# Patient Record
Sex: MALE | Race: OTHER | Employment: OTHER
[De-identification: names, ages, dates, MRNs, and addresses within clinical notes are randomized per-mention and may not be internally consistent; named-entity substitution may affect disease eponyms.]

---

## 2017-01-24 ENCOUNTER — OFFICE VISIT (OUTPATIENT)
Dept: FAMILY MEDICINE CLINIC | Facility: CLINIC | Age: 57
End: 2017-01-24

## 2017-01-24 VITALS
HEIGHT: 65 IN | DIASTOLIC BLOOD PRESSURE: 74 MMHG | BODY MASS INDEX: 23.19 KG/M2 | WEIGHT: 139.2 LBS | HEART RATE: 61 BPM | SYSTOLIC BLOOD PRESSURE: 139 MMHG | TEMPERATURE: 97.7 F

## 2017-01-24 DIAGNOSIS — L08.9 NONVENOMOUS INSECT BITE OF FACE WITH INFECTION, INITIAL ENCOUNTER: Primary | ICD-10-CM

## 2017-01-24 DIAGNOSIS — I10 ESSENTIAL HYPERTENSION: ICD-10-CM

## 2017-01-24 DIAGNOSIS — I25.10 CORONARY ARTERY DISEASE INVOLVING NATIVE CORONARY ARTERY OF NATIVE HEART, ANGINA PRESENCE UNSPECIFIED: ICD-10-CM
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
## 2017-01-24 DIAGNOSIS — S00.86XA NONVENOMOUS INSECT BITE OF FACE WITH INFECTION, INITIAL ENCOUNTER: Primary | ICD-10-CM

## 2017-01-24 DIAGNOSIS — L03.211 CELLULITIS, FACE: ICD-10-CM

## 2017-01-24 PROCEDURE — G8427 DOCREV CUR MEDS BY ELIG CLIN: HCPCS | Performed by: FAMILY MEDICINE

## 2017-01-24 PROCEDURE — 99213 OFFICE O/P EST LOW 20 MIN: CPT | Performed by: FAMILY MEDICINE

## 2017-01-24 PROCEDURE — 1036F TOBACCO NON-USER: CPT | Performed by: FAMILY MEDICINE

## 2017-01-24 PROCEDURE — G8420 CALC BMI NORM PARAMETERS: HCPCS | Performed by: FAMILY MEDICINE

## 2017-01-24 PROCEDURE — G8484 FLU IMMUNIZE NO ADMIN: HCPCS | Performed by: FAMILY MEDICINE

## 2017-01-24 PROCEDURE — G8598 ASA/ANTIPLAT THER USED: HCPCS | Performed by: FAMILY MEDICINE

## 2017-01-24 PROCEDURE — 3017F COLORECTAL CA SCREEN DOC REV: CPT | Performed by: FAMILY MEDICINE

## 2017-01-24 RX ORDER — CEPHALEXIN 500 MG/1
500 CAPSULE ORAL 3 TIMES DAILY
Qty: 30 CAPSULE | Refills: 1 | Status: SHIPPED | OUTPATIENT
Start: 2017-01-24 | End: 2017-02-03

## 2017-01-24 ASSESSMENT — PATIENT HEALTH QUESTIONNAIRE - PHQ9
SUM OF ALL RESPONSES TO PHQ QUESTIONS 1-9: 2
1. LITTLE INTEREST OR PLEASURE IN DOING THINGS: 1
2. FEELING DOWN, DEPRESSED OR HOPELESS: 1
SUM OF ALL RESPONSES TO PHQ9 QUESTIONS 1 & 2: 2

## 2017-01-24 ASSESSMENT — ENCOUNTER SYMPTOMS
SHORTNESS OF BREATH: 0
COUGH: 0
TROUBLE SWALLOWING: 0
CHEST TIGHTNESS: 0
CONSTIPATION: 0
RHINORRHEA: 0
ABDOMINAL PAIN: 0
SORE THROAT: 0
DIARRHEA: 0
BACK PAIN: 0
NAUSEA: 0

## 2017-01-30 DIAGNOSIS — F41.8 DEPRESSION WITH ANXIETY: ICD-10-CM

## 2017-01-30 DIAGNOSIS — F41.9 ANXIETY: ICD-10-CM

## 2017-01-31 RX ORDER — ALPRAZOLAM 0.25 MG/1
0.25 TABLET ORAL 2 TIMES DAILY PRN
Qty: 60 TABLET | Refills: 0 | Status: SHIPPED | OUTPATIENT
Start: 2017-01-31 | End: 2017-03-23 | Stop reason: SDUPTHER

## 2017-02-06 ENCOUNTER — TELEPHONE (OUTPATIENT)
Dept: FAMILY MEDICINE CLINIC | Facility: CLINIC | Age: 57
End: 2017-02-06

## 2017-02-09 RX ORDER — CLOPIDOGREL BISULFATE 75 MG/1
TABLET ORAL
Qty: 30 TABLET | Refills: 3 | Status: SHIPPED | OUTPATIENT
Start: 2017-02-09 | End: 2017-06-13 | Stop reason: SDUPTHER

## 2017-02-14 ENCOUNTER — TELEPHONE (OUTPATIENT)
Dept: FAMILY MEDICINE CLINIC | Facility: CLINIC | Age: 57
End: 2017-02-14

## 2017-02-23 ENCOUNTER — HOSPITAL ENCOUNTER (INPATIENT)
Age: 57
LOS: 1 days | Discharge: HOME | DRG: 313 | End: 2017-02-24
Attending: EMERGENCY MEDICINE | Admitting: INTERNAL MEDICINE
Payer: MEDICARE

## 2017-02-23 ENCOUNTER — APPOINTMENT (OUTPATIENT)
Dept: GENERAL RADIOLOGY | Age: 57
DRG: 313 | End: 2017-02-23
Payer: MEDICARE

## 2017-02-23 DIAGNOSIS — R07.9 CHEST PAIN, UNSPECIFIED TYPE: Primary | ICD-10-CM

## 2017-02-23 LAB
ABSOLUTE EOS #: 0.1 K/UL (ref 0–0.4)
ABSOLUTE LYMPH #: 5 K/UL (ref 1–4.8)
ABSOLUTE MONO #: 1 K/UL (ref 0.1–1.3)
ANION GAP SERPL CALCULATED.3IONS-SCNC: 15 MMOL/L (ref 9–17)
BASOPHILS # BLD: 0 % (ref 0–2)
BASOPHILS ABSOLUTE: 0 K/UL (ref 0–0.2)
BNP INTERPRETATION: NORMAL
BUN BLDV-MCNC: 19 MG/DL (ref 6–20)
BUN/CREAT BLD: ABNORMAL (ref 9–20)
CALCIUM SERPL-MCNC: 8.8 MG/DL (ref 8.6–10.4)
CHLORIDE BLD-SCNC: 101 MMOL/L (ref 98–107)
CO2: 24 MMOL/L (ref 20–31)
CREAT SERPL-MCNC: 1.01 MG/DL (ref 0.7–1.2)
DIFFERENTIAL TYPE: ABNORMAL
EOSINOPHILS RELATIVE PERCENT: 1 % (ref 0–4)
GFR AFRICAN AMERICAN: >60 ML/MIN
GFR NON-AFRICAN AMERICAN: >60 ML/MIN
GFR SERPL CREATININE-BSD FRML MDRD: ABNORMAL ML/MIN/{1.73_M2}
GFR SERPL CREATININE-BSD FRML MDRD: ABNORMAL ML/MIN/{1.73_M2}
GLUCOSE BLD-MCNC: 136 MG/DL (ref 70–99)
HCT VFR BLD CALC: 39.7 % (ref 41–53)
HEMOGLOBIN: 13.6 G/DL (ref 13.5–17.5)
INR BLD: 1
LYMPHOCYTES # BLD: 51 % (ref 24–44)
MCH RBC QN AUTO: 30.8 PG (ref 26–34)
MCHC RBC AUTO-ENTMCNC: 34.1 G/DL (ref 31–37)
MCV RBC AUTO: 90.1 FL (ref 80–100)
MONOCYTES # BLD: 11 % (ref 1–7)
PARTIAL THROMBOPLASTIN TIME: 27.3 SEC (ref 23–31)
PDW BLD-RTO: 12.8 % (ref 11.5–14.9)
PLATELET # BLD: 200 K/UL (ref 150–450)
PLATELET ESTIMATE: ABNORMAL
PMV BLD AUTO: 8.8 FL (ref 6–12)
POTASSIUM SERPL-SCNC: 3.9 MMOL/L (ref 3.7–5.3)
PRO-BNP: 38 PG/ML
PROTHROMBIN TIME: 10.6 SEC (ref 9.7–12)
RBC # BLD: 4.41 M/UL (ref 4.5–5.9)
RBC # BLD: ABNORMAL 10*6/UL
SEG NEUTROPHILS: 37 % (ref 36–66)
SEGMENTED NEUTROPHILS ABSOLUTE COUNT: 3.6 K/UL (ref 1.3–9.1)
SODIUM BLD-SCNC: 140 MMOL/L (ref 135–144)
TROPONIN INTERP: NORMAL
TROPONIN INTERP: NORMAL
TROPONIN T: <0.03 NG/ML
TROPONIN T: <0.03 NG/ML
WBC # BLD: 9.7 K/UL (ref 3.5–11)
WBC # BLD: ABNORMAL 10*3/UL

## 2017-02-23 PROCEDURE — 83880 ASSAY OF NATRIURETIC PEPTIDE: CPT

## 2017-02-23 PROCEDURE — 96374 THER/PROPH/DIAG INJ IV PUSH: CPT

## 2017-02-23 PROCEDURE — 85025 COMPLETE CBC W/AUTO DIFF WBC: CPT

## 2017-02-23 PROCEDURE — 99285 EMERGENCY DEPT VISIT HI MDM: CPT

## 2017-02-23 PROCEDURE — 71010 XR CHEST PORTABLE: CPT

## 2017-02-23 PROCEDURE — 96376 TX/PRO/DX INJ SAME DRUG ADON: CPT

## 2017-02-23 PROCEDURE — 96375 TX/PRO/DX INJ NEW DRUG ADDON: CPT

## 2017-02-23 PROCEDURE — 80048 BASIC METABOLIC PNL TOTAL CA: CPT

## 2017-02-23 PROCEDURE — 84443 ASSAY THYROID STIM HORMONE: CPT

## 2017-02-23 PROCEDURE — 6360000002 HC RX W HCPCS: Performed by: EMERGENCY MEDICINE

## 2017-02-23 PROCEDURE — 84484 ASSAY OF TROPONIN QUANT: CPT

## 2017-02-23 PROCEDURE — 36415 COLL VENOUS BLD VENIPUNCTURE: CPT

## 2017-02-23 PROCEDURE — 71010 XR CHEST PORTABLE: CPT | Performed by: RADIOLOGY

## 2017-02-23 PROCEDURE — 85730 THROMBOPLASTIN TIME PARTIAL: CPT

## 2017-02-23 PROCEDURE — 93005 ELECTROCARDIOGRAM TRACING: CPT

## 2017-02-23 PROCEDURE — 85610 PROTHROMBIN TIME: CPT

## 2017-02-23 RX ORDER — ONDANSETRON 2 MG/ML
4 INJECTION INTRAMUSCULAR; INTRAVENOUS ONCE
Status: COMPLETED | OUTPATIENT
Start: 2017-02-23 | End: 2017-02-23

## 2017-02-23 RX ORDER — MORPHINE SULFATE 4 MG/ML
4 INJECTION, SOLUTION INTRAMUSCULAR; INTRAVENOUS ONCE
Status: COMPLETED | OUTPATIENT
Start: 2017-02-23 | End: 2017-02-23

## 2017-02-23 RX ADMIN — MORPHINE SULFATE 4 MG: 4 INJECTION, SOLUTION INTRAMUSCULAR; INTRAVENOUS at 20:20

## 2017-02-23 RX ADMIN — ONDANSETRON 4 MG: 2 INJECTION INTRAMUSCULAR; INTRAVENOUS at 20:20

## 2017-02-23 ASSESSMENT — PAIN SCALES - GENERAL
PAINLEVEL_OUTOF10: 8
PAINLEVEL_OUTOF10: 8

## 2017-02-23 ASSESSMENT — PAIN DESCRIPTION - ORIENTATION: ORIENTATION: LEFT

## 2017-02-23 ASSESSMENT — PAIN DESCRIPTION - DESCRIPTORS: DESCRIPTORS: STABBING

## 2017-02-23 ASSESSMENT — PAIN DESCRIPTION - ONSET: ONSET: SUDDEN

## 2017-02-23 ASSESSMENT — PAIN DESCRIPTION - PAIN TYPE: TYPE: ACUTE PAIN

## 2017-02-23 ASSESSMENT — PAIN DESCRIPTION - FREQUENCY: FREQUENCY: CONTINUOUS

## 2017-02-23 ASSESSMENT — PAIN DESCRIPTION - PROGRESSION: CLINICAL_PROGRESSION: NOT CHANGED

## 2017-02-23 ASSESSMENT — PAIN DESCRIPTION - LOCATION: LOCATION: CHEST

## 2017-02-24 ENCOUNTER — HOSPITAL ENCOUNTER (OUTPATIENT)
Dept: CARDIAC CATH/INVASIVE PROCEDURES | Age: 57
Discharge: HOME | End: 2017-02-24
Payer: MEDICARE

## 2017-02-24 VITALS
OXYGEN SATURATION: 100 % | HEIGHT: 65 IN | HEART RATE: 56 BPM | BODY MASS INDEX: 24.13 KG/M2 | SYSTOLIC BLOOD PRESSURE: 126 MMHG | TEMPERATURE: 97.9 F | DIASTOLIC BLOOD PRESSURE: 60 MMHG | RESPIRATION RATE: 17 BRPM | WEIGHT: 144.84 LBS

## 2017-02-24 VITALS
SYSTOLIC BLOOD PRESSURE: 135 MMHG | OXYGEN SATURATION: 100 % | HEART RATE: 56 BPM | DIASTOLIC BLOOD PRESSURE: 67 MMHG | RESPIRATION RATE: 12 BRPM

## 2017-02-24 LAB
ABSOLUTE EOS #: 0.1 K/UL (ref 0–0.4)
ABSOLUTE LYMPH #: 3.2 K/UL (ref 1–4.8)
ABSOLUTE MONO #: 0.9 K/UL (ref 0.1–1.3)
ALBUMIN SERPL-MCNC: 3.9 G/DL (ref 3.5–5.2)
ALBUMIN/GLOBULIN RATIO: ABNORMAL (ref 1–2.5)
ALP BLD-CCNC: 41 U/L (ref 40–129)
ALT SERPL-CCNC: 15 U/L (ref 5–41)
ANION GAP SERPL CALCULATED.3IONS-SCNC: 8 MMOL/L (ref 9–17)
AST SERPL-CCNC: 15 U/L
BASOPHILS # BLD: 1 % (ref 0–2)
BASOPHILS ABSOLUTE: 0 K/UL (ref 0–0.2)
BILIRUB SERPL-MCNC: 0.95 MG/DL (ref 0.3–1.2)
BNP INTERPRETATION: NORMAL
BUN BLDV-MCNC: 13 MG/DL (ref 6–20)
BUN/CREAT BLD: ABNORMAL (ref 9–20)
CALCIUM SERPL-MCNC: 8.6 MG/DL (ref 8.6–10.4)
CHLORIDE BLD-SCNC: 105 MMOL/L (ref 98–107)
CHOLESTEROL/HDL RATIO: 2.8
CHOLESTEROL: 105 MG/DL
CO2: 27 MMOL/L (ref 20–31)
CREAT SERPL-MCNC: 0.76 MG/DL (ref 0.7–1.2)
DIFFERENTIAL TYPE: ABNORMAL
EKG ATRIAL RATE: 58 BPM
EKG P AXIS: 49 DEGREES
EKG P-R INTERVAL: 134 MS
EKG Q-T INTERVAL: 424 MS
EKG QRS DURATION: 102 MS
EKG QTC CALCULATION (BAZETT): 416 MS
EKG R AXIS: 35 DEGREES
EKG T AXIS: 62 DEGREES
EKG VENTRICULAR RATE: 58 BPM
EOSINOPHILS RELATIVE PERCENT: 1 % (ref 0–4)
GFR AFRICAN AMERICAN: >60 ML/MIN
GFR NON-AFRICAN AMERICAN: >60 ML/MIN
GFR SERPL CREATININE-BSD FRML MDRD: ABNORMAL ML/MIN/{1.73_M2}
GFR SERPL CREATININE-BSD FRML MDRD: ABNORMAL ML/MIN/{1.73_M2}
GLUCOSE BLD-MCNC: 103 MG/DL (ref 75–110)
GLUCOSE BLD-MCNC: 121 MG/DL (ref 70–99)
HCT VFR BLD CALC: 36.8 % (ref 41–53)
HCT VFR BLD CALC: 38.3 % (ref 41–53)
HDLC SERPL-MCNC: 37 MG/DL
HEMOGLOBIN: 12.7 G/DL (ref 13.5–17.5)
HEMOGLOBIN: 13.1 G/DL (ref 13.5–17.5)
LDL CHOLESTEROL: 54 MG/DL (ref 0–130)
LV EF: 55 %
LVEF MODALITY: NORMAL
LYMPHOCYTES # BLD: 39 % (ref 24–44)
MAGNESIUM: 2.3 MG/DL (ref 1.6–2.6)
MCH RBC QN AUTO: 30.6 PG (ref 26–34)
MCH RBC QN AUTO: 30.6 PG (ref 26–34)
MCHC RBC AUTO-ENTMCNC: 34.1 G/DL (ref 31–37)
MCHC RBC AUTO-ENTMCNC: 34.4 G/DL (ref 31–37)
MCV RBC AUTO: 89 FL (ref 80–100)
MCV RBC AUTO: 89.5 FL (ref 80–100)
MONOCYTES # BLD: 10 % (ref 1–7)
PARTIAL THROMBOPLASTIN TIME: 27.5 SEC (ref 23–31)
PDW BLD-RTO: 12.5 % (ref 11.5–14.9)
PDW BLD-RTO: 12.9 % (ref 11.5–14.9)
PLATELET # BLD: 167 K/UL (ref 150–450)
PLATELET # BLD: 178 K/UL (ref 150–450)
PLATELET ESTIMATE: ABNORMAL
PMV BLD AUTO: 8.4 FL (ref 6–12)
PMV BLD AUTO: 8.6 FL (ref 6–12)
POTASSIUM SERPL-SCNC: 3.8 MMOL/L (ref 3.7–5.3)
PRO-BNP: 95 PG/ML
RBC # BLD: 4.13 M/UL (ref 4.5–5.9)
RBC # BLD: 4.28 M/UL (ref 4.5–5.9)
RBC # BLD: ABNORMAL 10*6/UL
SEG NEUTROPHILS: 49 % (ref 36–66)
SEGMENTED NEUTROPHILS ABSOLUTE COUNT: 4.1 K/UL (ref 1.3–9.1)
SODIUM BLD-SCNC: 140 MMOL/L (ref 135–144)
TOTAL PROTEIN: 6 G/DL (ref 6.4–8.3)
TRIGL SERPL-MCNC: 70 MG/DL
TSH SERPL DL<=0.05 MIU/L-ACNC: 0.63 MIU/L (ref 0.3–5)
VLDLC SERPL CALC-MCNC: ABNORMAL MG/DL (ref 1–30)
WBC # BLD: 10.6 K/UL (ref 3.5–11)
WBC # BLD: 8.3 K/UL (ref 3.5–11)
WBC # BLD: ABNORMAL 10*3/UL

## 2017-02-24 PROCEDURE — C1769 GUIDE WIRE: HCPCS

## 2017-02-24 PROCEDURE — 93459 L HRT ART/GRFT ANGIO: CPT | Performed by: INTERNAL MEDICINE

## 2017-02-24 PROCEDURE — 82947 ASSAY GLUCOSE BLOOD QUANT: CPT

## 2017-02-24 PROCEDURE — 7100000010 HC PHASE II RECOVERY - FIRST 15 MIN

## 2017-02-24 PROCEDURE — 85027 COMPLETE CBC AUTOMATED: CPT

## 2017-02-24 PROCEDURE — 85025 COMPLETE CBC W/AUTO DIFF WBC: CPT

## 2017-02-24 PROCEDURE — C8929 TTE W OR WO FOL WCON,DOPPLER: HCPCS

## 2017-02-24 PROCEDURE — 80053 COMPREHEN METABOLIC PANEL: CPT

## 2017-02-24 PROCEDURE — 6370000000 HC RX 637 (ALT 250 FOR IP): Performed by: NURSE PRACTITIONER

## 2017-02-24 PROCEDURE — 80061 LIPID PANEL: CPT

## 2017-02-24 PROCEDURE — 6360000002 HC RX W HCPCS

## 2017-02-24 PROCEDURE — 2500000003 HC RX 250 WO HCPCS: Performed by: INTERNAL MEDICINE

## 2017-02-24 PROCEDURE — 2060000000 HC ICU INTERMEDIATE R&B

## 2017-02-24 PROCEDURE — 85730 THROMBOPLASTIN TIME PARTIAL: CPT

## 2017-02-24 PROCEDURE — 36415 COLL VENOUS BLD VENIPUNCTURE: CPT

## 2017-02-24 PROCEDURE — 2580000003 HC RX 258: Performed by: NURSE PRACTITIONER

## 2017-02-24 PROCEDURE — 6360000002 HC RX W HCPCS: Performed by: EMERGENCY MEDICINE

## 2017-02-24 PROCEDURE — 83880 ASSAY OF NATRIURETIC PEPTIDE: CPT

## 2017-02-24 PROCEDURE — 83735 ASSAY OF MAGNESIUM: CPT

## 2017-02-24 PROCEDURE — 6370000000 HC RX 637 (ALT 250 FOR IP)

## 2017-02-24 PROCEDURE — 6360000002 HC RX W HCPCS: Performed by: NURSE PRACTITIONER

## 2017-02-24 PROCEDURE — 7100000011 HC PHASE II RECOVERY - ADDTL 15 MIN

## 2017-02-24 PROCEDURE — 6360000002 HC RX W HCPCS: Performed by: INTERNAL MEDICINE

## 2017-02-24 PROCEDURE — 99223 1ST HOSP IP/OBS HIGH 75: CPT | Performed by: INTERNAL MEDICINE

## 2017-02-24 PROCEDURE — 99238 HOSP IP/OBS DSCHRG MGMT 30/<: CPT | Performed by: INTERNAL MEDICINE

## 2017-02-24 PROCEDURE — C1768 GRAFT, VASCULAR: HCPCS

## 2017-02-24 PROCEDURE — 93005 ELECTROCARDIOGRAM TRACING: CPT

## 2017-02-24 PROCEDURE — C1725 CATH, TRANSLUMIN NON-LASER: HCPCS

## 2017-02-24 RX ORDER — POTASSIUM CHLORIDE 20MEQ/15ML
40 LIQUID (ML) ORAL PRN
Status: DISCONTINUED | OUTPATIENT
Start: 2017-02-24 | End: 2017-02-24 | Stop reason: HOSPADM

## 2017-02-24 RX ORDER — ALPRAZOLAM 0.25 MG/1
0.25 TABLET ORAL 2 TIMES DAILY PRN
Status: DISCONTINUED | OUTPATIENT
Start: 2017-02-24 | End: 2017-02-24 | Stop reason: HOSPADM

## 2017-02-24 RX ORDER — ONDANSETRON 2 MG/ML
4 INJECTION INTRAMUSCULAR; INTRAVENOUS EVERY 6 HOURS PRN
Status: DISCONTINUED | OUTPATIENT
Start: 2017-02-24 | End: 2017-02-24 | Stop reason: HOSPADM

## 2017-02-24 RX ORDER — SODIUM CHLORIDE 0.9 % (FLUSH) 0.9 %
10 SYRINGE (ML) INJECTION PRN
Status: DISCONTINUED | OUTPATIENT
Start: 2017-02-24 | End: 2017-02-25 | Stop reason: HOSPADM

## 2017-02-24 RX ORDER — FAMOTIDINE 20 MG/1
20 TABLET, FILM COATED ORAL 2 TIMES DAILY
Status: ON HOLD | COMMUNITY
End: 2017-07-22

## 2017-02-24 RX ORDER — ACETAMINOPHEN 325 MG/1
650 TABLET ORAL EVERY 4 HOURS PRN
Status: DISCONTINUED | OUTPATIENT
Start: 2017-02-24 | End: 2017-02-25 | Stop reason: HOSPADM

## 2017-02-24 RX ORDER — SODIUM CHLORIDE 0.9 % (FLUSH) 0.9 %
10 SYRINGE (ML) INJECTION EVERY 12 HOURS SCHEDULED
Status: DISCONTINUED | OUTPATIENT
Start: 2017-02-24 | End: 2017-02-25 | Stop reason: HOSPADM

## 2017-02-24 RX ORDER — NITROGLYCERIN 20 MG/100ML
5 INJECTION INTRAVENOUS CONTINUOUS
Status: DISCONTINUED | OUTPATIENT
Start: 2017-02-24 | End: 2017-02-24 | Stop reason: HOSPADM

## 2017-02-24 RX ORDER — FAMOTIDINE 20 MG/1
20 TABLET, FILM COATED ORAL 2 TIMES DAILY
Status: DISCONTINUED | OUTPATIENT
Start: 2017-02-24 | End: 2017-02-24 | Stop reason: HOSPADM

## 2017-02-24 RX ORDER — ASPIRIN 325 MG
325 TABLET ORAL DAILY
Status: DISCONTINUED | OUTPATIENT
Start: 2017-02-24 | End: 2017-02-24 | Stop reason: HOSPADM

## 2017-02-24 RX ORDER — ACETAMINOPHEN 325 MG/1
650 TABLET ORAL EVERY 4 HOURS PRN
Status: DISCONTINUED | OUTPATIENT
Start: 2017-02-24 | End: 2017-02-24 | Stop reason: HOSPADM

## 2017-02-24 RX ORDER — SODIUM CHLORIDE 0.9 % (FLUSH) 0.9 %
10 SYRINGE (ML) INJECTION EVERY 12 HOURS SCHEDULED
Status: DISCONTINUED | OUTPATIENT
Start: 2017-02-24 | End: 2017-02-24 | Stop reason: HOSPADM

## 2017-02-24 RX ORDER — POTASSIUM CHLORIDE 20 MEQ/1
40 TABLET, EXTENDED RELEASE ORAL PRN
Status: DISCONTINUED | OUTPATIENT
Start: 2017-02-24 | End: 2017-02-24 | Stop reason: HOSPADM

## 2017-02-24 RX ORDER — SODIUM CHLORIDE 0.9 % (FLUSH) 0.9 %
10 SYRINGE (ML) INJECTION PRN
Status: DISCONTINUED | OUTPATIENT
Start: 2017-02-24 | End: 2017-02-24 | Stop reason: HOSPADM

## 2017-02-24 RX ORDER — LISINOPRIL 10 MG/1
10 TABLET ORAL DAILY
Status: DISCONTINUED | OUTPATIENT
Start: 2017-02-24 | End: 2017-02-24 | Stop reason: HOSPADM

## 2017-02-24 RX ORDER — OXYCODONE HYDROCHLORIDE AND ACETAMINOPHEN 5; 325 MG/1; MG/1
1 TABLET ORAL ONCE
Status: COMPLETED | OUTPATIENT
Start: 2017-02-24 | End: 2017-02-24

## 2017-02-24 RX ORDER — CLOPIDOGREL BISULFATE 75 MG/1
75 TABLET ORAL DAILY
Status: DISCONTINUED | OUTPATIENT
Start: 2017-02-24 | End: 2017-02-24 | Stop reason: HOSPADM

## 2017-02-24 RX ORDER — OXYCODONE HYDROCHLORIDE 5 MG/1
5 TABLET ORAL EVERY 8 HOURS PRN
Status: DISCONTINUED | OUTPATIENT
Start: 2017-02-24 | End: 2017-02-24 | Stop reason: HOSPADM

## 2017-02-24 RX ORDER — DEXTROSE, SODIUM CHLORIDE, AND POTASSIUM CHLORIDE 5; .45; .15 G/100ML; G/100ML; G/100ML
INJECTION INTRAVENOUS CONTINUOUS
Status: DISCONTINUED | OUTPATIENT
Start: 2017-02-24 | End: 2017-02-24 | Stop reason: HOSPADM

## 2017-02-24 RX ORDER — OXYCODONE HYDROCHLORIDE AND ACETAMINOPHEN 5; 325 MG/1; MG/1
1 TABLET ORAL EVERY 8 HOURS PRN
Status: DISCONTINUED | OUTPATIENT
Start: 2017-02-24 | End: 2017-02-24 | Stop reason: HOSPADM

## 2017-02-24 RX ORDER — MORPHINE SULFATE 2 MG/ML
2 INJECTION, SOLUTION INTRAMUSCULAR; INTRAVENOUS
Status: DISCONTINUED | OUTPATIENT
Start: 2017-02-24 | End: 2017-02-24 | Stop reason: HOSPADM

## 2017-02-24 RX ORDER — ATORVASTATIN CALCIUM 40 MG/1
40 TABLET, FILM COATED ORAL DAILY
Status: DISCONTINUED | OUTPATIENT
Start: 2017-02-24 | End: 2017-02-24 | Stop reason: HOSPADM

## 2017-02-24 RX ORDER — POTASSIUM CHLORIDE 7.45 MG/ML
10 INJECTION INTRAVENOUS PRN
Status: DISCONTINUED | OUTPATIENT
Start: 2017-02-24 | End: 2017-02-24 | Stop reason: HOSPADM

## 2017-02-24 RX ORDER — OXYCODONE AND ACETAMINOPHEN 10; 325 MG/1; MG/1
1 TABLET ORAL EVERY 8 HOURS PRN
Status: DISCONTINUED | OUTPATIENT
Start: 2017-02-24 | End: 2017-02-24 | Stop reason: CLARIF

## 2017-02-24 RX ORDER — NITROGLYCERIN 0.4 MG/1
0.4 TABLET SUBLINGUAL EVERY 5 MIN PRN
Status: DISCONTINUED | OUTPATIENT
Start: 2017-02-24 | End: 2017-02-24 | Stop reason: HOSPADM

## 2017-02-24 RX ORDER — MORPHINE SULFATE 4 MG/ML
4 INJECTION, SOLUTION INTRAMUSCULAR; INTRAVENOUS ONCE
Status: COMPLETED | OUTPATIENT
Start: 2017-02-24 | End: 2017-02-24

## 2017-02-24 RX ORDER — HEPARIN SODIUM 10000 [USP'U]/100ML
1000 INJECTION, SOLUTION INTRAVENOUS CONTINUOUS
Status: DISCONTINUED | OUTPATIENT
Start: 2017-02-24 | End: 2017-02-24 | Stop reason: HOSPADM

## 2017-02-24 RX ORDER — OXYCODONE HYDROCHLORIDE AND ACETAMINOPHEN 5; 325 MG/1; MG/1
1 TABLET ORAL ONCE
Status: DISCONTINUED | OUTPATIENT
Start: 2017-02-24 | End: 2017-02-24

## 2017-02-24 RX ORDER — MORPHINE SULFATE 4 MG/ML
4 INJECTION, SOLUTION INTRAMUSCULAR; INTRAVENOUS
Status: DISCONTINUED | OUTPATIENT
Start: 2017-02-24 | End: 2017-02-24 | Stop reason: HOSPADM

## 2017-02-24 RX ORDER — ASPIRIN 325 MG
325 TABLET ORAL DAILY
Status: ON HOLD | COMMUNITY
End: 2017-02-24 | Stop reason: HOSPADM

## 2017-02-24 RX ADMIN — MORPHINE SULFATE 2 MG: 2 INJECTION, SOLUTION INTRAMUSCULAR; INTRAVENOUS at 11:25

## 2017-02-24 RX ADMIN — NITROGLYCERIN 0.4 MG: 0.4 TABLET SUBLINGUAL at 11:01

## 2017-02-24 RX ADMIN — LISINOPRIL 10 MG: 10 TABLET ORAL at 11:01

## 2017-02-24 RX ADMIN — HEPARIN SODIUM AND DEXTROSE 1000 UNITS/HR: 10000; 5 INJECTION INTRAVENOUS at 11:50

## 2017-02-24 RX ADMIN — OXYCODONE HYDROCHLORIDE 5 MG: 5 TABLET ORAL at 07:36

## 2017-02-24 RX ADMIN — NITROGLYCERIN 5 MCG/MIN: 20 INJECTION INTRAVENOUS at 11:51

## 2017-02-24 RX ADMIN — ATORVASTATIN CALCIUM 40 MG: 40 TABLET, FILM COATED ORAL at 11:01

## 2017-02-24 RX ADMIN — MORPHINE SULFATE 4 MG: 4 INJECTION, SOLUTION INTRAMUSCULAR; INTRAVENOUS at 00:08

## 2017-02-24 RX ADMIN — Medication 10 ML: at 02:35

## 2017-02-24 RX ADMIN — ALPRAZOLAM 0.25 MG: 0.25 TABLET ORAL at 10:49

## 2017-02-24 RX ADMIN — ASPIRIN 325 MG ORAL TABLET 325 MG: 325 PILL ORAL at 11:01

## 2017-02-24 RX ADMIN — MORPHINE SULFATE 4 MG: 4 INJECTION, SOLUTION INTRAMUSCULAR; INTRAVENOUS at 05:00

## 2017-02-24 RX ADMIN — OXYCODONE HYDROCHLORIDE AND ACETAMINOPHEN 1 TABLET: 5; 325 TABLET ORAL at 16:00

## 2017-02-24 RX ADMIN — CLOPIDOGREL BISULFATE 75 MG: 75 TABLET ORAL at 11:01

## 2017-02-24 RX ADMIN — OXYCODONE HYDROCHLORIDE AND ACETAMINOPHEN 1 TABLET: 5; 325 TABLET ORAL at 07:36

## 2017-02-24 RX ADMIN — FAMOTIDINE 20 MG: 20 TABLET ORAL at 02:54

## 2017-02-24 RX ADMIN — Medication 10 ML: at 08:45

## 2017-02-24 RX ADMIN — POTASSIUM CHLORIDE, DEXTROSE MONOHYDRATE AND SODIUM CHLORIDE: 150; 5; 450 INJECTION, SOLUTION INTRAVENOUS at 02:54

## 2017-02-24 RX ADMIN — ONDANSETRON 4 MG: 2 INJECTION INTRAMUSCULAR; INTRAVENOUS at 02:34

## 2017-02-24 RX ADMIN — ONDANSETRON 4 MG: 2 INJECTION INTRAMUSCULAR; INTRAVENOUS at 08:45

## 2017-02-24 RX ADMIN — MORPHINE SULFATE 4 MG: 4 INJECTION, SOLUTION INTRAMUSCULAR; INTRAVENOUS at 02:54

## 2017-02-24 ASSESSMENT — ENCOUNTER SYMPTOMS
BLURRED VISION: 0
NAUSEA: 1
SORE THROAT: 0
WHEEZING: 0
ABDOMINAL PAIN: 0
SPUTUM PRODUCTION: 0
DIARRHEA: 0
SHORTNESS OF BREATH: 0
BLOOD IN STOOL: 0
COLOR CHANGE: 0
COUGH: 0
ORTHOPNEA: 0
PHOTOPHOBIA: 0
NAUSEA: 0
DOUBLE VISION: 0
BACK PAIN: 0
VOMITING: 1
ABDOMINAL DISTENTION: 0
CHEST TIGHTNESS: 0
CONSTIPATION: 0
STRIDOR: 0

## 2017-02-24 ASSESSMENT — PAIN DESCRIPTION - PAIN TYPE
TYPE: ACUTE PAIN

## 2017-02-24 ASSESSMENT — PAIN SCALES - GENERAL
PAINLEVEL_OUTOF10: 9
PAINLEVEL_OUTOF10: 7
PAINLEVEL_OUTOF10: 6
PAINLEVEL_OUTOF10: 8
PAINLEVEL_OUTOF10: 6
PAINLEVEL_OUTOF10: 8
PAINLEVEL_OUTOF10: 6

## 2017-02-24 ASSESSMENT — PAIN DESCRIPTION - DESCRIPTORS
DESCRIPTORS: SHARP;STABBING
DESCRIPTORS: SHARP;STABBING

## 2017-02-24 ASSESSMENT — PAIN DESCRIPTION - FREQUENCY
FREQUENCY: CONTINUOUS
FREQUENCY: CONTINUOUS

## 2017-02-24 ASSESSMENT — PAIN DESCRIPTION - LOCATION
LOCATION: CHEST

## 2017-02-24 ASSESSMENT — HEART SCORE: ECG: 1

## 2017-02-24 ASSESSMENT — PAIN DESCRIPTION - DIRECTION
RADIATING_TOWARDS: LEFT SHOULDER
RADIATING_TOWARDS: LEFT SHOULDER

## 2017-02-24 ASSESSMENT — PAIN DESCRIPTION - ORIENTATION: ORIENTATION: LEFT

## 2017-02-27 LAB
EKG ATRIAL RATE: 60 BPM
EKG P AXIS: 62 DEGREES
EKG P-R INTERVAL: 124 MS
EKG Q-T INTERVAL: 428 MS
EKG QRS DURATION: 92 MS
EKG QTC CALCULATION (BAZETT): 428 MS
EKG R AXIS: 1 DEGREES
EKG T AXIS: 69 DEGREES
EKG VENTRICULAR RATE: 60 BPM

## 2017-03-23 DIAGNOSIS — F41.8 DEPRESSION WITH ANXIETY: ICD-10-CM

## 2017-03-23 DIAGNOSIS — F41.9 ANXIETY: ICD-10-CM

## 2017-03-23 RX ORDER — ALPRAZOLAM 0.25 MG/1
0.25 TABLET ORAL 2 TIMES DAILY PRN
Qty: 60 TABLET | Refills: 0 | Status: SHIPPED | OUTPATIENT
Start: 2017-03-23 | End: 2017-04-20 | Stop reason: SDUPTHER

## 2017-04-04 ENCOUNTER — HOSPITAL ENCOUNTER (EMERGENCY)
Age: 57
Discharge: HOME | End: 2017-04-04
Attending: EMERGENCY MEDICINE
Payer: MEDICARE

## 2017-04-04 ENCOUNTER — APPOINTMENT (OUTPATIENT)
Dept: GENERAL RADIOLOGY | Age: 57
End: 2017-04-04
Payer: MEDICARE

## 2017-04-04 VITALS
HEART RATE: 88 BPM | BODY MASS INDEX: 22.49 KG/M2 | OXYGEN SATURATION: 96 % | TEMPERATURE: 98.3 F | SYSTOLIC BLOOD PRESSURE: 148 MMHG | RESPIRATION RATE: 16 BRPM | DIASTOLIC BLOOD PRESSURE: 82 MMHG | WEIGHT: 135 LBS | HEIGHT: 65 IN

## 2017-04-04 DIAGNOSIS — S61.219A LACERATION OF FINGER, INITIAL ENCOUNTER: Primary | ICD-10-CM

## 2017-04-04 PROCEDURE — 90471 IMMUNIZATION ADMIN: CPT | Performed by: PHYSICIAN ASSISTANT

## 2017-04-04 PROCEDURE — 96372 THER/PROPH/DIAG INJ SC/IM: CPT

## 2017-04-04 PROCEDURE — 99283 EMERGENCY DEPT VISIT LOW MDM: CPT

## 2017-04-04 PROCEDURE — 6370000000 HC RX 637 (ALT 250 FOR IP): Performed by: PHYSICIAN ASSISTANT

## 2017-04-04 PROCEDURE — 12001 RPR S/N/AX/GEN/TRNK 2.5CM/<: CPT

## 2017-04-04 PROCEDURE — 2500000003 HC RX 250 WO HCPCS: Performed by: PHYSICIAN ASSISTANT

## 2017-04-04 PROCEDURE — 6360000002 HC RX W HCPCS: Performed by: PHYSICIAN ASSISTANT

## 2017-04-04 PROCEDURE — 73130 X-RAY EXAM OF HAND: CPT

## 2017-04-04 PROCEDURE — 90715 TDAP VACCINE 7 YRS/> IM: CPT | Performed by: PHYSICIAN ASSISTANT

## 2017-04-04 RX ORDER — OXYCODONE HYDROCHLORIDE AND ACETAMINOPHEN 5; 325 MG/1; MG/1
1 TABLET ORAL EVERY 4 HOURS PRN
Qty: 10 TABLET | Refills: 0 | Status: SHIPPED | OUTPATIENT
Start: 2017-04-04 | End: 2017-04-11

## 2017-04-04 RX ORDER — CEPHALEXIN 250 MG/1
500 CAPSULE ORAL ONCE
Status: COMPLETED | OUTPATIENT
Start: 2017-04-04 | End: 2017-04-04

## 2017-04-04 RX ORDER — MORPHINE SULFATE 4 MG/ML
4 INJECTION, SOLUTION INTRAMUSCULAR; INTRAVENOUS ONCE
Status: COMPLETED | OUTPATIENT
Start: 2017-04-04 | End: 2017-04-04

## 2017-04-04 RX ORDER — CEPHALEXIN 500 MG/1
500 CAPSULE ORAL 4 TIMES DAILY
Qty: 40 CAPSULE | Refills: 0 | Status: SHIPPED | OUTPATIENT
Start: 2017-04-04 | End: 2017-04-14

## 2017-04-04 RX ORDER — LIDOCAINE HYDROCHLORIDE 10 MG/ML
20 INJECTION, SOLUTION INFILTRATION; PERINEURAL ONCE
Status: COMPLETED | OUTPATIENT
Start: 2017-04-04 | End: 2017-04-04

## 2017-04-04 RX ADMIN — TETANUS TOXOID, REDUCED DIPHTHERIA TOXOID AND ACELLULAR PERTUSSIS VACCINE, ADSORBED 0.5 ML: 5; 2.5; 8; 8; 2.5 SUSPENSION INTRAMUSCULAR at 19:14

## 2017-04-04 RX ADMIN — CEPHALEXIN 500 MG: 250 CAPSULE ORAL at 21:28

## 2017-04-04 RX ADMIN — HYDROMORPHONE HYDROCHLORIDE 1 MG: 1 INJECTION, SOLUTION INTRAMUSCULAR; INTRAVENOUS; SUBCUTANEOUS at 20:42

## 2017-04-04 RX ADMIN — MORPHINE SULFATE 4 MG: 4 INJECTION, SOLUTION INTRAMUSCULAR; INTRAVENOUS at 19:14

## 2017-04-04 RX ADMIN — LIDOCAINE HYDROCHLORIDE 20 ML: 10 INJECTION, SOLUTION INFILTRATION; PERINEURAL at 21:27

## 2017-04-04 ASSESSMENT — PAIN DESCRIPTION - LOCATION
LOCATION: FINGER (COMMENT WHICH ONE)
LOCATION: HAND

## 2017-04-04 ASSESSMENT — PAIN SCALES - GENERAL
PAINLEVEL_OUTOF10: 7
PAINLEVEL_OUTOF10: 7
PAINLEVEL_OUTOF10: 8
PAINLEVEL_OUTOF10: 9
PAINLEVEL_OUTOF10: 10

## 2017-04-04 ASSESSMENT — PAIN DESCRIPTION - ORIENTATION
ORIENTATION: LEFT
ORIENTATION: LEFT

## 2017-04-20 DIAGNOSIS — F41.9 ANXIETY: ICD-10-CM

## 2017-04-20 DIAGNOSIS — F41.8 DEPRESSION WITH ANXIETY: ICD-10-CM

## 2017-04-23 RX ORDER — ALPRAZOLAM 0.25 MG/1
0.25 TABLET ORAL 2 TIMES DAILY PRN
Qty: 60 TABLET | Refills: 0 | Status: SHIPPED | OUTPATIENT
Start: 2017-04-23 | End: 2017-06-13 | Stop reason: SDUPTHER

## 2017-05-26 DIAGNOSIS — F41.9 ANXIETY: ICD-10-CM

## 2017-05-26 DIAGNOSIS — F41.8 DEPRESSION WITH ANXIETY: ICD-10-CM

## 2017-05-26 RX ORDER — ALPRAZOLAM 0.25 MG/1
0.25 TABLET ORAL 2 TIMES DAILY PRN
Qty: 60 TABLET | Refills: 0 | OUTPATIENT
Start: 2017-05-26

## 2017-06-13 ENCOUNTER — OFFICE VISIT (OUTPATIENT)
Dept: FAMILY MEDICINE CLINIC | Age: 57
End: 2017-06-13
Payer: MEDICARE

## 2017-06-13 VITALS
SYSTOLIC BLOOD PRESSURE: 150 MMHG | WEIGHT: 132.6 LBS | BODY MASS INDEX: 22.09 KG/M2 | DIASTOLIC BLOOD PRESSURE: 79 MMHG | HEIGHT: 65 IN | HEART RATE: 63 BPM

## 2017-06-13 DIAGNOSIS — F51.04 PSYCHOPHYSIOLOGICAL INSOMNIA: ICD-10-CM

## 2017-06-13 DIAGNOSIS — I25.10 CORONARY ARTERY DISEASE INVOLVING NATIVE CORONARY ARTERY OF NATIVE HEART, ANGINA PRESENCE UNSPECIFIED: ICD-10-CM

## 2017-06-13 DIAGNOSIS — G25.81 RESTLESS LEG SYNDROME: ICD-10-CM

## 2017-06-13 DIAGNOSIS — F41.8 DEPRESSION WITH ANXIETY: ICD-10-CM

## 2017-06-13 DIAGNOSIS — F41.9 ANXIETY: ICD-10-CM

## 2017-06-13 DIAGNOSIS — G47.62 NOCTURNAL LEG CRAMPS: ICD-10-CM

## 2017-06-13 DIAGNOSIS — M47.817 DJD (DEGENERATIVE JOINT DISEASE), LUMBOSACRAL: ICD-10-CM

## 2017-06-13 DIAGNOSIS — I10 ESSENTIAL HYPERTENSION: ICD-10-CM

## 2017-06-13 DIAGNOSIS — R26.81 UNSTABLE GAIT: ICD-10-CM

## 2017-06-13 DIAGNOSIS — F41.9 ANXIETY: Primary | ICD-10-CM

## 2017-06-13 DIAGNOSIS — E78.2 MIXED HYPERLIPIDEMIA: ICD-10-CM

## 2017-06-13 PROBLEM — B02.33 HERPES ZOSTER KERATITIS: Status: ACTIVE | Noted: 2017-02-13

## 2017-06-13 PROBLEM — B02.33 HERPES ZOSTER KERATOCONJUNCTIVITIS: Status: ACTIVE | Noted: 2017-02-16

## 2017-06-13 PROBLEM — G50.0: Status: ACTIVE | Noted: 2017-02-13

## 2017-06-13 PROBLEM — H40.059 OCULAR HYPERTENSION: Status: ACTIVE | Noted: 2017-02-16

## 2017-06-13 PROCEDURE — G8427 DOCREV CUR MEDS BY ELIG CLIN: HCPCS | Performed by: FAMILY MEDICINE

## 2017-06-13 PROCEDURE — G8420 CALC BMI NORM PARAMETERS: HCPCS | Performed by: FAMILY MEDICINE

## 2017-06-13 PROCEDURE — 99214 OFFICE O/P EST MOD 30 MIN: CPT | Performed by: FAMILY MEDICINE

## 2017-06-13 PROCEDURE — 3017F COLORECTAL CA SCREEN DOC REV: CPT | Performed by: FAMILY MEDICINE

## 2017-06-13 PROCEDURE — G8598 ASA/ANTIPLAT THER USED: HCPCS | Performed by: FAMILY MEDICINE

## 2017-06-13 PROCEDURE — 1036F TOBACCO NON-USER: CPT | Performed by: FAMILY MEDICINE

## 2017-06-13 RX ORDER — ATORVASTATIN CALCIUM 40 MG/1
40 TABLET, FILM COATED ORAL DAILY
Qty: 90 TABLET | Refills: 3 | Status: SHIPPED | OUTPATIENT
Start: 2017-06-13 | End: 2018-11-20 | Stop reason: SDUPTHER

## 2017-06-13 RX ORDER — ESCITALOPRAM OXALATE 20 MG/1
20 TABLET ORAL DAILY
Qty: 30 TABLET | Refills: 3 | Status: SHIPPED | OUTPATIENT
Start: 2017-06-13 | End: 2017-06-13 | Stop reason: SDUPTHER

## 2017-06-13 RX ORDER — ESCITALOPRAM OXALATE 20 MG/1
TABLET ORAL
Qty: 90 TABLET | Refills: 3 | Status: ON HOLD | OUTPATIENT
Start: 2017-06-13 | End: 2017-07-22

## 2017-06-13 RX ORDER — ALPRAZOLAM 0.25 MG/1
0.25 TABLET ORAL 2 TIMES DAILY PRN
Qty: 60 TABLET | Refills: 0 | Status: SHIPPED | OUTPATIENT
Start: 2017-06-13 | End: 2017-07-13 | Stop reason: SDUPTHER

## 2017-06-13 RX ORDER — CLOPIDOGREL BISULFATE 75 MG/1
75 TABLET ORAL DAILY
Qty: 90 TABLET | Refills: 3 | Status: SHIPPED | OUTPATIENT
Start: 2017-06-13 | End: 2018-06-28 | Stop reason: SDUPTHER

## 2017-06-13 RX ORDER — ROPINIROLE 1 MG/1
TABLET, FILM COATED ORAL
Qty: 90 TABLET | Refills: 3 | Status: SHIPPED | OUTPATIENT
Start: 2017-06-13 | End: 2018-05-23 | Stop reason: ALTCHOICE

## 2017-06-13 RX ORDER — ROPINIROLE 1 MG/1
1 TABLET, FILM COATED ORAL NIGHTLY
Qty: 30 TABLET | Refills: 3 | Status: SHIPPED | OUTPATIENT
Start: 2017-06-13 | End: 2017-06-13 | Stop reason: SDUPTHER

## 2017-06-13 ASSESSMENT — PATIENT HEALTH QUESTIONNAIRE - PHQ9
SUM OF ALL RESPONSES TO PHQ9 QUESTIONS 1 & 2: 2
2. FEELING DOWN, DEPRESSED OR HOPELESS: 1
SUM OF ALL RESPONSES TO PHQ QUESTIONS 1-9: 2
1. LITTLE INTEREST OR PLEASURE IN DOING THINGS: 1

## 2017-06-14 ASSESSMENT — ENCOUNTER SYMPTOMS
RHINORRHEA: 0
ABDOMINAL PAIN: 0
TROUBLE SWALLOWING: 0
CHEST TIGHTNESS: 1
CONSTIPATION: 0
DIARRHEA: 0
BACK PAIN: 1
SORE THROAT: 0
SHORTNESS OF BREATH: 1
NAUSEA: 0
COUGH: 0

## 2017-07-13 ENCOUNTER — OFFICE VISIT (OUTPATIENT)
Dept: FAMILY MEDICINE CLINIC | Age: 57
End: 2017-07-13
Payer: MEDICARE

## 2017-07-13 VITALS
HEART RATE: 66 BPM | SYSTOLIC BLOOD PRESSURE: 120 MMHG | BODY MASS INDEX: 21.79 KG/M2 | DIASTOLIC BLOOD PRESSURE: 60 MMHG | HEIGHT: 65 IN | WEIGHT: 130.8 LBS

## 2017-07-13 DIAGNOSIS — M47.817 DJD (DEGENERATIVE JOINT DISEASE), LUMBOSACRAL: ICD-10-CM

## 2017-07-13 DIAGNOSIS — F41.8 DEPRESSION WITH ANXIETY: ICD-10-CM

## 2017-07-13 DIAGNOSIS — F43.0 ANXIETY AS ACUTE REACTION TO EXCEPTIONAL STRESS: Primary | ICD-10-CM

## 2017-07-13 DIAGNOSIS — I25.10 CORONARY ARTERY DISEASE INVOLVING NATIVE CORONARY ARTERY OF NATIVE HEART, ANGINA PRESENCE UNSPECIFIED: ICD-10-CM

## 2017-07-13 DIAGNOSIS — F41.1 ANXIETY AS ACUTE REACTION TO EXCEPTIONAL STRESS: Primary | ICD-10-CM

## 2017-07-13 DIAGNOSIS — R07.2 PRECORDIAL PAIN: ICD-10-CM

## 2017-07-13 DIAGNOSIS — I10 ESSENTIAL HYPERTENSION: ICD-10-CM

## 2017-07-13 DIAGNOSIS — E78.2 MIXED HYPERLIPIDEMIA: ICD-10-CM

## 2017-07-13 DIAGNOSIS — F41.9 ANXIETY: ICD-10-CM

## 2017-07-13 PROCEDURE — 1036F TOBACCO NON-USER: CPT | Performed by: FAMILY MEDICINE

## 2017-07-13 PROCEDURE — 99214 OFFICE O/P EST MOD 30 MIN: CPT | Performed by: FAMILY MEDICINE

## 2017-07-13 PROCEDURE — G8420 CALC BMI NORM PARAMETERS: HCPCS | Performed by: FAMILY MEDICINE

## 2017-07-13 PROCEDURE — G8598 ASA/ANTIPLAT THER USED: HCPCS | Performed by: FAMILY MEDICINE

## 2017-07-13 PROCEDURE — 3017F COLORECTAL CA SCREEN DOC REV: CPT | Performed by: FAMILY MEDICINE

## 2017-07-13 PROCEDURE — G8427 DOCREV CUR MEDS BY ELIG CLIN: HCPCS | Performed by: FAMILY MEDICINE

## 2017-07-13 RX ORDER — ALPRAZOLAM 0.25 MG/1
TABLET ORAL
Qty: 60 TABLET | Refills: 0 | Status: SHIPPED | OUTPATIENT
Start: 2017-07-13 | End: 2017-08-14 | Stop reason: SDUPTHER

## 2017-07-13 RX ORDER — OXYCODONE AND ACETAMINOPHEN 10; 325 MG/1; MG/1
1 TABLET ORAL 3 TIMES DAILY
COMMUNITY
Start: 2017-07-12 | End: 2018-05-23 | Stop reason: ALTCHOICE

## 2017-07-13 ASSESSMENT — ENCOUNTER SYMPTOMS
TROUBLE SWALLOWING: 0
SHORTNESS OF BREATH: 1
CONSTIPATION: 0
BACK PAIN: 1
RHINORRHEA: 0
NAUSEA: 0
DIARRHEA: 0
ABDOMINAL PAIN: 0
COUGH: 0
SORE THROAT: 0
CHEST TIGHTNESS: 0

## 2017-07-22 ENCOUNTER — APPOINTMENT (OUTPATIENT)
Dept: GENERAL RADIOLOGY | Age: 57
End: 2017-07-22
Payer: MEDICARE

## 2017-07-22 ENCOUNTER — HOSPITAL ENCOUNTER (EMERGENCY)
Age: 57
Discharge: HOME | End: 2017-07-22
Attending: EMERGENCY MEDICINE
Payer: MEDICARE

## 2017-07-22 ENCOUNTER — APPOINTMENT (OUTPATIENT)
Dept: CT IMAGING | Age: 57
End: 2017-07-22
Payer: MEDICARE

## 2017-07-22 ENCOUNTER — HOSPITAL ENCOUNTER (OUTPATIENT)
Age: 57
Setting detail: OBSERVATION
Discharge: HOME | End: 2017-07-23
Attending: EMERGENCY MEDICINE | Admitting: INTERNAL MEDICINE
Payer: MEDICARE

## 2017-07-22 VITALS
DIASTOLIC BLOOD PRESSURE: 72 MMHG | SYSTOLIC BLOOD PRESSURE: 128 MMHG | RESPIRATION RATE: 12 BRPM | BODY MASS INDEX: 21.63 KG/M2 | WEIGHT: 130 LBS | OXYGEN SATURATION: 98 % | TEMPERATURE: 98.7 F | HEART RATE: 92 BPM

## 2017-07-22 DIAGNOSIS — F43.0 ANXIETY AS ACUTE REACTION TO EXCEPTIONAL STRESS: Primary | ICD-10-CM

## 2017-07-22 DIAGNOSIS — F41.1 ANXIETY AS ACUTE REACTION TO EXCEPTIONAL STRESS: Primary | ICD-10-CM

## 2017-07-22 DIAGNOSIS — I27.82 OTHER CHRONIC PULMONARY EMBOLISM WITHOUT ACUTE COR PULMONALE (HCC): ICD-10-CM

## 2017-07-22 DIAGNOSIS — I20.0 UNSTABLE ANGINA (HCC): Primary | ICD-10-CM

## 2017-07-22 LAB
ABSOLUTE EOS #: 0 K/UL (ref 0–0.4)
ABSOLUTE EOS #: 0 K/UL (ref 0–0.4)
ABSOLUTE LYMPH #: 1.8 K/UL (ref 1–4.8)
ABSOLUTE LYMPH #: 3 K/UL (ref 1–4.8)
ABSOLUTE MONO #: 0.7 K/UL (ref 0.1–1.3)
ABSOLUTE MONO #: 0.8 K/UL (ref 0.1–1.3)
ALBUMIN SERPL-MCNC: 4.6 G/DL (ref 3.5–5.2)
ALBUMIN/GLOBULIN RATIO: ABNORMAL (ref 1–2.5)
ALP BLD-CCNC: 45 U/L (ref 40–129)
ALT SERPL-CCNC: 23 U/L (ref 5–41)
ANION GAP SERPL CALCULATED.3IONS-SCNC: 17 MMOL/L (ref 9–17)
ANION GAP SERPL CALCULATED.3IONS-SCNC: 23 MMOL/L (ref 9–17)
AST SERPL-CCNC: 30 U/L
BASOPHILS # BLD: 0 %
BASOPHILS # BLD: 0 %
BASOPHILS ABSOLUTE: 0 K/UL (ref 0–0.2)
BASOPHILS ABSOLUTE: 0.1 K/UL (ref 0–0.2)
BILIRUB SERPL-MCNC: 0.87 MG/DL (ref 0.3–1.2)
BUN BLDV-MCNC: 10 MG/DL (ref 6–20)
BUN BLDV-MCNC: 12 MG/DL (ref 6–20)
BUN/CREAT BLD: ABNORMAL (ref 9–20)
BUN/CREAT BLD: ABNORMAL (ref 9–20)
CALCIUM SERPL-MCNC: 9.3 MG/DL (ref 8.6–10.4)
CALCIUM SERPL-MCNC: 9.5 MG/DL (ref 8.6–10.4)
CARBOXYHEMOGLOBIN: 1.7 % (ref 0–5)
CHLORIDE BLD-SCNC: 100 MMOL/L (ref 98–107)
CHLORIDE BLD-SCNC: 98 MMOL/L (ref 98–107)
CO2: 18 MMOL/L (ref 20–31)
CO2: 22 MMOL/L (ref 20–31)
CREAT SERPL-MCNC: 0.75 MG/DL (ref 0.7–1.2)
CREAT SERPL-MCNC: 1 MG/DL (ref 0.7–1.2)
DIFFERENTIAL TYPE: ABNORMAL
DIFFERENTIAL TYPE: ABNORMAL
EOSINOPHILS RELATIVE PERCENT: 0 %
EOSINOPHILS RELATIVE PERCENT: 0 %
ETHANOL PERCENT: 0.14 %
ETHANOL: 138 MG/DL
GFR AFRICAN AMERICAN: >60 ML/MIN
GFR AFRICAN AMERICAN: >60 ML/MIN
GFR NON-AFRICAN AMERICAN: >60 ML/MIN
GFR NON-AFRICAN AMERICAN: >60 ML/MIN
GFR SERPL CREATININE-BSD FRML MDRD: ABNORMAL ML/MIN/{1.73_M2}
GLUCOSE BLD-MCNC: 100 MG/DL (ref 70–99)
GLUCOSE BLD-MCNC: 139 MG/DL (ref 70–99)
HCT VFR BLD CALC: 42.6 % (ref 41–53)
HCT VFR BLD CALC: 42.8 % (ref 41–53)
HEMOGLOBIN: 14 G/DL (ref 13.5–17.5)
HEMOGLOBIN: 14.1 G/DL (ref 13.5–17.5)
INR BLD: 1
LIPASE: 27 U/L (ref 13–60)
LYMPHOCYTES # BLD: 14 %
LYMPHOCYTES # BLD: 25 %
MCH RBC QN AUTO: 30.1 PG (ref 26–34)
MCH RBC QN AUTO: 30.3 PG (ref 26–34)
MCHC RBC AUTO-ENTMCNC: 32.8 G/DL (ref 31–37)
MCHC RBC AUTO-ENTMCNC: 33.1 G/DL (ref 31–37)
MCV RBC AUTO: 91.5 FL (ref 80–100)
MCV RBC AUTO: 91.8 FL (ref 80–100)
MONOCYTES # BLD: 6 %
MONOCYTES # BLD: 6 %
PDW BLD-RTO: 13 % (ref 11.5–14.9)
PDW BLD-RTO: 13.1 % (ref 11.5–14.9)
PLATELET # BLD: 242 K/UL (ref 150–450)
PLATELET # BLD: 245 K/UL (ref 150–450)
PLATELET ESTIMATE: ABNORMAL
PLATELET ESTIMATE: ABNORMAL
PMV BLD AUTO: 8.3 FL (ref 6–12)
PMV BLD AUTO: 9 FL (ref 6–12)
POTASSIUM SERPL-SCNC: 3.5 MMOL/L (ref 3.7–5.3)
POTASSIUM SERPL-SCNC: 4.4 MMOL/L (ref 3.7–5.3)
PROTHROMBIN TIME: 10.6 SEC (ref 9.7–12)
RBC # BLD: 4.65 M/UL (ref 4.5–5.9)
RBC # BLD: 4.66 M/UL (ref 4.5–5.9)
RBC # BLD: ABNORMAL 10*6/UL
RBC # BLD: ABNORMAL 10*6/UL
SEG NEUTROPHILS: 69 %
SEG NEUTROPHILS: 80 %
SEGMENTED NEUTROPHILS ABSOLUTE COUNT: 8.1 K/UL (ref 1.3–9.1)
SEGMENTED NEUTROPHILS ABSOLUTE COUNT: 9.9 K/UL (ref 1.3–9.1)
SODIUM BLD-SCNC: 139 MMOL/L (ref 135–144)
SODIUM BLD-SCNC: 139 MMOL/L (ref 135–144)
TOTAL PROTEIN: 7.2 G/DL (ref 6.4–8.3)
TROPONIN INTERP: NORMAL
TROPONIN T: <0.03 NG/ML
WBC # BLD: 11.8 K/UL (ref 3.5–11)
WBC # BLD: 12.4 K/UL (ref 3.5–11)
WBC # BLD: ABNORMAL 10*3/UL
WBC # BLD: ABNORMAL 10*3/UL

## 2017-07-22 PROCEDURE — 71020 XR CHEST STANDARD TWO VW: CPT

## 2017-07-22 PROCEDURE — 80307 DRUG TEST PRSMV CHEM ANLYZR: CPT

## 2017-07-22 PROCEDURE — 36415 COLL VENOUS BLD VENIPUNCTURE: CPT

## 2017-07-22 PROCEDURE — G0480 DRUG TEST DEF 1-7 CLASSES: HCPCS

## 2017-07-22 PROCEDURE — 6360000004 HC RX CONTRAST MEDICATION: Performed by: EMERGENCY MEDICINE

## 2017-07-22 PROCEDURE — 99285 EMERGENCY DEPT VISIT HI MDM: CPT

## 2017-07-22 PROCEDURE — 80048 BASIC METABOLIC PNL TOTAL CA: CPT

## 2017-07-22 PROCEDURE — G0378 HOSPITAL OBSERVATION PER HR: HCPCS

## 2017-07-22 PROCEDURE — 96374 THER/PROPH/DIAG INJ IV PUSH: CPT

## 2017-07-22 PROCEDURE — 6360000002 HC RX W HCPCS: Performed by: EMERGENCY MEDICINE

## 2017-07-22 PROCEDURE — 6360000002 HC RX W HCPCS: Performed by: INTERNAL MEDICINE

## 2017-07-22 PROCEDURE — 82375 ASSAY CARBOXYHB QUANT: CPT

## 2017-07-22 PROCEDURE — 2580000003 HC RX 258: Performed by: INTERNAL MEDICINE

## 2017-07-22 PROCEDURE — 85610 PROTHROMBIN TIME: CPT

## 2017-07-22 PROCEDURE — 96372 THER/PROPH/DIAG INJ SC/IM: CPT

## 2017-07-22 PROCEDURE — 6370000000 HC RX 637 (ALT 250 FOR IP): Performed by: EMERGENCY MEDICINE

## 2017-07-22 PROCEDURE — 71275 CT ANGIOGRAPHY CHEST: CPT

## 2017-07-22 PROCEDURE — 93005 ELECTROCARDIOGRAM TRACING: CPT

## 2017-07-22 PROCEDURE — 71010 XR CHEST PORTABLE: CPT

## 2017-07-22 PROCEDURE — 80053 COMPREHEN METABOLIC PANEL: CPT

## 2017-07-22 PROCEDURE — 2580000003 HC RX 258: Performed by: EMERGENCY MEDICINE

## 2017-07-22 PROCEDURE — 6370000000 HC RX 637 (ALT 250 FOR IP): Performed by: INTERNAL MEDICINE

## 2017-07-22 PROCEDURE — 84484 ASSAY OF TROPONIN QUANT: CPT

## 2017-07-22 PROCEDURE — 96375 TX/PRO/DX INJ NEW DRUG ADDON: CPT

## 2017-07-22 PROCEDURE — 85025 COMPLETE CBC W/AUTO DIFF WBC: CPT

## 2017-07-22 PROCEDURE — 83690 ASSAY OF LIPASE: CPT

## 2017-07-22 RX ORDER — ACETAMINOPHEN 325 MG/1
650 TABLET ORAL EVERY 4 HOURS PRN
Status: DISCONTINUED | OUTPATIENT
Start: 2017-07-22 | End: 2017-07-23 | Stop reason: HOSPADM

## 2017-07-22 RX ORDER — DOCUSATE SODIUM 100 MG/1
100 CAPSULE, LIQUID FILLED ORAL 2 TIMES DAILY
Status: DISCONTINUED | OUTPATIENT
Start: 2017-07-22 | End: 2017-07-23 | Stop reason: HOSPADM

## 2017-07-22 RX ORDER — ASPIRIN 81 MG/1
81 TABLET ORAL DAILY
Status: DISCONTINUED | OUTPATIENT
Start: 2017-07-23 | End: 2017-07-22 | Stop reason: SDUPTHER

## 2017-07-22 RX ORDER — FENTANYL CITRATE 50 UG/ML
50 INJECTION, SOLUTION INTRAMUSCULAR; INTRAVENOUS ONCE
Status: COMPLETED | OUTPATIENT
Start: 2017-07-22 | End: 2017-07-22

## 2017-07-22 RX ORDER — POTASSIUM CHLORIDE 20MEQ/15ML
40 LIQUID (ML) ORAL PRN
Status: DISCONTINUED | OUTPATIENT
Start: 2017-07-22 | End: 2017-07-23 | Stop reason: HOSPADM

## 2017-07-22 RX ORDER — POTASSIUM CHLORIDE 7.45 MG/ML
10 INJECTION INTRAVENOUS PRN
Status: DISCONTINUED | OUTPATIENT
Start: 2017-07-22 | End: 2017-07-23 | Stop reason: HOSPADM

## 2017-07-22 RX ORDER — ASPIRIN 81 MG/1
81 TABLET, CHEWABLE ORAL DAILY
Status: DISCONTINUED | OUTPATIENT
Start: 2017-07-23 | End: 2017-07-23 | Stop reason: HOSPADM

## 2017-07-22 RX ORDER — MORPHINE SULFATE 2 MG/ML
2 INJECTION, SOLUTION INTRAMUSCULAR; INTRAVENOUS
Status: DISCONTINUED | OUTPATIENT
Start: 2017-07-22 | End: 2017-07-23 | Stop reason: HOSPADM

## 2017-07-22 RX ORDER — NITROGLYCERIN 0.4 MG/1
0.4 TABLET SUBLINGUAL EVERY 5 MIN PRN
Status: DISCONTINUED | OUTPATIENT
Start: 2017-07-22 | End: 2017-07-22 | Stop reason: SDUPTHER

## 2017-07-22 RX ORDER — BISACODYL 10 MG
10 SUPPOSITORY, RECTAL RECTAL DAILY PRN
Status: DISCONTINUED | OUTPATIENT
Start: 2017-07-22 | End: 2017-07-23 | Stop reason: HOSPADM

## 2017-07-22 RX ORDER — ALPRAZOLAM 0.25 MG/1
0.25 TABLET ORAL 2 TIMES DAILY PRN
Status: DISCONTINUED | OUTPATIENT
Start: 2017-07-22 | End: 2017-07-23 | Stop reason: HOSPADM

## 2017-07-22 RX ORDER — LISINOPRIL 10 MG/1
10 TABLET ORAL DAILY
Status: DISCONTINUED | OUTPATIENT
Start: 2017-07-22 | End: 2017-07-22

## 2017-07-22 RX ORDER — NITROGLYCERIN 0.4 MG/1
0.4 TABLET SUBLINGUAL EVERY 5 MIN PRN
Status: DISCONTINUED | OUTPATIENT
Start: 2017-07-22 | End: 2017-07-23 | Stop reason: HOSPADM

## 2017-07-22 RX ORDER — FAMOTIDINE 20 MG/1
20 TABLET, FILM COATED ORAL 2 TIMES DAILY
Status: DISCONTINUED | OUTPATIENT
Start: 2017-07-22 | End: 2017-07-22

## 2017-07-22 RX ORDER — CLOPIDOGREL BISULFATE 75 MG/1
75 TABLET ORAL DAILY
Status: DISCONTINUED | OUTPATIENT
Start: 2017-07-22 | End: 2017-07-23 | Stop reason: HOSPADM

## 2017-07-22 RX ORDER — MAGNESIUM SULFATE 1 G/100ML
1 INJECTION INTRAVENOUS PRN
Status: DISCONTINUED | OUTPATIENT
Start: 2017-07-22 | End: 2017-07-23 | Stop reason: HOSPADM

## 2017-07-22 RX ORDER — POTASSIUM CHLORIDE 20 MEQ/1
40 TABLET, EXTENDED RELEASE ORAL PRN
Status: DISCONTINUED | OUTPATIENT
Start: 2017-07-22 | End: 2017-07-23 | Stop reason: HOSPADM

## 2017-07-22 RX ORDER — FAMOTIDINE 20 MG/1
20 TABLET, FILM COATED ORAL 2 TIMES DAILY
Status: DISCONTINUED | OUTPATIENT
Start: 2017-07-22 | End: 2017-07-22 | Stop reason: SDUPTHER

## 2017-07-22 RX ORDER — ONDANSETRON 2 MG/ML
4 INJECTION INTRAMUSCULAR; INTRAVENOUS EVERY 6 HOURS PRN
Status: DISCONTINUED | OUTPATIENT
Start: 2017-07-22 | End: 2017-07-23 | Stop reason: HOSPADM

## 2017-07-22 RX ORDER — ATORVASTATIN CALCIUM 40 MG/1
40 TABLET, FILM COATED ORAL DAILY
Status: DISCONTINUED | OUTPATIENT
Start: 2017-07-22 | End: 2017-07-23 | Stop reason: HOSPADM

## 2017-07-22 RX ORDER — SODIUM CHLORIDE 0.9 % (FLUSH) 0.9 %
10 SYRINGE (ML) INJECTION PRN
Status: DISCONTINUED | OUTPATIENT
Start: 2017-07-22 | End: 2017-07-23 | Stop reason: HOSPADM

## 2017-07-22 RX ORDER — SODIUM CHLORIDE 0.9 % (FLUSH) 0.9 %
10 SYRINGE (ML) INJECTION PRN
Status: DISCONTINUED | OUTPATIENT
Start: 2017-07-22 | End: 2017-07-22 | Stop reason: SDUPTHER

## 2017-07-22 RX ORDER — 0.9 % SODIUM CHLORIDE 0.9 %
500 INTRAVENOUS SOLUTION INTRAVENOUS ONCE
Status: COMPLETED | OUTPATIENT
Start: 2017-07-22 | End: 2017-07-22

## 2017-07-22 RX ORDER — ESCITALOPRAM OXALATE 20 MG/1
1 TABLET ORAL DAILY
Status: DISCONTINUED | OUTPATIENT
Start: 2017-07-22 | End: 2017-07-22

## 2017-07-22 RX ORDER — SODIUM CHLORIDE 0.9 % (FLUSH) 0.9 %
10 SYRINGE (ML) INJECTION EVERY 12 HOURS SCHEDULED
Status: DISCONTINUED | OUTPATIENT
Start: 2017-07-22 | End: 2017-07-23 | Stop reason: HOSPADM

## 2017-07-22 RX ORDER — 0.9 % SODIUM CHLORIDE 0.9 %
100 INTRAVENOUS SOLUTION INTRAVENOUS ONCE
Status: COMPLETED | OUTPATIENT
Start: 2017-07-22 | End: 2017-07-22

## 2017-07-22 RX ORDER — ONDANSETRON 2 MG/ML
4 INJECTION INTRAMUSCULAR; INTRAVENOUS ONCE
Status: COMPLETED | OUTPATIENT
Start: 2017-07-22 | End: 2017-07-22

## 2017-07-22 RX ORDER — ROPINIROLE 1 MG/1
1 TABLET, FILM COATED ORAL NIGHTLY
Status: DISCONTINUED | OUTPATIENT
Start: 2017-07-22 | End: 2017-07-23 | Stop reason: HOSPADM

## 2017-07-22 RX ORDER — FENTANYL CITRATE 50 UG/ML
50 INJECTION, SOLUTION INTRAMUSCULAR; INTRAVENOUS ONCE
Status: DISCONTINUED | OUTPATIENT
Start: 2017-07-22 | End: 2017-07-22

## 2017-07-22 RX ADMIN — ONDANSETRON 4 MG: 2 INJECTION INTRAMUSCULAR; INTRAVENOUS at 13:18

## 2017-07-22 RX ADMIN — DOCUSATE SODIUM 100 MG: 100 CAPSULE, LIQUID FILLED ORAL at 20:43

## 2017-07-22 RX ADMIN — CLOPIDOGREL BISULFATE 75 MG: 75 TABLET ORAL at 18:33

## 2017-07-22 RX ADMIN — IOVERSOL 100 ML: 741 INJECTION INTRA-ARTERIAL; INTRAVENOUS at 13:44

## 2017-07-22 RX ADMIN — FENTANYL CITRATE 50 MCG: 50 INJECTION INTRAMUSCULAR; INTRAVENOUS at 13:18

## 2017-07-22 RX ADMIN — ENOXAPARIN SODIUM 40 MG: 40 INJECTION SUBCUTANEOUS at 18:33

## 2017-07-22 RX ADMIN — ROPINIROLE HYDROCHLORIDE 1 MG: 1 TABLET, FILM COATED ORAL at 20:43

## 2017-07-22 RX ADMIN — SODIUM CHLORIDE 100 ML: 9 INJECTION, SOLUTION INTRAVENOUS at 13:44

## 2017-07-22 RX ADMIN — ATORVASTATIN CALCIUM 40 MG: 40 TABLET, FILM COATED ORAL at 18:33

## 2017-07-22 RX ADMIN — Medication 10 ML: at 20:44

## 2017-07-22 RX ADMIN — MORPHINE SULFATE 2 MG: 2 INJECTION, SOLUTION INTRAMUSCULAR; INTRAVENOUS at 20:43

## 2017-07-22 RX ADMIN — NITROGLYCERIN 0.4 MG: 0.4 TABLET SUBLINGUAL at 13:18

## 2017-07-22 RX ADMIN — Medication 10 ML: at 13:44

## 2017-07-22 RX ADMIN — SODIUM CHLORIDE 500 ML: 9 INJECTION, SOLUTION INTRAVENOUS at 13:18

## 2017-07-22 ASSESSMENT — ENCOUNTER SYMPTOMS
COUGH: 1
VOMITING: 0
DIARRHEA: 0
NAUSEA: 0
EYE REDNESS: 0
VOMITING: 1
COLOR CHANGE: 0
NAUSEA: 1
ABDOMINAL DISTENTION: 0
EYE DISCHARGE: 0
SHORTNESS OF BREATH: 1
COUGH: 0
SHORTNESS OF BREATH: 1
RHINORRHEA: 0
APNEA: 1
PHOTOPHOBIA: 1
SORE THROAT: 0

## 2017-07-22 ASSESSMENT — PAIN SCALES - GENERAL
PAINLEVEL_OUTOF10: 8
PAINLEVEL_OUTOF10: 10
PAINLEVEL_OUTOF10: 4
PAINLEVEL_OUTOF10: 10
PAINLEVEL_OUTOF10: 7

## 2017-07-22 ASSESSMENT — PAIN DESCRIPTION - DESCRIPTORS: DESCRIPTORS: STABBING

## 2017-07-23 VITALS
OXYGEN SATURATION: 99 % | BODY MASS INDEX: 23.76 KG/M2 | HEIGHT: 65 IN | WEIGHT: 142.64 LBS | HEART RATE: 73 BPM | RESPIRATION RATE: 18 BRPM | SYSTOLIC BLOOD PRESSURE: 117 MMHG | DIASTOLIC BLOOD PRESSURE: 53 MMHG | TEMPERATURE: 98.9 F

## 2017-07-23 LAB
ABSOLUTE EOS #: 0.1 K/UL (ref 0–0.4)
ABSOLUTE LYMPH #: 2.7 K/UL (ref 1–4.8)
ABSOLUTE MONO #: 1 K/UL (ref 0.1–1.3)
AMPHETAMINE SCREEN URINE: NEGATIVE
ANION GAP SERPL CALCULATED.3IONS-SCNC: 14 MMOL/L (ref 9–17)
BARBITURATE SCREEN URINE: NEGATIVE
BASOPHILS # BLD: 0 %
BASOPHILS ABSOLUTE: 0 K/UL (ref 0–0.2)
BENZODIAZEPINE SCREEN, URINE: POSITIVE
BUN BLDV-MCNC: 12 MG/DL (ref 6–20)
BUN/CREAT BLD: ABNORMAL (ref 9–20)
BUPRENORPHINE URINE: ABNORMAL
CALCIUM SERPL-MCNC: 8.8 MG/DL (ref 8.6–10.4)
CANNABINOID SCREEN URINE: POSITIVE
CHLORIDE BLD-SCNC: 101 MMOL/L (ref 98–107)
CO2: 25 MMOL/L (ref 20–31)
COCAINE METABOLITE, URINE: NEGATIVE
CREAT SERPL-MCNC: 0.92 MG/DL (ref 0.7–1.2)
DIFFERENTIAL TYPE: ABNORMAL
EOSINOPHILS RELATIVE PERCENT: 1 %
GFR AFRICAN AMERICAN: >60 ML/MIN
GFR NON-AFRICAN AMERICAN: >60 ML/MIN
GFR SERPL CREATININE-BSD FRML MDRD: ABNORMAL ML/MIN/{1.73_M2}
GFR SERPL CREATININE-BSD FRML MDRD: ABNORMAL ML/MIN/{1.73_M2}
GLUCOSE BLD-MCNC: 136 MG/DL (ref 70–99)
HCT VFR BLD CALC: 39.6 % (ref 41–53)
HEMOGLOBIN: 13.3 G/DL (ref 13.5–17.5)
LYMPHOCYTES # BLD: 30 %
MCH RBC QN AUTO: 30.8 PG (ref 26–34)
MCHC RBC AUTO-ENTMCNC: 33.5 G/DL (ref 31–37)
MCV RBC AUTO: 91.7 FL (ref 80–100)
MDMA URINE: ABNORMAL
METHADONE SCREEN, URINE: NEGATIVE
METHAMPHETAMINE, URINE: ABNORMAL
MONOCYTES # BLD: 11 %
OPIATES, URINE: POSITIVE
OXYCODONE SCREEN URINE: POSITIVE
PDW BLD-RTO: 12.9 % (ref 11.5–14.9)
PHENCYCLIDINE, URINE: NEGATIVE
PLATELET # BLD: 219 K/UL (ref 150–450)
PLATELET ESTIMATE: ABNORMAL
PMV BLD AUTO: 8.2 FL (ref 6–12)
POTASSIUM SERPL-SCNC: 3.7 MMOL/L (ref 3.7–5.3)
PROPOXYPHENE, URINE: ABNORMAL
RBC # BLD: 4.32 M/UL (ref 4.5–5.9)
RBC # BLD: ABNORMAL 10*6/UL
SEG NEUTROPHILS: 58 %
SEGMENTED NEUTROPHILS ABSOLUTE COUNT: 5.1 K/UL (ref 1.3–9.1)
SODIUM BLD-SCNC: 140 MMOL/L (ref 135–144)
TEST INFORMATION: ABNORMAL
TRICYCLIC ANTIDEPRESSANTS, UR: ABNORMAL
WBC # BLD: 8.8 K/UL (ref 3.5–11)
WBC # BLD: ABNORMAL 10*3/UL

## 2017-07-23 PROCEDURE — 97165 OT EVAL LOW COMPLEX 30 MIN: CPT

## 2017-07-23 PROCEDURE — 6360000002 HC RX W HCPCS: Performed by: INTERNAL MEDICINE

## 2017-07-23 PROCEDURE — G8978 MOBILITY CURRENT STATUS: HCPCS

## 2017-07-23 PROCEDURE — G8987 SELF CARE CURRENT STATUS: HCPCS

## 2017-07-23 PROCEDURE — 97161 PT EVAL LOW COMPLEX 20 MIN: CPT

## 2017-07-23 PROCEDURE — 96376 TX/PRO/DX INJ SAME DRUG ADON: CPT

## 2017-07-23 PROCEDURE — G8989 SELF CARE D/C STATUS: HCPCS

## 2017-07-23 PROCEDURE — 6370000000 HC RX 637 (ALT 250 FOR IP): Performed by: INTERNAL MEDICINE

## 2017-07-23 PROCEDURE — 80048 BASIC METABOLIC PNL TOTAL CA: CPT

## 2017-07-23 PROCEDURE — 2580000003 HC RX 258: Performed by: INTERNAL MEDICINE

## 2017-07-23 PROCEDURE — G0378 HOSPITAL OBSERVATION PER HR: HCPCS

## 2017-07-23 PROCEDURE — G8988 SELF CARE GOAL STATUS: HCPCS

## 2017-07-23 PROCEDURE — 36415 COLL VENOUS BLD VENIPUNCTURE: CPT

## 2017-07-23 PROCEDURE — 99236 HOSP IP/OBS SAME DATE HI 85: CPT | Performed by: INTERNAL MEDICINE

## 2017-07-23 PROCEDURE — G8979 MOBILITY GOAL STATUS: HCPCS

## 2017-07-23 PROCEDURE — 96372 THER/PROPH/DIAG INJ SC/IM: CPT

## 2017-07-23 PROCEDURE — 85025 COMPLETE CBC W/AUTO DIFF WBC: CPT

## 2017-07-23 RX ADMIN — ALPRAZOLAM 0.25 MG: 0.25 TABLET ORAL at 09:14

## 2017-07-23 RX ADMIN — MORPHINE SULFATE 2 MG: 2 INJECTION, SOLUTION INTRAMUSCULAR; INTRAVENOUS at 00:13

## 2017-07-23 RX ADMIN — ENOXAPARIN SODIUM 40 MG: 40 INJECTION SUBCUTANEOUS at 07:27

## 2017-07-23 RX ADMIN — ONDANSETRON 4 MG: 2 INJECTION INTRAMUSCULAR; INTRAVENOUS at 00:13

## 2017-07-23 RX ADMIN — MORPHINE SULFATE 2 MG: 2 INJECTION, SOLUTION INTRAMUSCULAR; INTRAVENOUS at 04:42

## 2017-07-23 RX ADMIN — DOCUSATE SODIUM 100 MG: 100 CAPSULE, LIQUID FILLED ORAL at 07:27

## 2017-07-23 RX ADMIN — ASPIRIN 81 MG 81 MG: 81 TABLET ORAL at 07:27

## 2017-07-23 RX ADMIN — Medication 10 ML: at 07:28

## 2017-07-23 RX ADMIN — CLOPIDOGREL BISULFATE 75 MG: 75 TABLET ORAL at 07:27

## 2017-07-23 RX ADMIN — MORPHINE SULFATE 2 MG: 2 INJECTION, SOLUTION INTRAMUSCULAR; INTRAVENOUS at 07:27

## 2017-07-23 RX ADMIN — MORPHINE SULFATE 2 MG: 2 INJECTION, SOLUTION INTRAMUSCULAR; INTRAVENOUS at 10:49

## 2017-07-23 RX ADMIN — ATORVASTATIN CALCIUM 40 MG: 40 TABLET, FILM COATED ORAL at 07:27

## 2017-07-23 ASSESSMENT — PAIN DESCRIPTION - DESCRIPTORS
DESCRIPTORS: SHARP
DESCRIPTORS: SHARP

## 2017-07-23 ASSESSMENT — PAIN DESCRIPTION - LOCATION
LOCATION: BACK;CHEST
LOCATION: CHEST
LOCATION: CHEST

## 2017-07-23 ASSESSMENT — PAIN DESCRIPTION - PAIN TYPE
TYPE: ACUTE PAIN
TYPE: ACUTE PAIN

## 2017-07-23 ASSESSMENT — PAIN DESCRIPTION - FREQUENCY
FREQUENCY: INTERMITTENT
FREQUENCY: INTERMITTENT

## 2017-07-23 ASSESSMENT — PAIN SCALES - GENERAL
PAINLEVEL_OUTOF10: 8
PAINLEVEL_OUTOF10: 8
PAINLEVEL_OUTOF10: 7
PAINLEVEL_OUTOF10: 8
PAINLEVEL_OUTOF10: 7

## 2017-07-23 ASSESSMENT — ENCOUNTER SYMPTOMS
PHOTOPHOBIA: 1
EYE PAIN: 0
EYE DISCHARGE: 0
SHORTNESS OF BREATH: 0
ABDOMINAL DISTENTION: 0

## 2017-07-23 ASSESSMENT — PAIN DESCRIPTION - ORIENTATION
ORIENTATION: LEFT
ORIENTATION: LEFT

## 2017-07-24 LAB
EKG ATRIAL RATE: 68 BPM
EKG P AXIS: 48 DEGREES
EKG P-R INTERVAL: 118 MS
EKG Q-T INTERVAL: 392 MS
EKG QRS DURATION: 100 MS
EKG QTC CALCULATION (BAZETT): 416 MS
EKG R AXIS: 51 DEGREES
EKG T AXIS: 61 DEGREES
EKG VENTRICULAR RATE: 68 BPM

## 2017-07-26 LAB
EKG ATRIAL RATE: 90 BPM
EKG P AXIS: 50 DEGREES
EKG P-R INTERVAL: 132 MS
EKG Q-T INTERVAL: 362 MS
EKG QRS DURATION: 96 MS
EKG QTC CALCULATION (BAZETT): 442 MS
EKG R AXIS: 63 DEGREES
EKG T AXIS: 60 DEGREES
EKG VENTRICULAR RATE: 90 BPM

## 2017-08-02 ENCOUNTER — OFFICE VISIT (OUTPATIENT)
Dept: FAMILY MEDICINE CLINIC | Age: 57
End: 2017-08-02
Payer: MEDICARE

## 2017-08-02 VITALS
DIASTOLIC BLOOD PRESSURE: 73 MMHG | WEIGHT: 127.25 LBS | HEART RATE: 78 BPM | HEIGHT: 65 IN | BODY MASS INDEX: 21.2 KG/M2 | SYSTOLIC BLOOD PRESSURE: 121 MMHG

## 2017-08-02 DIAGNOSIS — F41.9 ANXIETY: ICD-10-CM

## 2017-08-02 DIAGNOSIS — S80.12XA CONTUSION OF LEFT LOWER LEG, INITIAL ENCOUNTER: ICD-10-CM

## 2017-08-02 DIAGNOSIS — M17.0 PRIMARY OSTEOARTHRITIS OF BOTH KNEES: ICD-10-CM

## 2017-08-02 DIAGNOSIS — S80.02XA CONTUSION OF LEFT KNEE, INITIAL ENCOUNTER: ICD-10-CM

## 2017-08-02 DIAGNOSIS — S40.022A HEMATOMA OF ARM, LEFT, INITIAL ENCOUNTER: ICD-10-CM

## 2017-08-02 DIAGNOSIS — I25.10 CORONARY ARTERY DISEASE INVOLVING NATIVE CORONARY ARTERY OF NATIVE HEART, ANGINA PRESENCE UNSPECIFIED: ICD-10-CM
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
## 2017-08-02 DIAGNOSIS — R26.81 UNSTABLE GAIT: ICD-10-CM

## 2017-08-02 PROBLEM — S40.029A HEMATOMA OF ARM: Status: ACTIVE | Noted: 2017-08-02

## 2017-08-02 PROCEDURE — G8427 DOCREV CUR MEDS BY ELIG CLIN: HCPCS | Performed by: FAMILY MEDICINE

## 2017-08-02 PROCEDURE — 1036F TOBACCO NON-USER: CPT | Performed by: FAMILY MEDICINE

## 2017-08-02 PROCEDURE — 3017F COLORECTAL CA SCREEN DOC REV: CPT | Performed by: FAMILY MEDICINE

## 2017-08-02 PROCEDURE — G8420 CALC BMI NORM PARAMETERS: HCPCS | Performed by: FAMILY MEDICINE

## 2017-08-02 PROCEDURE — 99214 OFFICE O/P EST MOD 30 MIN: CPT | Performed by: FAMILY MEDICINE

## 2017-08-02 PROCEDURE — G8598 ASA/ANTIPLAT THER USED: HCPCS | Performed by: FAMILY MEDICINE

## 2017-08-07 ENCOUNTER — HOSPITAL ENCOUNTER (OUTPATIENT)
Dept: GENERAL RADIOLOGY | Age: 57
Discharge: HOME | End: 2017-08-07
Payer: MEDICARE

## 2017-08-07 ENCOUNTER — HOSPITAL ENCOUNTER (OUTPATIENT)
Age: 57
Discharge: HOME | End: 2017-08-07
Payer: MEDICARE

## 2017-08-07 DIAGNOSIS — S80.12XA CONTUSION OF LEFT LOWER LEG, INITIAL ENCOUNTER: ICD-10-CM

## 2017-08-07 PROCEDURE — 73552 X-RAY EXAM OF FEMUR 2/>: CPT

## 2017-08-07 PROCEDURE — 73562 X-RAY EXAM OF KNEE 3: CPT

## 2017-08-14 DIAGNOSIS — F41.9 ANXIETY: ICD-10-CM

## 2017-08-14 DIAGNOSIS — F41.8 DEPRESSION WITH ANXIETY: ICD-10-CM

## 2017-08-14 RX ORDER — ALPRAZOLAM 0.25 MG/1
TABLET ORAL
Qty: 60 TABLET | Refills: 0 | Status: SHIPPED | OUTPATIENT
Start: 2017-08-14 | End: 2017-09-13 | Stop reason: SDUPTHER

## 2017-08-16 ENCOUNTER — OFFICE VISIT (OUTPATIENT)
Dept: FAMILY MEDICINE CLINIC | Age: 57
End: 2017-08-16
Payer: MEDICARE

## 2017-08-16 VITALS
DIASTOLIC BLOOD PRESSURE: 73 MMHG | HEIGHT: 65 IN | SYSTOLIC BLOOD PRESSURE: 122 MMHG | HEART RATE: 72 BPM | WEIGHT: 136.31 LBS | BODY MASS INDEX: 22.71 KG/M2

## 2017-08-16 DIAGNOSIS — S40.022D HEMATOMA OF ARM, LEFT, SUBSEQUENT ENCOUNTER: Primary | ICD-10-CM

## 2017-08-16 DIAGNOSIS — S80.12XD CONTUSION OF LEG, LEFT, SUBSEQUENT ENCOUNTER: ICD-10-CM
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
## 2017-08-16 DIAGNOSIS — I10 ESSENTIAL HYPERTENSION: ICD-10-CM

## 2017-08-16 DIAGNOSIS — S80.02XD CONTUSION OF LEFT KNEE, SUBSEQUENT ENCOUNTER: ICD-10-CM

## 2017-08-16 DIAGNOSIS — F41.9 ANXIETY: ICD-10-CM

## 2017-08-16 DIAGNOSIS — I20.0 UNSTABLE ANGINA (HCC): ICD-10-CM

## 2017-08-16 DIAGNOSIS — S80.12XD CONTUSION OF LEFT LOWER LEG, SUBSEQUENT ENCOUNTER: ICD-10-CM

## 2017-08-16 DIAGNOSIS — F32.9 REACTIVE DEPRESSION: ICD-10-CM

## 2017-08-16 DIAGNOSIS — F43.9 STRESS: ICD-10-CM

## 2017-08-16 DIAGNOSIS — F51.04 PSYCHOPHYSIOLOGICAL INSOMNIA: ICD-10-CM

## 2017-08-16 DIAGNOSIS — R26.81 UNSTABLE GAIT: ICD-10-CM

## 2017-08-16 PROBLEM — S80.12XA CONTUSION OF LEG, LEFT: Status: ACTIVE | Noted: 2017-08-16

## 2017-08-16 PROCEDURE — 99214 OFFICE O/P EST MOD 30 MIN: CPT | Performed by: FAMILY MEDICINE

## 2017-08-16 PROCEDURE — 3017F COLORECTAL CA SCREEN DOC REV: CPT | Performed by: FAMILY MEDICINE

## 2017-08-16 PROCEDURE — G8427 DOCREV CUR MEDS BY ELIG CLIN: HCPCS | Performed by: FAMILY MEDICINE

## 2017-08-16 PROCEDURE — G8420 CALC BMI NORM PARAMETERS: HCPCS | Performed by: FAMILY MEDICINE

## 2017-08-16 PROCEDURE — G8598 ASA/ANTIPLAT THER USED: HCPCS | Performed by: FAMILY MEDICINE

## 2017-08-16 PROCEDURE — 1036F TOBACCO NON-USER: CPT | Performed by: FAMILY MEDICINE

## 2017-08-24 ASSESSMENT — ENCOUNTER SYMPTOMS
SORE THROAT: 0
COUGH: 0
CONSTIPATION: 0
BACK PAIN: 0
CHEST TIGHTNESS: 0
SHORTNESS OF BREATH: 1
TROUBLE SWALLOWING: 0
NAUSEA: 0
RHINORRHEA: 0
DIARRHEA: 0
ABDOMINAL PAIN: 0

## 2017-09-02 ASSESSMENT — ENCOUNTER SYMPTOMS
SHORTNESS OF BREATH: 1
BACK PAIN: 1
CONSTIPATION: 0
CHEST TIGHTNESS: 0
DIARRHEA: 0
SORE THROAT: 0
TROUBLE SWALLOWING: 0
COUGH: 0
RHINORRHEA: 0
ABDOMINAL PAIN: 0
NAUSEA: 0

## 2017-09-13 ENCOUNTER — OFFICE VISIT (OUTPATIENT)
Dept: FAMILY MEDICINE CLINIC | Age: 57
End: 2017-09-13
Payer: MEDICARE

## 2017-09-13 DIAGNOSIS — R26.81 UNSTABLE GAIT: ICD-10-CM

## 2017-09-13 DIAGNOSIS — F41.9 ANXIETY: ICD-10-CM

## 2017-09-13 DIAGNOSIS — F41.8 DEPRESSION WITH ANXIETY: ICD-10-CM

## 2017-09-13 DIAGNOSIS — S80.12XD CONTUSION OF LEG, LEFT, SUBSEQUENT ENCOUNTER: Primary | ICD-10-CM

## 2017-09-13 DIAGNOSIS — F32.9 REACTIVE DEPRESSION: ICD-10-CM

## 2017-09-13 DIAGNOSIS — I25.10 CORONARY ARTERY DISEASE INVOLVING NATIVE CORONARY ARTERY OF NATIVE HEART, ANGINA PRESENCE UNSPECIFIED: ICD-10-CM

## 2017-09-13 DIAGNOSIS — I10 ESSENTIAL HYPERTENSION: ICD-10-CM

## 2017-09-13 PROCEDURE — G8420 CALC BMI NORM PARAMETERS: HCPCS | Performed by: FAMILY MEDICINE

## 2017-09-13 PROCEDURE — 99213 OFFICE O/P EST LOW 20 MIN: CPT | Performed by: FAMILY MEDICINE

## 2017-09-13 PROCEDURE — G8598 ASA/ANTIPLAT THER USED: HCPCS | Performed by: FAMILY MEDICINE

## 2017-09-13 PROCEDURE — 1036F TOBACCO NON-USER: CPT | Performed by: FAMILY MEDICINE

## 2017-09-13 PROCEDURE — 3017F COLORECTAL CA SCREEN DOC REV: CPT | Performed by: FAMILY MEDICINE

## 2017-09-13 PROCEDURE — G8427 DOCREV CUR MEDS BY ELIG CLIN: HCPCS | Performed by: FAMILY MEDICINE

## 2017-09-13 RX ORDER — ALPRAZOLAM 0.25 MG/1
0.25 TABLET ORAL NIGHTLY PRN
Qty: 60 TABLET | Refills: 0 | Status: SHIPPED | OUTPATIENT
Start: 2017-09-13 | End: 2017-10-04 | Stop reason: SDUPTHER

## 2017-09-13 ASSESSMENT — ENCOUNTER SYMPTOMS
ABDOMINAL PAIN: 0
BACK PAIN: 1
TROUBLE SWALLOWING: 0
COUGH: 0
CHEST TIGHTNESS: 0
NAUSEA: 0
SORE THROAT: 0
SHORTNESS OF BREATH: 0
CONSTIPATION: 0
RHINORRHEA: 0
DIARRHEA: 0

## 2017-09-14 ENCOUNTER — HOSPITAL ENCOUNTER (OUTPATIENT)
Age: 57
Discharge: HOME | End: 2017-09-14
Payer: MEDICARE

## 2017-09-14 ENCOUNTER — HOSPITAL ENCOUNTER (OUTPATIENT)
Dept: GENERAL RADIOLOGY | Age: 57
Discharge: HOME | End: 2017-09-14
Payer: MEDICARE

## 2017-09-14 DIAGNOSIS — S80.12XD CONTUSION OF LEG, LEFT, SUBSEQUENT ENCOUNTER: ICD-10-CM

## 2017-09-14 DIAGNOSIS — S80.12XD CONTUSION OF LEFT LOWER LEG, SUBSEQUENT ENCOUNTER: ICD-10-CM

## 2017-09-14 PROCEDURE — 73590 X-RAY EXAM OF LOWER LEG: CPT

## 2017-10-04 ENCOUNTER — OFFICE VISIT (OUTPATIENT)
Dept: FAMILY MEDICINE CLINIC | Age: 57
End: 2017-10-04
Payer: MEDICARE

## 2017-10-04 VITALS
WEIGHT: 130.25 LBS | BODY MASS INDEX: 21.7 KG/M2 | SYSTOLIC BLOOD PRESSURE: 124 MMHG | HEIGHT: 65 IN | DIASTOLIC BLOOD PRESSURE: 76 MMHG | HEART RATE: 61 BPM

## 2017-10-04 DIAGNOSIS — F43.0 ANXIETY AS ACUTE REACTION TO EXCEPTIONAL STRESS: ICD-10-CM

## 2017-10-04 DIAGNOSIS — R26.81 UNSTABLE GAIT: ICD-10-CM

## 2017-10-04 DIAGNOSIS — S83.91XD SPRAIN OF RIGHT KNEE, UNSPECIFIED LIGAMENT, SUBSEQUENT ENCOUNTER: ICD-10-CM

## 2017-10-04 DIAGNOSIS — F32.9 REACTIVE DEPRESSION: ICD-10-CM

## 2017-10-04 DIAGNOSIS — I10 ESSENTIAL HYPERTENSION: ICD-10-CM

## 2017-10-04 DIAGNOSIS — I25.10 CORONARY ARTERY DISEASE INVOLVING NATIVE CORONARY ARTERY OF NATIVE HEART, ANGINA PRESENCE UNSPECIFIED: ICD-10-CM

## 2017-10-04 DIAGNOSIS — F41.1 ANXIETY AS ACUTE REACTION TO EXCEPTIONAL STRESS: ICD-10-CM

## 2017-10-04 DIAGNOSIS — S83.92XD SPRAIN OF LEFT KNEE, UNSPECIFIED LIGAMENT, SUBSEQUENT ENCOUNTER: ICD-10-CM
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
## 2017-10-04 DIAGNOSIS — S80.12XD CONTUSION OF LEG, LEFT, SUBSEQUENT ENCOUNTER: ICD-10-CM

## 2017-10-04 PROCEDURE — G8420 CALC BMI NORM PARAMETERS: HCPCS | Performed by: FAMILY MEDICINE

## 2017-10-04 PROCEDURE — G8427 DOCREV CUR MEDS BY ELIG CLIN: HCPCS | Performed by: FAMILY MEDICINE

## 2017-10-04 PROCEDURE — G8484 FLU IMMUNIZE NO ADMIN: HCPCS | Performed by: FAMILY MEDICINE

## 2017-10-04 PROCEDURE — G8598 ASA/ANTIPLAT THER USED: HCPCS | Performed by: FAMILY MEDICINE

## 2017-10-04 PROCEDURE — 1036F TOBACCO NON-USER: CPT | Performed by: FAMILY MEDICINE

## 2017-10-04 PROCEDURE — 3017F COLORECTAL CA SCREEN DOC REV: CPT | Performed by: FAMILY MEDICINE

## 2017-10-04 PROCEDURE — 99214 OFFICE O/P EST MOD 30 MIN: CPT | Performed by: FAMILY MEDICINE

## 2017-10-04 RX ORDER — NITROGLYCERIN 0.4 MG/1
0.4 TABLET SUBLINGUAL EVERY 5 MIN PRN
Qty: 25 TABLET | Refills: 1 | Status: SHIPPED | OUTPATIENT
Start: 2017-10-04 | End: 2019-05-10

## 2017-10-04 RX ORDER — ALPRAZOLAM 0.25 MG/1
0.25 TABLET ORAL NIGHTLY PRN
Qty: 60 TABLET | Refills: 0 | Status: SHIPPED | OUTPATIENT
Start: 2017-10-04 | End: 2017-11-13 | Stop reason: SDUPTHER

## 2017-10-04 ASSESSMENT — ENCOUNTER SYMPTOMS
ABDOMINAL PAIN: 0
TROUBLE SWALLOWING: 0
DIARRHEA: 0
CONSTIPATION: 0
COUGH: 0
BACK PAIN: 1
SORE THROAT: 0
NAUSEA: 0
SHORTNESS OF BREATH: 0
RHINORRHEA: 0
CHEST TIGHTNESS: 0
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
## 2017-10-04 NOTE — PROGRESS NOTES
Visit Information    Have you changed or started any medications since your last visit including any over-the-counter medicines, vitamins, or herbal medicines? no   Have you stopped taking any of your medications? Is so, why? -  no  Are you having any side effects from any of your medications? - no    Have you seen any other physician or provider since your last visit? yes - [facility]   Have you had any other diagnostic tests since your last visit? yes - x-ray of left leg   Have you been seen in the emergency room and/or had an admission in a hospital since we last saw you?  no   Have you had your routine dental cleaning in the past 6 months?  no     Do you have an active MyChart account? If no, what is the barrier?  yes    Patient Care Team:  Frances Wallace MD as PCP - General (Family Medicine)  Avtar Hsieh MD as Consulting Physician (Gastroenterology)    Medical History Review  Past Medical, Family, and Social History reviewed and does contribute to the patient presenting condition    Health Maintenance   Topic Date Due    Colon cancer screen colonoscopy  05/11/2008    Flu vaccine (1) 10/15/2017 (Originally 9/1/2017)    Lipid screen  02/24/2022    DTaP/Tdap/Td vaccine (2 - Td) 04/04/2027    Hepatitis C screen  Addressed    HIV screen  Addressed
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
## 2017-10-04 NOTE — PATIENT INSTRUCTIONS
are not able to stand or walk or bear weight. · Your buttocks, legs, or feet feel numb or tingly. · Your leg or foot is cool or pale or changes color. · You have severe pain. Call your doctor now or seek immediate medical care if:  · You have signs of infection, such as:  ¨ Increased pain, swelling, warmth, or redness in the hip area. ¨ Red streaks leading from the hip area. ¨ Pus draining from the hip area. ¨ A fever. · You have signs of a blood clot, such as:  ¨ Pain in your calf, back of the knee, thigh, or groin. ¨ Redness and swelling in your leg or groin. · You are not able to bend, straighten, or move your leg normally. · You have trouble urinating or having bowel movements. Watch closely for changes in your health, and be sure to contact your doctor if:  · You do not get better as expected. Where can you learn more? Go to [email or web address]://[employer]. org and sign in to your [employer] account. Enter J873 in the [employer] box to learn more about \"Hip Pain: Care Instructions. \"     If you do not have an account, please click on the \"Sign Up Now\" link. Current as of: March 20, 2017  Content Version: 11.3  © 5734-6402 [employer]. Care instructions adapted under license by [facility] [address] If you have questions about a medical condition or this instruction, always ask your healthcare professional. [place] [address] any warranty or liability for your use of this information.

## 2017-11-01 ENCOUNTER — OFFICE VISIT (OUTPATIENT)
Dept: FAMILY MEDICINE CLINIC | Age: 57
End: 2017-11-01
Payer: MEDICARE

## 2017-11-01 VITALS
DIASTOLIC BLOOD PRESSURE: 74 MMHG | HEIGHT: 65 IN | HEART RATE: 72 BPM | BODY MASS INDEX: 21.99 KG/M2 | SYSTOLIC BLOOD PRESSURE: 123 MMHG | WEIGHT: 132 LBS

## 2017-11-01 DIAGNOSIS — I25.10 CORONARY ARTERY DISEASE INVOLVING NATIVE CORONARY ARTERY OF NATIVE HEART, ANGINA PRESENCE UNSPECIFIED: ICD-10-CM

## 2017-11-01 DIAGNOSIS — R26.81 UNSTABLE GAIT: ICD-10-CM

## 2017-11-01 DIAGNOSIS — I10 ESSENTIAL HYPERTENSION: ICD-10-CM

## 2017-11-01 DIAGNOSIS — S80.12XD CONTUSION OF LEFT LOWER EXTREMITY, SUBSEQUENT ENCOUNTER: Primary | ICD-10-CM

## 2017-11-01 DIAGNOSIS — I20.0 UNSTABLE ANGINA (HCC): ICD-10-CM

## 2017-11-01 DIAGNOSIS — M17.0 PRIMARY OSTEOARTHRITIS OF BOTH KNEES: ICD-10-CM

## 2017-11-01 DIAGNOSIS — F41.9 ANXIETY: ICD-10-CM

## 2017-11-01 DIAGNOSIS — F51.04 PSYCHOPHYSIOLOGICAL INSOMNIA: ICD-10-CM

## 2017-11-01 DIAGNOSIS — E78.2 MIXED HYPERLIPIDEMIA: ICD-10-CM

## 2017-11-01 DIAGNOSIS — M47.817 DJD (DEGENERATIVE JOINT DISEASE), LUMBOSACRAL: ICD-10-CM

## 2017-11-01 DIAGNOSIS — F32.9 REACTIVE DEPRESSION: ICD-10-CM

## 2017-11-01 DIAGNOSIS — S80.02XD CONTUSION OF LEFT KNEE, SUBSEQUENT ENCOUNTER: ICD-10-CM

## 2017-11-01 PROCEDURE — 3017F COLORECTAL CA SCREEN DOC REV: CPT | Performed by: FAMILY MEDICINE

## 2017-11-01 PROCEDURE — G8598 ASA/ANTIPLAT THER USED: HCPCS | Performed by: FAMILY MEDICINE

## 2017-11-01 PROCEDURE — G8484 FLU IMMUNIZE NO ADMIN: HCPCS | Performed by: FAMILY MEDICINE

## 2017-11-01 PROCEDURE — G8420 CALC BMI NORM PARAMETERS: HCPCS | Performed by: FAMILY MEDICINE

## 2017-11-01 PROCEDURE — G8427 DOCREV CUR MEDS BY ELIG CLIN: HCPCS | Performed by: FAMILY MEDICINE

## 2017-11-01 PROCEDURE — 1036F TOBACCO NON-USER: CPT | Performed by: FAMILY MEDICINE

## 2017-11-01 PROCEDURE — 99214 OFFICE O/P EST MOD 30 MIN: CPT | Performed by: FAMILY MEDICINE

## 2017-11-01 RX ORDER — TRAZODONE HYDROCHLORIDE 50 MG/1
50 TABLET ORAL NIGHTLY
Qty: 30 TABLET | Refills: 3 | Status: SHIPPED | OUTPATIENT
Start: 2017-11-01 | End: 2018-10-12 | Stop reason: SDUPTHER

## 2017-11-01 ASSESSMENT — ENCOUNTER SYMPTOMS
SORE THROAT: 0
ABDOMINAL PAIN: 0
NAUSEA: 0
SHORTNESS OF BREATH: 0
TROUBLE SWALLOWING: 0
BACK PAIN: 1
CONSTIPATION: 0
DIARRHEA: 0
COUGH: 0
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
## 2017-11-01 NOTE — PROGRESS NOTES
Visit Information    Have you changed or started any medications since your last visit including any over-the-counter medicines, vitamins, or herbal medicines? no   Have you stopped taking any of your medications? Is so, why? -  no  Are you having any side effects from any of your medications? - no    Have you seen any other physician or provider since your last visit? yes - Physical therapy   Have you had any other diagnostic tests since your last visit?  no   Have you been seen in the emergency room and/or had an admission in a hospital since we last saw you?  no   Have you had your routine dental cleaning in the past 6 months?  no     Do you have an active MyChart account? If no, what is the barrier?   Yes    Patient Care Team:  Carlyle Diego MD as PCP - General (Family Medicine)  Benji Narayan MD as Consulting Physician (Gastroenterology)    Medical History Review  Past Medical, Family, and Social History reviewed and does contribute to the patient presenting condition    Health Maintenance   Topic Date Due    Colon cancer screen colonoscopy  05/11/2008    Flu vaccine (1) 09/01/2017    Lipid screen  02/24/2022    DTaP/Tdap/Td vaccine (2 - Td) 04/04/2027    Hepatitis C screen  Addressed    HIV screen  Addressed

## 2017-11-01 NOTE — PATIENT INSTRUCTIONS
Leg Pain: Care Instructions  Your Care Instructions  Many things can cause leg pain. Too much exercise or overuse can cause a muscle cramp (or charley horse). You can get leg cramps from not eating a balanced diet that has enough potassium, calcium, and other minerals. If you do not drink enough fluids or are taking certain medicines, you may develop leg cramps. Other causes of leg pain include injuries, blood flow problems, nerve damage, and twisted and enlarged veins (varicose veins). You can usually ease pain with self-care. Your doctor may recommend that you rest your leg and keep it elevated. Follow-up care is a key part of your treatment and safety. Be sure to make and go to all appointments, and call your doctor if you are having problems. Its also a good idea to know your test results and keep a list of the medicines you take. How can you care for yourself at home? · Take pain medicines exactly as directed. ¨ If the doctor gave you a prescription medicine for pain, take it as prescribed. ¨ If you are not taking a prescription pain medicine, ask your doctor if you can take an over-the-counter medicine. · Take any other medicines exactly as prescribed. Call your doctor if you think you are having a problem with your medicine. · Rest your leg while you have pain, and avoid standing for long periods of time. · Prop up your leg at or above the level of your heart when possible. · Make sure you are eating a balanced diet that is rich in calcium, potassium, and magnesium, especially if you are pregnant. · If directed by your doctor, put ice or a cold pack on the area for 10 to 20 minutes at a time. Put a thin cloth between the ice and your skin. · Your leg may be in a splint, a brace, or an elastic bandage, and you may have crutches to help you walk. Follow your doctor's directions about how long to wear supports and how to use the crutches. When should you call for help?   Call 911 anytime you

## 2017-11-13 DIAGNOSIS — F43.0 ANXIETY AS ACUTE REACTION TO EXCEPTIONAL STRESS: ICD-10-CM

## 2017-11-13 DIAGNOSIS — F41.1 ANXIETY AS ACUTE REACTION TO EXCEPTIONAL STRESS: ICD-10-CM

## 2017-11-13 RX ORDER — ALPRAZOLAM 0.25 MG/1
TABLET ORAL
Qty: 60 TABLET | Refills: 0 | Status: SHIPPED | OUTPATIENT
Start: 2017-11-13 | End: 2018-05-08 | Stop reason: SDUPTHER

## 2017-11-13 NOTE — TELEPHONE ENCOUNTER
Requested Prescriptions     Pending Prescriptions Disp Refills    ALPRAZolam (XANAX) 0.25 MG tablet [Pharmacy Med Name: ALPRAZOLAM 0.25MG TABLETS] 60 tablet 0     Sig: TAKE 1 TABLET BY MOUTH EVERY NIGHT AS NEEDED FOR SLEEP     Next Visit Date:  Future Appointments  Date Time Provider Kris Gautam   2/1/2018 3:30 PM Amarjit Bergeron MD [id number] Maintenance   Topic Date Due    Colon cancer screen colonoscopy  05/11/2008    Flu vaccine (1) 09/01/2017    Lipid screen  02/24/2022    DTaP/Tdap/Td vaccine (2 - Td) 04/04/2027    Hepatitis C screen  Addressed    HIV screen  Addressed       Hemoglobin A1C (%)   Date Value   09/22/2014 5.7   05/30/2014 5.3             ( goal A1C is < 7)   No results found for: LABMICR  LDL Cholesterol (mg/dL)   Date Value   02/24/2017 54       (goal LDL is <100)   AST (U/L)   Date Value   07/22/2017 30     ALT (U/L)   Date Value   07/22/2017 23     BUN (mg/dL)   Date Value   07/23/2017 12     BP Readings from Last 3 Encounters:   11/01/17 123/74   10/04/17 124/76   08/16/17 122/73          (goal 120/80)    All Future Testing planned in [facility]  Lab Frequency Next Occurrence               Patient Active Problem List:     CAD (coronary artery disease) s/p CABGx4, 10 stents     Anxiety     Neuropathy, arm     Chest pain with high risk of acute coronary syndrome     Depression with anxiety     Unstable gait     Left knee sprain     Right knee sprain     Bronchitis     Elevated lipase     Hyperlipidemia     Musculoskeletal chest pain     Tenderness of left calf     Abdominal pain     DJD (degenerative joint disease), lumbosacral     Chest wall pain, chronic     Coronary artery disease involving native coronary artery of native heart     Psychophysiological insomnia     Arthralgia of both knees     Frequency of urination     Urinary hesitancy     Nocturia more than twice per night     Mixed hyperlipidemia     Essential hypertension     Elbow sprain

## 2017-11-16 ENCOUNTER — APPOINTMENT (OUTPATIENT)
Dept: GENERAL RADIOLOGY | Age: 57
End: 2017-11-16
Payer: MEDICARE

## 2017-11-16 ENCOUNTER — HOSPITAL ENCOUNTER (OUTPATIENT)
Age: 57
Setting detail: OBSERVATION
Discharge: HOME | End: 2017-11-17
Attending: EMERGENCY MEDICINE | Admitting: EMERGENCY MEDICINE
Payer: MEDICARE

## 2017-11-16 DIAGNOSIS — R07.9 CHEST PAIN, UNSPECIFIED TYPE: Primary | ICD-10-CM

## 2017-11-16 LAB
ABSOLUTE EOS #: 0.12 K/UL (ref 0–0.44)
ABSOLUTE IMMATURE GRANULOCYTE: <0.03 K/UL (ref 0–0.3)
ABSOLUTE LYMPH #: 2.85 K/UL (ref 1.1–3.7)
ABSOLUTE MONO #: 0.8 K/UL (ref 0.1–1.2)
ANION GAP SERPL CALCULATED.3IONS-SCNC: 11 MMOL/L (ref 9–17)
BASOPHILS # BLD: 1 % (ref 0–2)
BASOPHILS ABSOLUTE: 0.04 K/UL (ref 0–0.2)
BUN BLDV-MCNC: 14 MG/DL (ref 6–20)
BUN/CREAT BLD: ABNORMAL (ref 9–20)
CALCIUM SERPL-MCNC: 9.1 MG/DL (ref 8.6–10.4)
CHLORIDE BLD-SCNC: 101 MMOL/L (ref 98–107)
CO2: 29 MMOL/L (ref 20–31)
CREAT SERPL-MCNC: 0.94 MG/DL (ref 0.7–1.2)
D-DIMER QUANTITATIVE: 0.31 MG/L FEU
DIFFERENTIAL TYPE: NORMAL
EOSINOPHILS RELATIVE PERCENT: 2 % (ref 1–4)
GFR AFRICAN AMERICAN: >60 ML/MIN
GFR NON-AFRICAN AMERICAN: >60 ML/MIN
GFR SERPL CREATININE-BSD FRML MDRD: ABNORMAL ML/MIN/{1.73_M2}
GFR SERPL CREATININE-BSD FRML MDRD: ABNORMAL ML/MIN/{1.73_M2}
GLUCOSE BLD-MCNC: 98 MG/DL (ref 70–99)
HCT VFR BLD CALC: 40.9 % (ref 40.7–50.3)
HEMOGLOBIN: 13.2 G/DL (ref 13–17)
IMMATURE GRANULOCYTES: 0 %
LYMPHOCYTES # BLD: 40 % (ref 24–43)
MCH RBC QN AUTO: 30.1 PG (ref 25.2–33.5)
MCHC RBC AUTO-ENTMCNC: 32.3 G/DL (ref 28.4–34.8)
MCV RBC AUTO: 93.2 FL (ref 82.6–102.9)
MONOCYTES # BLD: 11 % (ref 3–12)
PDW BLD-RTO: 12.2 % (ref 11.8–14.4)
PLATELET # BLD: 216 K/UL (ref 138–453)
PLATELET ESTIMATE: NORMAL
PMV BLD AUTO: 10.4 FL (ref 8.1–13.5)
POC TROPONIN I: 0 NG/ML (ref 0–0.1)
POC TROPONIN I: 0 NG/ML (ref 0–0.1)
POC TROPONIN INTERP: NORMAL
POC TROPONIN INTERP: NORMAL
POTASSIUM SERPL-SCNC: 3.5 MMOL/L (ref 3.7–5.3)
RBC # BLD: 4.39 M/UL (ref 4.21–5.77)
RBC # BLD: NORMAL 10*6/UL
SEG NEUTROPHILS: 46 % (ref 36–65)
SEGMENTED NEUTROPHILS ABSOLUTE COUNT: 3.36 K/UL (ref 1.5–8.1)
SODIUM BLD-SCNC: 141 MMOL/L (ref 135–144)
WBC # BLD: 7.2 K/UL (ref 3.5–11.3)
WBC # BLD: NORMAL 10*3/UL

## 2017-11-16 PROCEDURE — G0378 HOSPITAL OBSERVATION PER HR: HCPCS

## 2017-11-16 PROCEDURE — 85025 COMPLETE CBC W/AUTO DIFF WBC: CPT

## 2017-11-16 PROCEDURE — 80048 BASIC METABOLIC PNL TOTAL CA: CPT

## 2017-11-16 PROCEDURE — 6370000000 HC RX 637 (ALT 250 FOR IP): Performed by: STUDENT IN AN ORGANIZED HEALTH CARE EDUCATION/TRAINING PROGRAM

## 2017-11-16 PROCEDURE — 6360000002 HC RX W HCPCS: Performed by: STUDENT IN AN ORGANIZED HEALTH CARE EDUCATION/TRAINING PROGRAM

## 2017-11-16 PROCEDURE — 2580000003 HC RX 258: Performed by: STUDENT IN AN ORGANIZED HEALTH CARE EDUCATION/TRAINING PROGRAM

## 2017-11-16 PROCEDURE — 85379 FIBRIN DEGRADATION QUANT: CPT

## 2017-11-16 PROCEDURE — 84484 ASSAY OF TROPONIN QUANT: CPT

## 2017-11-16 PROCEDURE — 93005 ELECTROCARDIOGRAM TRACING: CPT

## 2017-11-16 PROCEDURE — 96375 TX/PRO/DX INJ NEW DRUG ADDON: CPT

## 2017-11-16 PROCEDURE — 96374 THER/PROPH/DIAG INJ IV PUSH: CPT

## 2017-11-16 PROCEDURE — 6370000000 HC RX 637 (ALT 250 FOR IP): Performed by: EMERGENCY MEDICINE

## 2017-11-16 PROCEDURE — 71020 XR CHEST STANDARD TWO VW: CPT

## 2017-11-16 PROCEDURE — 99285 EMERGENCY DEPT VISIT HI MDM: CPT

## 2017-11-16 PROCEDURE — 6360000002 HC RX W HCPCS: Performed by: EMERGENCY MEDICINE

## 2017-11-16 RX ORDER — SODIUM CHLORIDE 0.9 % (FLUSH) 0.9 %
10 SYRINGE (ML) INJECTION PRN
Status: DISCONTINUED | OUTPATIENT
Start: 2017-11-16 | End: 2017-11-17 | Stop reason: HOSPADM

## 2017-11-16 RX ORDER — TRAZODONE HYDROCHLORIDE 50 MG/1
50 TABLET ORAL NIGHTLY
Status: DISCONTINUED | OUTPATIENT
Start: 2017-11-16 | End: 2017-11-17 | Stop reason: HOSPADM

## 2017-11-16 RX ORDER — OXYCODONE HYDROCHLORIDE AND ACETAMINOPHEN 5; 325 MG/1; MG/1
1 TABLET ORAL EVERY 4 HOURS PRN
Status: DISCONTINUED | OUTPATIENT
Start: 2017-11-16 | End: 2017-11-17 | Stop reason: HOSPADM

## 2017-11-16 RX ORDER — ONDANSETRON 2 MG/ML
4 INJECTION INTRAMUSCULAR; INTRAVENOUS ONCE
Status: COMPLETED | OUTPATIENT
Start: 2017-11-16 | End: 2017-11-16

## 2017-11-16 RX ORDER — ONDANSETRON 2 MG/ML
4 INJECTION INTRAMUSCULAR; INTRAVENOUS EVERY 8 HOURS PRN
Status: DISCONTINUED | OUTPATIENT
Start: 2017-11-16 | End: 2017-11-17 | Stop reason: HOSPADM

## 2017-11-16 RX ORDER — ASPIRIN 81 MG/1
81 TABLET ORAL DAILY
Status: DISCONTINUED | OUTPATIENT
Start: 2017-11-16 | End: 2017-11-17 | Stop reason: HOSPADM

## 2017-11-16 RX ORDER — SODIUM CHLORIDE 0.9 % (FLUSH) 0.9 %
10 SYRINGE (ML) INJECTION EVERY 12 HOURS SCHEDULED
Status: DISCONTINUED | OUTPATIENT
Start: 2017-11-16 | End: 2017-11-17 | Stop reason: HOSPADM

## 2017-11-16 RX ORDER — ASPIRIN 81 MG/1
324 TABLET, CHEWABLE ORAL ONCE
Status: COMPLETED | OUTPATIENT
Start: 2017-11-16 | End: 2017-11-16

## 2017-11-16 RX ORDER — MORPHINE SULFATE 10 MG/ML
4 INJECTION, SOLUTION INTRAMUSCULAR; INTRAVENOUS ONCE
Status: COMPLETED | OUTPATIENT
Start: 2017-11-16 | End: 2017-11-16

## 2017-11-16 RX ORDER — CLOPIDOGREL BISULFATE 75 MG/1
75 TABLET ORAL DAILY
Status: DISCONTINUED | OUTPATIENT
Start: 2017-11-16 | End: 2017-11-17 | Stop reason: HOSPADM

## 2017-11-16 RX ORDER — ALPRAZOLAM 0.25 MG/1
0.25 TABLET ORAL NIGHTLY PRN
Status: DISCONTINUED | OUTPATIENT
Start: 2017-11-16 | End: 2017-11-17 | Stop reason: HOSPADM

## 2017-11-16 RX ORDER — PROMETHAZINE HYDROCHLORIDE 25 MG/ML
12.5 INJECTION, SOLUTION INTRAMUSCULAR; INTRAVENOUS ONCE
Status: COMPLETED | OUTPATIENT
Start: 2017-11-16 | End: 2017-11-16

## 2017-11-16 RX ORDER — ACETAMINOPHEN 325 MG/1
650 TABLET ORAL EVERY 4 HOURS PRN
Status: DISCONTINUED | OUTPATIENT
Start: 2017-11-16 | End: 2017-11-17 | Stop reason: HOSPADM

## 2017-11-16 RX ORDER — ROPINIROLE 1 MG/1
1 TABLET, FILM COATED ORAL NIGHTLY
Status: DISCONTINUED | OUTPATIENT
Start: 2017-11-16 | End: 2017-11-17 | Stop reason: HOSPADM

## 2017-11-16 RX ORDER — ACETAMINOPHEN 325 MG/1
650 TABLET ORAL ONCE
Status: COMPLETED | OUTPATIENT
Start: 2017-11-16 | End: 2017-11-16

## 2017-11-16 RX ORDER — ATORVASTATIN CALCIUM 40 MG/1
40 TABLET, FILM COATED ORAL DAILY
Status: DISCONTINUED | OUTPATIENT
Start: 2017-11-16 | End: 2017-11-17 | Stop reason: HOSPADM

## 2017-11-16 RX ADMIN — ACETAMINOPHEN 650 MG: 325 TABLET ORAL at 13:43

## 2017-11-16 RX ADMIN — PROMETHAZINE HYDROCHLORIDE 12.5 MG: 25 INJECTION INTRAMUSCULAR; INTRAVENOUS at 16:31

## 2017-11-16 RX ADMIN — ONDANSETRON 4 MG: 2 INJECTION, SOLUTION INTRAMUSCULAR; INTRAVENOUS at 11:08

## 2017-11-16 RX ADMIN — MORPHINE SULFATE 4 MG: 10 INJECTION, SOLUTION INTRAMUSCULAR; INTRAVENOUS at 11:08

## 2017-11-16 RX ADMIN — ATORVASTATIN CALCIUM 40 MG: 40 TABLET, FILM COATED ORAL at 18:26

## 2017-11-16 RX ADMIN — ASPIRIN 81 MG 324 MG: 81 TABLET ORAL at 11:07

## 2017-11-16 RX ADMIN — OXYCODONE HYDROCHLORIDE AND ACETAMINOPHEN 1 TABLET: 5; 325 TABLET ORAL at 23:09

## 2017-11-16 RX ADMIN — Medication 10 ML: at 21:00

## 2017-11-16 RX ADMIN — OXYCODONE HYDROCHLORIDE AND ACETAMINOPHEN 1 TABLET: 5; 325 TABLET ORAL at 16:31

## 2017-11-16 ASSESSMENT — PAIN DESCRIPTION - PROGRESSION
CLINICAL_PROGRESSION: NOT CHANGED
CLINICAL_PROGRESSION: NOT CHANGED
CLINICAL_PROGRESSION: GRADUALLY WORSENING
CLINICAL_PROGRESSION: GRADUALLY WORSENING
CLINICAL_PROGRESSION: NOT CHANGED
CLINICAL_PROGRESSION: NOT CHANGED
CLINICAL_PROGRESSION: GRADUALLY IMPROVING

## 2017-11-16 ASSESSMENT — ENCOUNTER SYMPTOMS
ABDOMINAL PAIN: 0
WHEEZING: 0
COUGH: 1
VOMITING: 0
SHORTNESS OF BREATH: 1
DIARRHEA: 0
STRIDOR: 0
NAUSEA: 0
CONSTIPATION: 0

## 2017-11-16 ASSESSMENT — PAIN DESCRIPTION - PAIN TYPE
TYPE: ACUTE PAIN
TYPE: ACUTE PAIN

## 2017-11-16 ASSESSMENT — PAIN DESCRIPTION - LOCATION
LOCATION: CHEST

## 2017-11-16 ASSESSMENT — PAIN SCALES - GENERAL
PAINLEVEL_OUTOF10: 9
PAINLEVEL_OUTOF10: 8
PAINLEVEL_OUTOF10: 9
PAINLEVEL_OUTOF10: 5
PAINLEVEL_OUTOF10: 3
PAINLEVEL_OUTOF10: 6
PAINLEVEL_OUTOF10: 3
PAINLEVEL_OUTOF10: 0
PAINLEVEL_OUTOF10: 7
PAINLEVEL_OUTOF10: 3
PAINLEVEL_OUTOF10: 8

## 2017-11-16 ASSESSMENT — PAIN DESCRIPTION - DESCRIPTORS
DESCRIPTORS: ACHING;SHARP;SHOOTING
DESCRIPTORS: TIGHTNESS
DESCRIPTORS: STABBING

## 2017-11-16 ASSESSMENT — PAIN DESCRIPTION - ORIENTATION
ORIENTATION: LEFT
ORIENTATION: MID
ORIENTATION: LEFT

## 2017-11-16 ASSESSMENT — PAIN DESCRIPTION - FREQUENCY: FREQUENCY: INTERMITTENT

## 2017-11-16 NOTE — ED PROVIDER NOTES
Quit date: 10/24/1999    Smokeless tobacco: Never Used    Alcohol use No    Drug use: No    Sexual activity: Not on file     Other Topics Concern    Not on file     Social History Narrative    No narrative on file       Family History   Problem Relation Age of Onset    Diabetes Mother     Hypertension Mother     Heart Disease Father     Cancer Father        Allergies:  Review of patient's allergies indicates no known allergies. Home Medications:  Prior to Admission medications    Medication Sig Start Date End Date Taking? Authorizing Provider   ALPRAZolam (XANAX) 0.25 MG tablet TAKE 1 TABLET BY MOUTH EVERY NIGHT AS NEEDED FOR SLEEP 11/13/17  Yes Ernesto Seo MD   traZODone (DESYREL) 50 MG tablet Take 1 tablet by mouth nightly 11/1/17  Yes Ernesto Seo MD   oxyCODONE-acetaminophen (PERCOCET)  MG per tablet Take 1 tablet by mouth 3 times daily  [facility]  With  [facility]. 7/12/17  Yes Historical Provider, MD   clopidogrel (PLAVIX) 75 MG tablet Take 1 tablet by mouth daily 6/13/17  Yes Ernesto Seo MD   atorvastatin (LIPITOR) 40 MG tablet Take 1 tablet by mouth daily 6/13/17  Yes Ernesto Seo MD   rOPINIRole (REQUIP) 1 MG tablet TAKE 1 TABLET BY MOUTH EVERY NIGHT 6/13/17  Yes Ernesto Seo MD   nitroGLYCERIN (NITROSTAT) 0.4 MG SL tablet Place 1 tablet under the tongue every 5 minutes as needed for Chest pain up to max of 3 total doses. If no relief after 1 dose, call 911. 10/4/17   Ernesto Seo MD       REVIEW OF SYSTEMS    (2-9 systems for level 4, 10 or more for level 5)      Review of Systems   Constitutional: Positive for chills and fever. Respiratory: Positive for cough and shortness of breath. Negative for wheezing and stridor. Cardiovascular: Positive for chest pain. Negative for palpitations. Gastrointestinal: Negative for abdominal pain, constipation, diarrhea, nausea and vomiting. Genitourinary: Negative for dysuria, frequency and hematuria. Neurological: Positive for numbness. Negative for light-headedness. PHYSICAL EXAM   (up to 7 for level 4, 8 or more for level 5)      INITIAL VITALS:   BP (!) 158/66   Pulse 58   Temp 97 °F (36.1 °C) (Oral)   Resp 16   Ht 5' 5\" (1.651 m)   Wt 128 lb (58.1 kg)   SpO2 99%   BMI 21.30 kg/m²     Physical Exam   Constitutional: He is oriented to person, place, and time. He appears well-developed and well-nourished. No distress. HENT:   Head: Normocephalic and atraumatic. Eyes: Conjunctivae and EOM are normal. Pupils are equal, round, and reactive to light. Cardiovascular: Normal rate, regular rhythm, normal heart sounds and intact distal pulses. Exam reveals no gallop and no friction rub. No murmur heard. Pulmonary/Chest: Effort normal and breath sounds normal. No respiratory distress. He has no wheezes. He has no rales. He exhibits tenderness. Abdominal: Soft. Bowel sounds are normal. He exhibits no distension. There is no tenderness. There is no rebound. Neurological: He is alert and oriented to person, place, and time. Skin: Skin is warm and dry.        DIFFERENTIAL  DIAGNOSIS     PLAN (LABS / IMAGING / EKG):  Orders Placed This Encounter   Procedures    XR CHEST STANDARD (2 VW)    CBC Auto Differential    BASIC METABOLIC PANEL    D-Dimer, Quantitative    DIET GENERAL;    Vital signs per unit routine    Notify physician    Notify physician    Place intermittent pneumatic compression device    Up with assistance    Full Code    Inpatient consult to Cardiology    POCT troponin    POCT troponin    POCT troponin    EKG 12 Lead    EKG 12 Lead    PATIENT STATUS (FROM ED OR OR/PROCEDURAL) Observation       MEDICATIONS ORDERED:  Orders Placed This Encounter   Medications    morphine (PF) injection 4 mg    aspirin chewable tablet 324 mg    ondansetron (ZOFRAN) injection 4 mg    ALPRAZolam (XANAX) tablet 0.25 mg    aspirin EC tablet 81 mg    atorvastatin (LIPITOR) tablet 40 mL/min    GFR African American >60 >60 mL/min    GFR Comment          GFR Staging NOT REPORTED    D-Dimer, Quantitative   Result Value Ref Range    D-Dimer, Quant 0.31 mg/L FEU   POCT troponin   Result Value Ref Range    POC Troponin I 0.00 0.00 - 0.10 ng/mL    POC Troponin Interp       The Troponin-I (POC) results cannot be compared to the Troponin-T results. POCT troponin   Result Value Ref Range    POC Troponin I 0.00 0.00 - 0.10 ng/mL    POC Troponin Interp       The Troponin-I (POC) results cannot be compared to the Troponin-T results. EKG 12 Lead   Result Value Ref Range    Ventricular Rate 47 BPM    Atrial Rate 47 BPM    P-R Interval 118 ms    QRS Duration 88 ms    Q-T Interval 426 ms    QTc Calculation (Bazett) 377 ms    P Axis 41 degrees    R Axis -12 degrees    T Axis 49 degrees   EKG 12 Lead   Result Value Ref Range    Ventricular Rate 51 BPM    Atrial Rate 51 BPM    P-R Interval 128 ms    QRS Duration 106 ms    Q-T Interval 418 ms    QTc Calculation (Bazett) 385 ms    P Axis 49 degrees    R Axis 23 degrees    T Axis 55 degrees       IMPRESSION: Chest pain    RADIOLOGY:  None    EKG  EKG Interpretation    Interpreted by me    Rhythm: normal sinus   Rate: Bradycardic  Axis: normal  Ectopy: none  Conduction: normal  ST Segments: no acute change  T Waves: no acute change  Q Waves: none    Clinical Impression: no acute changes and normal EKG    All EKG's are interpreted by the Emergency Department Physician who either signs or Co-signs this chart in the absence of a cardiologist.    EMERGENCY DEPARTMENT COURSE:  Bradycardia on repeat EKG, consistent with prior EKG performed in February 2017 and November 2016. Chest x-ray showed no acute process, troponins, CBC, BMP are within normal limits. Due to the patient's extensive cardiac history including CABG and multiple stents, we will admit the patient to the observation unit for further cardiac workup.   Patient has been discussed with cardiology, who stated that they will see the patient in the observation unit. PROCEDURES:  None    CONSULTS:  IP CONSULT TO CARDIOLOGY    CRITICAL CARE:  None    FINAL IMPRESSION      1. Chest pain, unspecified type          DISPOSITION / PLAN     DISPOSITION Admitted    PATIENT REFERRED TO:  Fozia Miller MD  [facility], [address] [facility] [place] [address]  [phone]            DISCHARGE MEDICATIONS:  Current Discharge Medication List          Taya Echols D.O.   Emergency Medicine Resident    (Please note that portions of this note were completed with a voice recognition program.  Efforts were made to edit the dictations but occasionally words are mis-transcribed.)     Taya Echols MD  11/16/17 8482

## 2017-11-16 NOTE — ED PROVIDER NOTES
[facility]  ED  eMERGENCY dEPARTMENT eNCOUnter   Attending Attestation     Pt Name: Ella Perkins  MRN: [id number]  [place] 1960  Date of evaluation: 11/16/17       Ella Perkins is a 59 y.o. male who presents with Chest Pain and Shortness of Breath      History: Patient is with chest pain and shortness of breath. Patient said it started Today. Patient admits to having multiple stents and several bypass surgeries. Patient says that he is having pain in the heart on the left side of his chest.  When asked how he knows that the pain is in his heart he says \"because I know\". Pt is requesting pain meds and nausea meds. Exam: Heart sounds are normal rate and rhythm no murmurs rubs or gallops, lungs are clear to auscultation bilaterally, abdomen is soft nontender, patient moves all extremities without difficulty. EKG Interpretation    Interpreted by emergency department physician    Rhythm: Bradycardia 53 bpm.  Rate: 53 bpm  Axis: normal  Ectopy: none  Conduction: normal  ST Segments: no acute change  T Waves: no acute change  Q Waves: none    Clinical Impression: non-specific EKG      Tori Hanna M.D. Will get cardiac workup and plan for admission for cardiology evlaluation/possible stress test based on significant history. I performed a history and physical examination of the patient and discussed management with the resident. I reviewed the residents note and agree with the documented findings and plan of care. Any areas of disagreement are noted on the chart. I was personally present for the key portions of any procedures. I have documented in the chart those procedures where I was not present during the key portions. I have personally reviewed all images and agree with the resident's interpretation. I have reviewed the emergency nurses triage note.  I agree with the chief complaint, past medical history, past surgical history, allergies, medications, social and family history as documented unless otherwise noted below. Documentation of the HPI, Physical Exam and Medical Decision Making performed by medical students or scribes is based on my personal performance of the HPI, PE and MDM. For Phys Assistant/ Nurse Practitioner cases/documentation I have had a face to face evaluation of this patient and have completed at least one if not all key elements of the E/M (history, physical exam, and MDM). Additional findings are as noted. For APC cases I have personally evaluated and examined the patient in conjunction with the APC and agree with the treatment plan and disposition of the patient as recorded by the APC.     Salo Davison MD  Attending Emergency  Physician        Yakelin Coyle MD  11/16/17 [id number]

## 2017-11-16 NOTE — CARE COORDINATION
transport home. Discharge Plan: return to home, no skilled needs identified.         Electronically signed by Camilla Huynh RN on 11/16/17 at 1:39 PM

## 2017-11-16 NOTE — ED NOTES
Pt to ED with c/o left sided chest pain with SOB and left arm numbness that started this morning around 0600. Pt states he has had 10 stents and 3 cabgs. Pt is alert and oriented x4. NAD noted at this time. Vitals stable other then hypertensive. Resident at bedside to evaluate.  Will continue to monitor      Clayton Alaniz RN  11/16/17 4253

## 2017-11-17 VITALS
SYSTOLIC BLOOD PRESSURE: 131 MMHG | BODY MASS INDEX: 21.33 KG/M2 | HEIGHT: 65 IN | DIASTOLIC BLOOD PRESSURE: 80 MMHG | OXYGEN SATURATION: 100 % | RESPIRATION RATE: 16 BRPM | TEMPERATURE: 97.9 F | HEART RATE: 73 BPM | WEIGHT: 128 LBS

## 2017-11-17 LAB
ANION GAP SERPL CALCULATED.3IONS-SCNC: 12 MMOL/L (ref 9–17)
BUN BLDV-MCNC: 16 MG/DL (ref 6–20)
BUN/CREAT BLD: NORMAL (ref 9–20)
CALCIUM SERPL-MCNC: 8.9 MG/DL (ref 8.6–10.4)
CHLORIDE BLD-SCNC: 105 MMOL/L (ref 98–107)
CO2: 25 MMOL/L (ref 20–31)
CREAT SERPL-MCNC: 0.87 MG/DL (ref 0.7–1.2)
EKG ATRIAL RATE: 47 BPM
EKG ATRIAL RATE: 51 BPM
EKG ATRIAL RATE: 55 BPM
EKG P AXIS: 41 DEGREES
EKG P AXIS: 47 DEGREES
EKG P AXIS: 49 DEGREES
EKG P-R INTERVAL: 118 MS
EKG P-R INTERVAL: 128 MS
EKG P-R INTERVAL: 128 MS
EKG Q-T INTERVAL: 418 MS
EKG Q-T INTERVAL: 426 MS
EKG Q-T INTERVAL: 426 MS
EKG QRS DURATION: 106 MS
EKG QRS DURATION: 110 MS
EKG QRS DURATION: 88 MS
EKG QTC CALCULATION (BAZETT): 377 MS
EKG QTC CALCULATION (BAZETT): 385 MS
EKG QTC CALCULATION (BAZETT): 407 MS
EKG R AXIS: -12 DEGREES
EKG R AXIS: 23 DEGREES
EKG R AXIS: 28 DEGREES
EKG T AXIS: 49 DEGREES
EKG T AXIS: 55 DEGREES
EKG T AXIS: 56 DEGREES
EKG VENTRICULAR RATE: 47 BPM
EKG VENTRICULAR RATE: 51 BPM
EKG VENTRICULAR RATE: 55 BPM
GFR AFRICAN AMERICAN: >60 ML/MIN
GFR NON-AFRICAN AMERICAN: >60 ML/MIN
GFR SERPL CREATININE-BSD FRML MDRD: NORMAL ML/MIN/{1.73_M2}
GFR SERPL CREATININE-BSD FRML MDRD: NORMAL ML/MIN/{1.73_M2}
GLUCOSE BLD-MCNC: 99 MG/DL (ref 70–99)
POTASSIUM SERPL-SCNC: 4.4 MMOL/L (ref 3.7–5.3)
SODIUM BLD-SCNC: 142 MMOL/L (ref 135–144)
TSH SERPL DL<=0.05 MIU/L-ACNC: 1.9 MIU/L (ref 0.3–5)

## 2017-11-17 PROCEDURE — 36415 COLL VENOUS BLD VENIPUNCTURE: CPT

## 2017-11-17 PROCEDURE — G0378 HOSPITAL OBSERVATION PER HR: HCPCS

## 2017-11-17 PROCEDURE — 80048 BASIC METABOLIC PNL TOTAL CA: CPT

## 2017-11-17 PROCEDURE — 84443 ASSAY THYROID STIM HORMONE: CPT

## 2017-11-17 PROCEDURE — 6370000000 HC RX 637 (ALT 250 FOR IP): Performed by: STUDENT IN AN ORGANIZED HEALTH CARE EDUCATION/TRAINING PROGRAM

## 2017-11-17 PROCEDURE — 93005 ELECTROCARDIOGRAM TRACING: CPT

## 2017-11-17 PROCEDURE — 2580000003 HC RX 258: Performed by: STUDENT IN AN ORGANIZED HEALTH CARE EDUCATION/TRAINING PROGRAM

## 2017-11-17 RX ORDER — PSEUDOEPHEDRINE HCL 60 MG/1
60 TABLET ORAL ONCE
Status: COMPLETED | OUTPATIENT
Start: 2017-11-17 | End: 2017-11-17

## 2017-11-17 RX ORDER — BENZONATATE 100 MG/1
100 CAPSULE ORAL 3 TIMES DAILY PRN
Status: DISCONTINUED | OUTPATIENT
Start: 2017-11-17 | End: 2017-11-17 | Stop reason: HOSPADM

## 2017-11-17 RX ORDER — BENZONATATE 100 MG/1
100 CAPSULE ORAL 3 TIMES DAILY PRN
Qty: 21 CAPSULE | Refills: 0 | Status: SHIPPED | OUTPATIENT
Start: 2017-11-17 | End: 2017-11-24

## 2017-11-17 RX ADMIN — PSEUDOEPHEDRINE HYDROCHLORIDE 60 MG: 60 TABLET, FILM COATED ORAL at 17:47

## 2017-11-17 RX ADMIN — OXYCODONE HYDROCHLORIDE AND ACETAMINOPHEN 1 TABLET: 5; 325 TABLET ORAL at 17:47

## 2017-11-17 RX ADMIN — OXYCODONE HYDROCHLORIDE AND ACETAMINOPHEN 1 TABLET: 5; 325 TABLET ORAL at 13:21

## 2017-11-17 RX ADMIN — BENZONATATE 100 MG: 100 CAPSULE ORAL at 01:24

## 2017-11-17 RX ADMIN — Medication 10 ML: at 08:00

## 2017-11-17 RX ADMIN — ASPIRIN 81 MG: 81 TABLET, COATED ORAL at 07:56

## 2017-11-17 RX ADMIN — OXYCODONE HYDROCHLORIDE AND ACETAMINOPHEN 1 TABLET: 5; 325 TABLET ORAL at 07:59

## 2017-11-17 RX ADMIN — ATORVASTATIN CALCIUM 40 MG: 40 TABLET, FILM COATED ORAL at 17:50

## 2017-11-17 RX ADMIN — CLOPIDOGREL 75 MG: 75 TABLET, FILM COATED ORAL at 07:56

## 2017-11-17 ASSESSMENT — PAIN DESCRIPTION - LOCATION
LOCATION: CHEST

## 2017-11-17 ASSESSMENT — PAIN DESCRIPTION - FREQUENCY
FREQUENCY: INTERMITTENT
FREQUENCY: INTERMITTENT

## 2017-11-17 ASSESSMENT — ENCOUNTER SYMPTOMS
VOICE CHANGE: 0
SHORTNESS OF BREATH: 1
CHOKING: 0
SINUS PAIN: 0
SINUS PRESSURE: 0
COUGH: 0
PHOTOPHOBIA: 0
SORE THROAT: 0
CHEST TIGHTNESS: 0
WHEEZING: 0
NAUSEA: 0
VOMITING: 0
COLOR CHANGE: 0
CONSTIPATION: 0
BLOOD IN STOOL: 0
ABDOMINAL PAIN: 0
DIARRHEA: 0
ABDOMINAL DISTENTION: 0
TROUBLE SWALLOWING: 0

## 2017-11-17 ASSESSMENT — PAIN SCALES - GENERAL
PAINLEVEL_OUTOF10: 0
PAINLEVEL_OUTOF10: 3
PAINLEVEL_OUTOF10: 0
PAINLEVEL_OUTOF10: 6
PAINLEVEL_OUTOF10: 0
PAINLEVEL_OUTOF10: 7
PAINLEVEL_OUTOF10: 7
PAINLEVEL_OUTOF10: 0
PAINLEVEL_OUTOF10: 6
PAINLEVEL_OUTOF10: 0

## 2017-11-17 ASSESSMENT — PAIN DESCRIPTION - PROGRESSION
CLINICAL_PROGRESSION: NOT CHANGED

## 2017-11-17 ASSESSMENT — PAIN DESCRIPTION - ORIENTATION
ORIENTATION: LEFT;MID

## 2017-11-17 ASSESSMENT — PAIN DESCRIPTION - DESCRIPTORS: DESCRIPTORS: STABBING

## 2017-11-17 NOTE — H&P
arm  Severity:  6/10  Timing/Duration: 2 days  Modifying Factors: none    REVIEW OF SYSTEMS       Review of Systems   Constitutional: Negative for activity change, appetite change, fatigue and fever. HENT: Negative for congestion, sinus pain, sinus pressure, sneezing, sore throat, trouble swallowing and voice change. Eyes: Negative for photophobia and visual disturbance. Respiratory: Positive for shortness of breath. Negative for cough, choking, chest tightness and wheezing. Cardiovascular: Positive for chest pain. Negative for palpitations. Gastrointestinal: Negative for abdominal distention, abdominal pain, blood in stool, constipation, diarrhea, nausea and vomiting. Endocrine: Negative for polydipsia, polyphagia and polyuria. Genitourinary: Negative for difficulty urinating, dysuria, flank pain, frequency and urgency. Musculoskeletal: Negative for arthralgias, gait problem, joint swelling, myalgias and neck stiffness. Skin: Negative for color change and pallor. Neurological: Negative for dizziness, syncope, facial asymmetry, weakness, light-headedness, numbness and headaches. (PQRS) Advance directives on face sheet per hospital policy. No change unless specifically mentioned in chart    PAST MEDICAL HISTORY    has a past medical history of Anxiety; CAD (coronary artery disease); Chronic back pain; Congenital heart disease; Depression; Headache(784.0); Hx of heart artery stent; Hyperlipidemia; Hypertension; LONG TERM ANTICOAGULENT USE; MI (myocardial infarction); Osteoarthritis; Radicular syndrome of left leg; Restless legs syndrome; S/P CABG x 4; and Shingles outbreak. I have reviewed the past medical history with the patient and it is pertinent to this complaint. SURGICAL HISTORY      has a past surgical history that includes Appendectomy; Coronary artery bypass graft (2008, 2009); Colonoscopy;  Upper gastrointestinal endoscopy; Cardiac catheterization; and Cardiac left-sided chest pain, shortness of breath. Patient also stated that at the time of the chest pain he began experiencing a sensation of electrical shocks moving down his left arm. As of this morning, the pain has greatly improved and the left arm discomfort has completely disappeared. Patient states he feels considerably better. He will see [facility] this afternoon with a probable discharge pending their recommendations. Primary Complaints: Acute, sharp, left-sided chest pain associated with shortness of breath and left arm weakness from an unknown etiology but likely related to his extensive history of coronary artery disease. The patient's pain has improved with rest and analgesia. He will follow as an outpatient with his PCP and cardiologist.      · Await cardiology recommendations  · Continue home medications and pain control  · Monitor vitals, labs, and imaging  · DISPO: pending consults and clinical improvement    CONSULTS:    IP CONSULT TO CARDIOLOGY    PROCEDURES:  Not indicated    PATIENT REFERRED TO:    Marcin Lowry MD  [facility], [address]          --  Mylene Heard MD   Emergency Medicine Resident     This dictation was generated by voice recognition computer software. Although all attempts are made to edit the dictation for accuracy, there may be errors in the transcription that are not intended.

## 2017-11-17 NOTE — FLOWSHEET NOTE
Patient in bed in discomfort. He told me had had about 10 stints put in through the years and  once but came back to life. He said that event changed his life and led him to [facility].  He talked about some tragic deaths in his family but how God had comforted him through them. He said he had a big family supporting him and also his Gnosticism although he did not name it. I prayed with him and he thanked me for the visit. 17 1740   Encounter Summary   Services provided to: Patient   Referral/Consult From: [facility] Family members   Place of [address] [place] [address] No   Continue Visiting (17)   Complexity of Encounter Moderate   Length of Encounter 30 minutes   Spiritual Assessment Completed Yes   Routine   Type Initial   Spiritual/Buddhism   Type Spiritual support   Assessment Calm; Approachable   Intervention Active listening;Explored feelings, thoughts, concerns;Nurtured hope;Prayer   Outcome Expressed gratitude

## 2017-11-17 NOTE — PROGRESS NOTES
 clopidogrel  75 mg Oral Daily    rOPINIRole  1 mg Oral Nightly    traZODone  50 mg Oral Nightly    sodium chloride flush  10 mL Intravenous 2 times per day     Continuous Infusions:           ASSESSMENT     Active Problems:    Chest pain      PLAN       probably non cardiac chest pain   Will follow up with Dr Cherelle Ford in next 7-10 days    Electronically signed by Kailash Jarquin MD on 11/17/2017 at 6:23 PM

## 2017-11-18 NOTE — DISCHARGE SUMMARY
CDU Discharge Summary        Patient:  Haleigh Arellano  YOB: 1960    MRN: [id number]   Acct: [de-identified]    Primary Care Physician: Lakhwinder Fajardo MD     Admit date:  11/16/2017 11/16/2017  9:30 AM  Discharge date:  11/17/2017 11/17/2017  6:53 PM     Discharge Diagnoses:     Primary Complaints: Acute, sharp, left-sided chest pain associated with shortness of breath and left arm weakness from an unknown etiology but likely related to his extensive history of coronary artery disease. The patient's pain has improved with rest and analgesia. He will follow as an outpatient with his PCP and cardiologist.      Discharge Medications: Coretha Rocío   Home Medication Instructions [id number]:[id number]    Printed on:11/18/17 5458   Medication Information                      ALPRAZolam (XANAX) 0.25 MG tablet  TAKE 1 TABLET BY MOUTH EVERY NIGHT AS NEEDED FOR SLEEP             atorvastatin (LIPITOR) 40 MG tablet  Take 1 tablet by mouth daily             benzonatate (TESSALON) 100 MG capsule  Take 1 capsule by mouth 3 times daily as needed for Cough             clopidogrel (PLAVIX) 75 MG tablet  Take 1 tablet by mouth daily             nitroGLYCERIN (NITROSTAT) 0.4 MG SL tablet  Place 1 tablet under the tongue every 5 minutes as needed for Chest pain up to max of 3 total doses. If no relief after 1 dose, call 911. oxyCODONE-acetaminophen (PERCOCET)  MG per tablet  Take 1 tablet by mouth 3 times daily  [facility]  With  [facility]              rOPINIRole (REQUIP) 1 MG tablet  TAKE 1 TABLET BY MOUTH EVERY NIGHT             traZODone (DESYREL) 50 MG tablet  Take 1 tablet by mouth nightly                 Diet:        Activity:  As tolerated    Follow-up:  Call today/tomorrow for a follow up appointment with Lakhwinder Fajardo MD     Consultants: IP CONSULT TO CARDIOLOGY    Procedures: not indicated         Physical Exam:    Vitals:  Vitals:    11/16/17 1416 11/16/17 1553 11/16/17 1925 11/17/17 discharge home. I have discussed plan of care with patient and they are in understanding. They were instructed to read discharge paperwork. All of their questions and concerns were addressed. Patient states that they understand the plan and agree with the plan.     Time Spent: 0      Dayne Sandoval MD  Emergency Medicine Resident Physician

## 2017-11-20 LAB
EKG ATRIAL RATE: 53 BPM
EKG P AXIS: 52 DEGREES
EKG P-R INTERVAL: 132 MS
EKG Q-T INTERVAL: 414 MS
EKG QRS DURATION: 106 MS
EKG QTC CALCULATION (BAZETT): 388 MS
EKG R AXIS: 19 DEGREES
EKG T AXIS: 51 DEGREES
EKG VENTRICULAR RATE: 53 BPM

## 2018-01-23 ENCOUNTER — OFFICE VISIT (OUTPATIENT)
Dept: FAMILY MEDICINE CLINIC | Age: 58
End: 2018-01-23
Payer: MEDICARE

## 2018-01-23 VITALS
BODY MASS INDEX: 22.89 KG/M2 | WEIGHT: 137.4 LBS | HEIGHT: 65 IN | SYSTOLIC BLOOD PRESSURE: 114 MMHG | HEART RATE: 65 BPM | DIASTOLIC BLOOD PRESSURE: 70 MMHG

## 2018-01-23 DIAGNOSIS — I10 ESSENTIAL HYPERTENSION: ICD-10-CM

## 2018-01-23 DIAGNOSIS — M47.817 DJD (DEGENERATIVE JOINT DISEASE), LUMBOSACRAL: ICD-10-CM

## 2018-01-23 DIAGNOSIS — E78.2 MIXED HYPERLIPIDEMIA: ICD-10-CM

## 2018-01-23 DIAGNOSIS — F11.93 OPIOID WITHDRAWAL (HCC): ICD-10-CM

## 2018-01-23 DIAGNOSIS — M17.0 PRIMARY OSTEOARTHRITIS OF BOTH KNEES: ICD-10-CM

## 2018-01-23 DIAGNOSIS — I25.10 CORONARY ARTERY DISEASE INVOLVING NATIVE CORONARY ARTERY OF NATIVE HEART, ANGINA PRESENCE UNSPECIFIED: ICD-10-CM

## 2018-01-23 DIAGNOSIS — F41.8 DEPRESSION WITH ANXIETY: ICD-10-CM

## 2018-01-23 DIAGNOSIS — M54.10 RADICULAR SYNDROME OF LEFT LEG: ICD-10-CM

## 2018-01-23 DIAGNOSIS — K21.9 GASTROESOPHAGEAL REFLUX DISEASE, ESOPHAGITIS PRESENCE NOT SPECIFIED: ICD-10-CM

## 2018-01-23 DIAGNOSIS — I25.810 ATHEROSCLEROSIS OF CORONARY ARTERY BYPASS GRAFT OF NATIVE HEART WITHOUT ANGINA PECTORIS: ICD-10-CM

## 2018-01-23 DIAGNOSIS — R10.13 EPIGASTRIC PAIN: Primary | ICD-10-CM

## 2018-01-23 DIAGNOSIS — Z00.00 HEALTHCARE MAINTENANCE: ICD-10-CM

## 2018-01-23 DIAGNOSIS — F33.41 MAJOR DEPRESSIVE DISORDER, RECURRENT, IN PARTIAL REMISSION (HCC): ICD-10-CM

## 2018-01-23 DIAGNOSIS — R26.81 UNSTABLE GAIT: ICD-10-CM

## 2018-01-23 DIAGNOSIS — F51.04 PSYCHOPHYSIOLOGICAL INSOMNIA: ICD-10-CM

## 2018-01-23 PROBLEM — E11.9 DIABETES MELLITUS (HCC): Status: ACTIVE | Noted: 2018-01-23

## 2018-01-23 PROBLEM — I30.0 ACUTE NONSPECIFIC IDIOPATHIC PERICARDITIS: Status: ACTIVE | Noted: 2018-01-23

## 2018-01-23 LAB — HBA1C MFR BLD: 5.7 %

## 2018-01-23 PROCEDURE — G8484 FLU IMMUNIZE NO ADMIN: HCPCS | Performed by: FAMILY MEDICINE

## 2018-01-23 PROCEDURE — G8598 ASA/ANTIPLAT THER USED: HCPCS | Performed by: FAMILY MEDICINE

## 2018-01-23 PROCEDURE — G8420 CALC BMI NORM PARAMETERS: HCPCS | Performed by: FAMILY MEDICINE

## 2018-01-23 PROCEDURE — 3017F COLORECTAL CA SCREEN DOC REV: CPT | Performed by: FAMILY MEDICINE

## 2018-01-23 PROCEDURE — 1036F TOBACCO NON-USER: CPT | Performed by: FAMILY MEDICINE

## 2018-01-23 PROCEDURE — 99214 OFFICE O/P EST MOD 30 MIN: CPT | Performed by: FAMILY MEDICINE

## 2018-01-23 PROCEDURE — 83036 HEMOGLOBIN GLYCOSYLATED A1C: CPT | Performed by: FAMILY MEDICINE

## 2018-01-23 PROCEDURE — G8427 DOCREV CUR MEDS BY ELIG CLIN: HCPCS | Performed by: FAMILY MEDICINE

## 2018-01-23 RX ORDER — ASPIRIN 325 MG
TABLET ORAL
COMMUNITY
End: 2018-05-08 | Stop reason: ALTCHOICE

## 2018-01-23 RX ORDER — PREDNISOLONE ACETATE 10 MG/ML
SUSPENSION/ DROPS OPHTHALMIC
COMMUNITY
Start: 2017-03-03 | End: 2018-05-08 | Stop reason: ALTCHOICE

## 2018-01-23 RX ORDER — OMEPRAZOLE 20 MG/1
20 CAPSULE, DELAYED RELEASE ORAL
Qty: 30 CAPSULE | Refills: 1 | Status: SHIPPED | OUTPATIENT
Start: 2018-01-23 | End: 2018-02-18 | Stop reason: SDUPTHER

## 2018-01-23 NOTE — PATIENT INSTRUCTIONS
These can cause stomach upset. Talk to your doctor if you take daily aspirin for another health problem. When should you call for help? Call 911 anytime you think you may need emergency care. For example, call if:  ? · You passed out (lost consciousness). ? · You pass maroon or very bloody stools. ? · You vomit blood or what looks like coffee grounds. ? · You have new, severe belly pain. ?Call your doctor now or seek immediate medical care if:  ? · Your pain gets worse, especially if it becomes focused in one area of your belly. ? · You have a new or higher fever. ? · Your stools are black and look like tar, or they have streaks of blood. ? · You have unexpected vaginal bleeding. ? · You have symptoms of a urinary tract infection. These may include:  ¨ Pain when you urinate. ¨ Urinating more often than usual.  ¨ Blood in your urine. ? · You are dizzy or lightheaded, or you feel like you may faint. ? Watch closely for changes in your health, and be sure to contact your doctor if:  ? · You are not getting better after 1 day (24 hours). Where can you learn more? Go to https://1Ring.[employer]. org and sign in to your [employer] account. Enter T441 in the statusboom box to learn more about \"Abdominal Pain: Care Instructions. \"     If you do not have an account, please click on the \"Sign Up Now\" link. Current as of: March 20, 2017  Content Version: 11.5  © 9601-8819 [employer]wise, Incorporated. Care instructions adapted under license by [facility] ([facility]). If you have questions about a medical condition or this instruction, always ask your healthcare professional. [place] [address] any warranty or liability for your use of this information.

## 2018-01-26 ASSESSMENT — ENCOUNTER SYMPTOMS
CHEST TIGHTNESS: 0
BACK PAIN: 1
SORE THROAT: 0
RHINORRHEA: 0
CONSTIPATION: 0
VOICE CHANGE: 0
COUGH: 0
SHORTNESS OF BREATH: 1
DIARRHEA: 0
NAUSEA: 1
TROUBLE SWALLOWING: 0
ABDOMINAL PAIN: 1

## 2018-01-26 NOTE — PROGRESS NOTES
nitroGLYCERIN (NITROSTAT) 0.4 MG SL tablet Place 1 tablet under the tongue every 5 minutes as needed for Chest pain up to max of 3 total doses. If no relief after 1 dose, call 911. 25 tablet 1    oxyCODONE-acetaminophen (PERCOCET)  MG per tablet Take 1 tablet by mouth 3 times daily  [facility]  With  [facility].  clopidogrel (PLAVIX) 75 MG tablet Take 1 tablet by mouth daily 90 tablet 3    atorvastatin (LIPITOR) 40 MG tablet Take 1 tablet by mouth daily 90 tablet 3    rOPINIRole (REQUIP) 1 MG tablet TAKE 1 TABLET BY MOUTH EVERY NIGHT 90 tablet 3     No current facility-administered medications for this visit. No Known Allergies      Subjective:      Review of Systems   Constitutional: Negative for chills, diaphoresis, fatigue and fever. HENT: Positive for congestion. Negative for ear pain, rhinorrhea, sore throat, trouble swallowing and voice change. Eyes: Negative for visual disturbance. Respiratory: Positive for shortness of breath. Negative for cough and chest tightness. Cardiovascular: Negative for chest pain, palpitations and leg swelling. Gastrointestinal: Positive for abdominal pain and nausea. Negative for constipation and diarrhea. Endocrine: Negative for polydipsia and polyuria. Genitourinary: Negative for difficulty urinating, dysuria and hematuria. Musculoskeletal: Positive for arthralgias, back pain, gait problem and myalgias. Negative for neck pain. Skin: Negative for rash. Neurological: Negative for dizziness, light-headedness and headaches. Psychiatric/Behavioral: Positive for sleep disturbance. Negative for dysphoric mood. The patient is nervous/anxious. Objective:     Physical Exam   Constitutional: He is oriented to person, place, and time. He appears well-developed and well-nourished. HENT:   Head: Normocephalic. Nose: Mucosal edema present. Nose is congested and wet. Narrow nasal passages. Erythema plus. Throat is clear.    Eyes: Pupils are native heart, angina presence unspecified     5. Atherosclerosis of coronary artery bypass graft of native heart without angina pectoris     6. Depression with anxiety     7. Unstable gait     8. Psychophysiological insomnia     9. Essential hypertension     10. DJD (degenerative joint disease), lumbosacral     11. Mixed hyperlipidemia     12. Primary osteoarthritis of both knees     13. Radicular syndrome of left leg     14. Healthcare maintenance  POCT glycosylated hemoglobin (Hb A1C)   15. Major depressive disorder, recurrent, in partial remission ([facility]         Plan:      Return in about 4 weeks (around 2/20/2018). Orders Placed This Encounter   Procedures    POCT glycosylated hemoglobin (Hb A1C)     Orders Placed This Encounter   Medications    omeprazole (PRILOSEC) 20 MG delayed release capsule     Sig: Take 1 capsule by mouth 2 times daily (before meals) for 60 doses     Dispense:  30 capsule     Refill:  1         Findings and/or pathophysiology discussed with patient. Plan of treatment discussed. Patient's medical problems were discussed with him. Findings were explained. Chart was evaluated. Available lab work and tests results were discussed. His medications were reviewed. Patient's symptoms may be from his opioid withdrawal.  He'll follow-up with his specialists. Prescription for Prilosec was given. If he does not feel better he may need to see GI. Also patient has been advised to resume his appointments with his psychotherapist.  Meanwhile patient was Counseled regarding his medical problems and his anxiety and stress. His chart indicates patient has reduced number of visits to the emergency room. 1.  Shilo received counseling on the following healthy behaviors: nutrition and exercise  2. Patient given educational materials - see patient instructions  3. Was a self-tracking handout given in paper form or via Movinaryt? No  If yes, see orders or list here.   4.  Discussed use, benefit, and side effects of prescribed medications. Barriers to medication compliance addressed. All patient questions answered. Pt voiced understanding. 5.  Reviewed prior labs and health maintenance  6. Continue current medications, diet and exercise. More than 50% of the 25 minutes was spent in counseling patient and evaluating plan of treatment. Completed Refills   Requested Prescriptions     Signed Prescriptions Disp Refills    omeprazole (PRILOSEC) 20 MG delayed release capsule 30 capsule 1     Sig: Take 1 capsule by mouth 2 times daily (before meals) for 60 doses     Electronically signed by Dasia Mayfiedl MD on 1/26/2018 at 10:59 AM    Quality & Risk Score Accuracy - MEDICARE ADVANTAGE    Visit Dx: Major depressive disorder, recurrent, in partial remission ([facility]  Improving based upon review of recent symptoms. Continue current treatment plan and follow up at least yearly. Visit Dx: Opioid withdrawal ([facility]  In remission based upon review of drug use history. Continued abstinence encouraged.   Last edited 01/26/18 10:58 EST by Dasia Mayfield MD

## 2018-02-18 DIAGNOSIS — R10.13 EPIGASTRIC PAIN: ICD-10-CM

## 2018-02-18 DIAGNOSIS — K21.9 GASTROESOPHAGEAL REFLUX DISEASE, ESOPHAGITIS PRESENCE NOT SPECIFIED: ICD-10-CM

## 2018-02-19 RX ORDER — OMEPRAZOLE 20 MG/1
CAPSULE, DELAYED RELEASE ORAL
Qty: 30 CAPSULE | Refills: 0 | Status: SHIPPED | OUTPATIENT
Start: 2018-02-19 | End: 2018-05-08 | Stop reason: ALTCHOICE

## 2018-05-08 ENCOUNTER — OFFICE VISIT (OUTPATIENT)
Dept: FAMILY MEDICINE CLINIC | Age: 58
End: 2018-05-08
Payer: MEDICARE

## 2018-05-08 VITALS
HEIGHT: 65 IN | HEART RATE: 72 BPM | WEIGHT: 145.2 LBS | BODY MASS INDEX: 24.19 KG/M2 | DIASTOLIC BLOOD PRESSURE: 74 MMHG | SYSTOLIC BLOOD PRESSURE: 123 MMHG

## 2018-05-08 DIAGNOSIS — F41.9 ANXIETY: ICD-10-CM

## 2018-05-08 DIAGNOSIS — E78.2 MIXED HYPERLIPIDEMIA: ICD-10-CM

## 2018-05-08 DIAGNOSIS — F51.04 PSYCHOPHYSIOLOGICAL INSOMNIA: ICD-10-CM

## 2018-05-08 DIAGNOSIS — M17.0 PRIMARY OSTEOARTHRITIS OF BOTH KNEES: ICD-10-CM

## 2018-05-08 DIAGNOSIS — I25.10 CORONARY ARTERY DISEASE INVOLVING NATIVE CORONARY ARTERY OF NATIVE HEART, ANGINA PRESENCE UNSPECIFIED: ICD-10-CM

## 2018-05-08 DIAGNOSIS — M54.10 RADICULAR SYNDROME OF LEFT LEG: ICD-10-CM

## 2018-05-08 DIAGNOSIS — I10 ESSENTIAL HYPERTENSION: ICD-10-CM

## 2018-05-08 DIAGNOSIS — F43.0 ANXIETY AS ACUTE REACTION TO EXCEPTIONAL STRESS: ICD-10-CM

## 2018-05-08 DIAGNOSIS — Z12.11 SPECIAL SCREENING FOR MALIGNANT NEOPLASMS, COLON: ICD-10-CM

## 2018-05-08 DIAGNOSIS — H92.02 OTALGIA OF LEFT EAR: ICD-10-CM

## 2018-05-08 DIAGNOSIS — R26.81 UNSTABLE GAIT: ICD-10-CM

## 2018-05-08 DIAGNOSIS — F41.1 ANXIETY AS ACUTE REACTION TO EXCEPTIONAL STRESS: ICD-10-CM

## 2018-05-08 DIAGNOSIS — M47.817 DJD (DEGENERATIVE JOINT DISEASE), LUMBOSACRAL: ICD-10-CM

## 2018-05-08 DIAGNOSIS — R07.89 MUSCULOSKELETAL CHEST PAIN: ICD-10-CM

## 2018-05-08 DIAGNOSIS — F41.8 DEPRESSION WITH ANXIETY: ICD-10-CM

## 2018-05-08 DIAGNOSIS — H60.313 ACUTE DIFFUSE OTITIS EXTERNA OF BOTH EARS: ICD-10-CM

## 2018-05-08 DIAGNOSIS — H91.93 BILATERAL HEARING LOSS, UNSPECIFIED HEARING LOSS TYPE: Primary | ICD-10-CM

## 2018-05-08 PROCEDURE — G8598 ASA/ANTIPLAT THER USED: HCPCS | Performed by: FAMILY MEDICINE

## 2018-05-08 PROCEDURE — G8427 DOCREV CUR MEDS BY ELIG CLIN: HCPCS | Performed by: FAMILY MEDICINE

## 2018-05-08 PROCEDURE — 1036F TOBACCO NON-USER: CPT | Performed by: FAMILY MEDICINE

## 2018-05-08 PROCEDURE — G8420 CALC BMI NORM PARAMETERS: HCPCS | Performed by: FAMILY MEDICINE

## 2018-05-08 PROCEDURE — 99214 OFFICE O/P EST MOD 30 MIN: CPT | Performed by: FAMILY MEDICINE

## 2018-05-08 PROCEDURE — 4130F TOPICAL PREP RX AOE: CPT | Performed by: FAMILY MEDICINE

## 2018-05-08 PROCEDURE — 3017F COLORECTAL CA SCREEN DOC REV: CPT | Performed by: FAMILY MEDICINE

## 2018-05-08 RX ORDER — MELOXICAM 7.5 MG/1
7.5 TABLET ORAL DAILY
Qty: 30 TABLET | Refills: 3 | Status: ON HOLD | OUTPATIENT
Start: 2018-05-08 | End: 2018-10-04 | Stop reason: ALTCHOICE

## 2018-05-08 RX ORDER — ALPRAZOLAM 0.25 MG/1
TABLET ORAL
Qty: 60 TABLET | Refills: 0 | Status: SHIPPED | OUTPATIENT
Start: 2018-05-08 | End: 2018-10-12 | Stop reason: SDUPTHER

## 2018-05-08 ASSESSMENT — ENCOUNTER SYMPTOMS
SORE THROAT: 0
ABDOMINAL PAIN: 0
BACK PAIN: 1
NAUSEA: 0
CHEST TIGHTNESS: 0
TROUBLE SWALLOWING: 0
DIARRHEA: 0
CONSTIPATION: 0
COUGH: 0
RHINORRHEA: 0
SHORTNESS OF BREATH: 0

## 2018-05-23 ENCOUNTER — OFFICE VISIT (OUTPATIENT)
Dept: FAMILY MEDICINE CLINIC | Age: 58
End: 2018-05-23
Payer: MEDICARE

## 2018-05-23 VITALS
WEIGHT: 146.2 LBS | DIASTOLIC BLOOD PRESSURE: 66 MMHG | OXYGEN SATURATION: 96 % | HEART RATE: 65 BPM | HEIGHT: 65 IN | SYSTOLIC BLOOD PRESSURE: 121 MMHG | BODY MASS INDEX: 24.36 KG/M2

## 2018-05-23 DIAGNOSIS — M17.0 PRIMARY OSTEOARTHRITIS OF BOTH KNEES: ICD-10-CM

## 2018-05-23 DIAGNOSIS — F32.9 REACTIVE DEPRESSION: ICD-10-CM

## 2018-05-23 DIAGNOSIS — L24.9 IRRITANT CONTACT DERMATITIS, UNSPECIFIED TRIGGER: ICD-10-CM

## 2018-05-23 DIAGNOSIS — S80.12XD CONTUSION OF LEFT LOWER LEG, SUBSEQUENT ENCOUNTER: ICD-10-CM

## 2018-05-23 DIAGNOSIS — M79.10 MYALGIA: ICD-10-CM

## 2018-05-23 DIAGNOSIS — I10 ESSENTIAL HYPERTENSION: Primary | ICD-10-CM

## 2018-05-23 DIAGNOSIS — I25.810 ATHEROSCLEROSIS OF CORONARY ARTERY BYPASS GRAFT OF NATIVE HEART WITHOUT ANGINA PECTORIS: ICD-10-CM

## 2018-05-23 DIAGNOSIS — E78.2 MIXED HYPERLIPIDEMIA: ICD-10-CM

## 2018-05-23 DIAGNOSIS — R26.81 UNSTABLE GAIT: ICD-10-CM

## 2018-05-23 DIAGNOSIS — F51.04 PSYCHOPHYSIOLOGICAL INSOMNIA: ICD-10-CM

## 2018-05-23 PROCEDURE — G8598 ASA/ANTIPLAT THER USED: HCPCS | Performed by: FAMILY MEDICINE

## 2018-05-23 PROCEDURE — 99214 OFFICE O/P EST MOD 30 MIN: CPT | Performed by: FAMILY MEDICINE

## 2018-05-23 PROCEDURE — 3017F COLORECTAL CA SCREEN DOC REV: CPT | Performed by: FAMILY MEDICINE

## 2018-05-23 PROCEDURE — G8427 DOCREV CUR MEDS BY ELIG CLIN: HCPCS | Performed by: FAMILY MEDICINE

## 2018-05-23 PROCEDURE — G8420 CALC BMI NORM PARAMETERS: HCPCS | Performed by: FAMILY MEDICINE

## 2018-05-23 PROCEDURE — 1036F TOBACCO NON-USER: CPT | Performed by: FAMILY MEDICINE

## 2018-05-23 RX ORDER — DIAPER,BRIEF,INFANT-TODD,DISP
EACH MISCELLANEOUS
Qty: 30 G | Refills: 0 | Status: SHIPPED | OUTPATIENT
Start: 2018-05-23 | End: 2018-05-30

## 2018-05-23 RX ORDER — TIZANIDINE 4 MG/1
4 TABLET ORAL NIGHTLY PRN
Qty: 30 TABLET | Refills: 1 | Status: ON HOLD | OUTPATIENT
Start: 2018-05-23 | End: 2018-10-04 | Stop reason: ALTCHOICE

## 2018-05-28 ASSESSMENT — ENCOUNTER SYMPTOMS
DIARRHEA: 0
TROUBLE SWALLOWING: 0
CONSTIPATION: 0
SHORTNESS OF BREATH: 1
SORE THROAT: 0
RHINORRHEA: 0
COUGH: 0
CHEST TIGHTNESS: 0
NAUSEA: 0
ABDOMINAL PAIN: 0
BACK PAIN: 0

## 2018-06-28 DIAGNOSIS — I25.10 CORONARY ARTERY DISEASE INVOLVING NATIVE CORONARY ARTERY OF NATIVE HEART, ANGINA PRESENCE UNSPECIFIED: ICD-10-CM

## 2018-06-30 RX ORDER — CLOPIDOGREL BISULFATE 75 MG/1
75 TABLET ORAL DAILY
Qty: 90 TABLET | Refills: 0 | Status: SHIPPED | OUTPATIENT
Start: 2018-06-30 | End: 2018-10-04 | Stop reason: SDUPTHER

## 2018-07-13 ENCOUNTER — TELEPHONE (OUTPATIENT)
Dept: GASTROENTEROLOGY | Age: 58
End: 2018-07-13

## 2018-10-04 ENCOUNTER — HOSPITAL ENCOUNTER (OUTPATIENT)
Age: 58
Setting detail: OBSERVATION
Discharge: HOME | End: 2018-10-05
Attending: EMERGENCY MEDICINE | Admitting: EMERGENCY MEDICINE
Payer: MEDICARE

## 2018-10-04 ENCOUNTER — APPOINTMENT (OUTPATIENT)
Dept: GENERAL RADIOLOGY | Age: 58
End: 2018-10-04
Payer: MEDICARE

## 2018-10-04 DIAGNOSIS — I25.10 CORONARY ARTERY DISEASE INVOLVING NATIVE CORONARY ARTERY OF NATIVE HEART, ANGINA PRESENCE UNSPECIFIED: ICD-10-CM

## 2018-10-04 DIAGNOSIS — R07.9 CHEST PAIN, UNSPECIFIED TYPE: Primary | ICD-10-CM

## 2018-10-04 LAB
ADENOVIRUS PCR: NOT DETECTED
ANION GAP SERPL CALCULATED.3IONS-SCNC: 9 MMOL/L (ref 9–17)
BORDETELLA PERTUSSIS PCR: NOT DETECTED
BUN BLDV-MCNC: 13 MG/DL (ref 8–23)
BUN/CREAT BLD: NORMAL (ref 9–20)
CALCIUM SERPL-MCNC: 8.9 MG/DL (ref 8.6–10.4)
CHLAMYDIA PNEUMONIAE BY PCR: NOT DETECTED
CHLORIDE BLD-SCNC: 105 MMOL/L (ref 98–107)
CO2: 24 MMOL/L (ref 20–31)
CORONAVIRUS 229E PCR: NOT DETECTED
CORONAVIRUS HKU1 PCR: NOT DETECTED
CORONAVIRUS NL63 PCR: NOT DETECTED
CORONAVIRUS OC43 PCR: NOT DETECTED
CREAT SERPL-MCNC: 0.74 MG/DL (ref 0.7–1.2)
GFR AFRICAN AMERICAN: >60 ML/MIN
GFR NON-AFRICAN AMERICAN: >60 ML/MIN
GFR SERPL CREATININE-BSD FRML MDRD: NORMAL ML/MIN/{1.73_M2}
GFR SERPL CREATININE-BSD FRML MDRD: NORMAL ML/MIN/{1.73_M2}
GLUCOSE BLD-MCNC: 98 MG/DL (ref 70–99)
HCT VFR BLD CALC: 45.7 % (ref 40.7–50.3)
HEMOGLOBIN: 14.9 G/DL (ref 13–17)
HUMAN METAPNEUMOVIRUS PCR: NOT DETECTED
INFLUENZA A BY PCR: NOT DETECTED
INFLUENZA A H1 (2009) PCR: NORMAL
INFLUENZA A H1 PCR: NORMAL
INFLUENZA A H3 PCR: NORMAL
INFLUENZA B BY PCR: NOT DETECTED
MCH RBC QN AUTO: 30.2 PG (ref 25.2–33.5)
MCHC RBC AUTO-ENTMCNC: 32.6 G/DL (ref 28.4–34.8)
MCV RBC AUTO: 92.5 FL (ref 82.6–102.9)
MYCOPLASMA PNEUMONIAE PCR: NOT DETECTED
NRBC AUTOMATED: 0 PER 100 WBC
PARAINFLUENZA 1 PCR: NOT DETECTED
PARAINFLUENZA 2 PCR: NOT DETECTED
PARAINFLUENZA 3 PCR: NOT DETECTED
PARAINFLUENZA 4 PCR: NOT DETECTED
PDW BLD-RTO: 12.3 % (ref 11.8–14.4)
PLATELET # BLD: 236 K/UL (ref 138–453)
PMV BLD AUTO: 10.8 FL (ref 8.1–13.5)
POC TROPONIN I: 0 NG/ML (ref 0–0.1)
POC TROPONIN I: 0 NG/ML (ref 0–0.1)
POC TROPONIN INTERP: NORMAL
POC TROPONIN INTERP: NORMAL
POTASSIUM SERPL-SCNC: 4.6 MMOL/L (ref 3.7–5.3)
RBC # BLD: 4.94 M/UL (ref 4.21–5.77)
RESP SYNCYTIAL VIRUS PCR: NOT DETECTED
RHINO/ENTEROVIRUS PCR: NOT DETECTED
SODIUM BLD-SCNC: 138 MMOL/L (ref 135–144)
SOURCE: NORMAL
WBC # BLD: 9.8 K/UL (ref 3.5–11.3)

## 2018-10-04 PROCEDURE — 6370000000 HC RX 637 (ALT 250 FOR IP): Performed by: STUDENT IN AN ORGANIZED HEALTH CARE EDUCATION/TRAINING PROGRAM

## 2018-10-04 PROCEDURE — 84484 ASSAY OF TROPONIN QUANT: CPT

## 2018-10-04 PROCEDURE — 96376 TX/PRO/DX INJ SAME DRUG ADON: CPT

## 2018-10-04 PROCEDURE — 85027 COMPLETE CBC AUTOMATED: CPT

## 2018-10-04 PROCEDURE — G0378 HOSPITAL OBSERVATION PER HR: HCPCS

## 2018-10-04 PROCEDURE — 71045 X-RAY EXAM CHEST 1 VIEW: CPT

## 2018-10-04 PROCEDURE — 99285 EMERGENCY DEPT VISIT HI MDM: CPT

## 2018-10-04 PROCEDURE — 87581 M.PNEUMON DNA AMP PROBE: CPT

## 2018-10-04 PROCEDURE — 96375 TX/PRO/DX INJ NEW DRUG ADDON: CPT

## 2018-10-04 PROCEDURE — 2580000003 HC RX 258: Performed by: STUDENT IN AN ORGANIZED HEALTH CARE EDUCATION/TRAINING PROGRAM

## 2018-10-04 PROCEDURE — 72052 X-RAY EXAM NECK SPINE 6/>VWS: CPT

## 2018-10-04 PROCEDURE — 80048 BASIC METABOLIC PNL TOTAL CA: CPT

## 2018-10-04 PROCEDURE — 96374 THER/PROPH/DIAG INJ IV PUSH: CPT

## 2018-10-04 PROCEDURE — 93005 ELECTROCARDIOGRAM TRACING: CPT

## 2018-10-04 PROCEDURE — 87486 CHLMYD PNEUM DNA AMP PROBE: CPT

## 2018-10-04 PROCEDURE — 87798 DETECT AGENT NOS DNA AMP: CPT

## 2018-10-04 PROCEDURE — 6360000002 HC RX W HCPCS: Performed by: STUDENT IN AN ORGANIZED HEALTH CARE EDUCATION/TRAINING PROGRAM

## 2018-10-04 PROCEDURE — 87633 RESP VIRUS 12-25 TARGETS: CPT

## 2018-10-04 RX ORDER — SODIUM CHLORIDE 0.9 % (FLUSH) 0.9 %
10 SYRINGE (ML) INJECTION PRN
Status: DISCONTINUED | OUTPATIENT
Start: 2018-10-04 | End: 2018-10-05 | Stop reason: HOSPADM

## 2018-10-04 RX ORDER — MORPHINE SULFATE 2 MG/ML
2 INJECTION, SOLUTION INTRAMUSCULAR; INTRAVENOUS
Status: DISCONTINUED | OUTPATIENT
Start: 2018-10-04 | End: 2018-10-05 | Stop reason: HOSPADM

## 2018-10-04 RX ORDER — TRAZODONE HYDROCHLORIDE 50 MG/1
50 TABLET ORAL NIGHTLY
Status: DISCONTINUED | OUTPATIENT
Start: 2018-10-04 | End: 2018-10-05 | Stop reason: HOSPADM

## 2018-10-04 RX ORDER — SODIUM CHLORIDE 9 MG/ML
INJECTION, SOLUTION INTRAVENOUS ONCE
Status: DISCONTINUED | OUTPATIENT
Start: 2018-10-04 | End: 2018-10-04

## 2018-10-04 RX ORDER — ATORVASTATIN CALCIUM 40 MG/1
40 TABLET, FILM COATED ORAL DAILY
Status: DISCONTINUED | OUTPATIENT
Start: 2018-10-04 | End: 2018-10-05 | Stop reason: HOSPADM

## 2018-10-04 RX ORDER — OXYCODONE HYDROCHLORIDE AND ACETAMINOPHEN 5; 325 MG/1; MG/1
1 TABLET ORAL ONCE
Status: COMPLETED | OUTPATIENT
Start: 2018-10-04 | End: 2018-10-04

## 2018-10-04 RX ORDER — MELOXICAM 7.5 MG/1
7.5 TABLET ORAL DAILY
Status: DISCONTINUED | OUTPATIENT
Start: 2018-10-04 | End: 2018-10-05 | Stop reason: HOSPADM

## 2018-10-04 RX ORDER — AMINOPHYLLINE DIHYDRATE 25 MG/ML
100 INJECTION, SOLUTION INTRAVENOUS
Status: DISCONTINUED | OUTPATIENT
Start: 2018-10-04 | End: 2018-10-04

## 2018-10-04 RX ORDER — ONDANSETRON 4 MG/1
4 TABLET, ORALLY DISINTEGRATING ORAL ONCE
Status: COMPLETED | OUTPATIENT
Start: 2018-10-04 | End: 2018-10-04

## 2018-10-04 RX ORDER — IBUPROFEN 800 MG/1
800 TABLET ORAL ONCE
Status: COMPLETED | OUTPATIENT
Start: 2018-10-04 | End: 2018-10-04

## 2018-10-04 RX ORDER — NITROGLYCERIN 0.4 MG/1
0.4 TABLET SUBLINGUAL EVERY 5 MIN PRN
Status: DISCONTINUED | OUTPATIENT
Start: 2018-10-04 | End: 2018-10-05 | Stop reason: HOSPADM

## 2018-10-04 RX ORDER — SODIUM CHLORIDE 0.9 % (FLUSH) 0.9 %
10 SYRINGE (ML) INJECTION EVERY 12 HOURS SCHEDULED
Status: DISCONTINUED | OUTPATIENT
Start: 2018-10-04 | End: 2018-10-05 | Stop reason: HOSPADM

## 2018-10-04 RX ORDER — METOPROLOL TARTRATE 5 MG/5ML
2.5 INJECTION INTRAVENOUS PRN
Status: DISCONTINUED | OUTPATIENT
Start: 2018-10-04 | End: 2018-10-04

## 2018-10-04 RX ORDER — ONDANSETRON 2 MG/ML
4 INJECTION INTRAMUSCULAR; INTRAVENOUS EVERY 8 HOURS PRN
Status: DISCONTINUED | OUTPATIENT
Start: 2018-10-04 | End: 2018-10-05 | Stop reason: HOSPADM

## 2018-10-04 RX ORDER — SODIUM CHLORIDE 0.9 % (FLUSH) 0.9 %
10 SYRINGE (ML) INJECTION PRN
Status: DISCONTINUED | OUTPATIENT
Start: 2018-10-04 | End: 2018-10-04

## 2018-10-04 RX ORDER — NITROGLYCERIN 0.4 MG/1
0.4 TABLET SUBLINGUAL EVERY 5 MIN PRN
Status: DISCONTINUED | OUTPATIENT
Start: 2018-10-04 | End: 2018-10-04

## 2018-10-04 RX ORDER — ACETAMINOPHEN 325 MG/1
650 TABLET ORAL ONCE
Status: COMPLETED | OUTPATIENT
Start: 2018-10-04 | End: 2018-10-04

## 2018-10-04 RX ORDER — MORPHINE SULFATE 4 MG/ML
4 INJECTION, SOLUTION INTRAMUSCULAR; INTRAVENOUS
Status: DISCONTINUED | OUTPATIENT
Start: 2018-10-04 | End: 2018-10-05 | Stop reason: HOSPADM

## 2018-10-04 RX ORDER — ACETAMINOPHEN 325 MG/1
650 TABLET ORAL EVERY 4 HOURS PRN
Status: DISCONTINUED | OUTPATIENT
Start: 2018-10-04 | End: 2018-10-05 | Stop reason: HOSPADM

## 2018-10-04 RX ORDER — TIZANIDINE 4 MG/1
4 TABLET ORAL NIGHTLY PRN
Status: DISCONTINUED | OUTPATIENT
Start: 2018-10-04 | End: 2018-10-05 | Stop reason: HOSPADM

## 2018-10-04 RX ORDER — CLOPIDOGREL BISULFATE 75 MG/1
75 TABLET ORAL DAILY
Status: DISCONTINUED | OUTPATIENT
Start: 2018-10-04 | End: 2018-10-05 | Stop reason: HOSPADM

## 2018-10-04 RX ORDER — KETOROLAC TROMETHAMINE 15 MG/ML
15 INJECTION, SOLUTION INTRAMUSCULAR; INTRAVENOUS ONCE
Status: COMPLETED | OUTPATIENT
Start: 2018-10-04 | End: 2018-10-04

## 2018-10-04 RX ORDER — CLOPIDOGREL BISULFATE 75 MG/1
75 TABLET ORAL DAILY
Qty: 90 TABLET | Refills: 0 | Status: SHIPPED | OUTPATIENT
Start: 2018-10-04 | End: 2019-01-21 | Stop reason: SDUPTHER

## 2018-10-04 RX ADMIN — ONDANSETRON 4 MG: 4 TABLET, ORALLY DISINTEGRATING ORAL at 10:41

## 2018-10-04 RX ADMIN — OXYCODONE HYDROCHLORIDE AND ACETAMINOPHEN 1 TABLET: 5; 325 TABLET ORAL at 14:15

## 2018-10-04 RX ADMIN — ACETAMINOPHEN 650 MG: 325 TABLET ORAL at 10:41

## 2018-10-04 RX ADMIN — KETOROLAC TROMETHAMINE 15 MG: 15 INJECTION, SOLUTION INTRAMUSCULAR; INTRAVENOUS at 12:20

## 2018-10-04 RX ADMIN — TRAZODONE HYDROCHLORIDE 50 MG: 50 TABLET ORAL at 21:16

## 2018-10-04 RX ADMIN — ONDANSETRON 4 MG: 2 INJECTION INTRAMUSCULAR; INTRAVENOUS at 20:38

## 2018-10-04 RX ADMIN — SODIUM CHLORIDE, PRESERVATIVE FREE 10 ML: 5 INJECTION INTRAVENOUS at 21:17

## 2018-10-04 RX ADMIN — MORPHINE SULFATE 4 MG: 4 INJECTION INTRAVENOUS at 16:31

## 2018-10-04 RX ADMIN — MORPHINE SULFATE 4 MG: 4 INJECTION INTRAVENOUS at 20:38

## 2018-10-04 RX ADMIN — IBUPROFEN 800 MG: 800 TABLET ORAL at 10:41

## 2018-10-04 ASSESSMENT — PAIN SCALES - GENERAL
PAINLEVEL_OUTOF10: 8
PAINLEVEL_OUTOF10: 9
PAINLEVEL_OUTOF10: 8
PAINLEVEL_OUTOF10: 8
PAINLEVEL_OUTOF10: 7

## 2018-10-04 ASSESSMENT — PAIN DESCRIPTION - LOCATION
LOCATION: CHEST
LOCATION: CHEST

## 2018-10-04 ASSESSMENT — PAIN DESCRIPTION - ORIENTATION: ORIENTATION: LEFT

## 2018-10-04 ASSESSMENT — PAIN DESCRIPTION - PAIN TYPE: TYPE: ACUTE PAIN

## 2018-10-04 ASSESSMENT — PAIN DESCRIPTION - FREQUENCY: FREQUENCY: CONTINUOUS

## 2018-10-04 NOTE — ED PROVIDER NOTES
22, 2017 HISTORY: ORDERING SYSTEM PROVIDED HISTORY: chest pain TECHNOLOGIST PROVIDED HISTORY: chest pain FINDINGS: In status post CABG. The cardiac and mediastinal contours are unchanged in appearance. Interstitial opacities in the medial right lung base are asymmetric and more prominent in the interval.  No pneumothorax, evidence for edema or effusion. No acute osseous abnormality identified. Asymmetric interstitial opacities in the right lung base. This may represent atelectasis versus developing consolidation. Xr Cervical Spine W Obliques Flexion And Extension    Result Date: 10/4/2018  EXAMINATION: 6 XRAY VIEWS OF THE  CERVICAL SPINE WITH OBLIQUES AND FLEXION/EXTENSION VIEWS 10/4/2018 9:49 am COMPARISON: None. HISTORY: ORDERING SYSTEM PROVIDED HISTORY: cervical radiculitis TECHNOLOGIST PROVIDED HISTORY: cervical radiculitis please do ap, lat , obliq, and flex and ext lat FINDINGS: C7/T1 is suboptimally visualized on the lateral views. The vertebral body heights are maintained. No listhesis identified in the visualized area. No evidence for dynamic instability. Moderately severe disc disease is present, notably at C5-6 and C6-7. Mild facet arthropathy at C4-5, C5-6 and C6-7. Mild osseous neural foraminal encroachment at C5-6, right greater than left. Carotid calcification is noted. The patient is status post CABG. The lung apices are clear. Multilevel disc disease and facet arthropathy with mild osseous neural foraminal encroachment at C5-6, right greater than left. No evidence for dynamic instability. Visualization of C7/T1 is suboptimal on this exam.   No results found.   MEDICATIONS ORDERED:  Orders Placed This Encounter   Medications    acetaminophen (TYLENOL) tablet 650 mg    ibuprofen (ADVIL;MOTRIN) tablet 800 mg    ondansetron (ZOFRAN-ODT) disintegrating tablet 4 mg    ketorolac (TORADOL) injection 15 mg    oxyCODONE-acetaminophen (PERCOCET) 5-325 MG per tablet 1 tablet

## 2018-10-05 ENCOUNTER — APPOINTMENT (OUTPATIENT)
Dept: NUCLEAR MEDICINE | Age: 58
End: 2018-10-05
Payer: MEDICARE

## 2018-10-05 VITALS
DIASTOLIC BLOOD PRESSURE: 74 MMHG | HEIGHT: 65 IN | BODY MASS INDEX: 22.78 KG/M2 | SYSTOLIC BLOOD PRESSURE: 140 MMHG | WEIGHT: 136.7 LBS | RESPIRATION RATE: 18 BRPM | TEMPERATURE: 98.3 F | HEART RATE: 62 BPM | OXYGEN SATURATION: 96 %

## 2018-10-05 LAB
EKG ATRIAL RATE: 54 BPM
EKG ATRIAL RATE: 58 BPM
EKG ATRIAL RATE: 66 BPM
EKG P AXIS: 35 DEGREES
EKG P AXIS: 49 DEGREES
EKG P AXIS: 61 DEGREES
EKG P-R INTERVAL: 122 MS
EKG P-R INTERVAL: 124 MS
EKG P-R INTERVAL: 132 MS
EKG Q-T INTERVAL: 400 MS
EKG Q-T INTERVAL: 416 MS
EKG Q-T INTERVAL: 442 MS
EKG QRS DURATION: 104 MS
EKG QRS DURATION: 106 MS
EKG QRS DURATION: 110 MS
EKG QTC CALCULATION (BAZETT): 394 MS
EKG QTC CALCULATION (BAZETT): 419 MS
EKG QTC CALCULATION (BAZETT): 433 MS
EKG R AXIS: 31 DEGREES
EKG R AXIS: 40 DEGREES
EKG R AXIS: 53 DEGREES
EKG T AXIS: 50 DEGREES
EKG T AXIS: 55 DEGREES
EKG T AXIS: 65 DEGREES
EKG VENTRICULAR RATE: 54 BPM
EKG VENTRICULAR RATE: 58 BPM
EKG VENTRICULAR RATE: 66 BPM
LV EF: 54 %
LVEF MODALITY: NORMAL

## 2018-10-05 PROCEDURE — 6360000002 HC RX W HCPCS: Performed by: STUDENT IN AN ORGANIZED HEALTH CARE EDUCATION/TRAINING PROGRAM

## 2018-10-05 PROCEDURE — A9500 TC99M SESTAMIBI: HCPCS | Performed by: STUDENT IN AN ORGANIZED HEALTH CARE EDUCATION/TRAINING PROGRAM

## 2018-10-05 PROCEDURE — 2580000003 HC RX 258: Performed by: EMERGENCY MEDICINE

## 2018-10-05 PROCEDURE — 6370000000 HC RX 637 (ALT 250 FOR IP): Performed by: INTERNAL MEDICINE

## 2018-10-05 PROCEDURE — 6370000000 HC RX 637 (ALT 250 FOR IP): Performed by: STUDENT IN AN ORGANIZED HEALTH CARE EDUCATION/TRAINING PROGRAM

## 2018-10-05 PROCEDURE — G0378 HOSPITAL OBSERVATION PER HR: HCPCS

## 2018-10-05 PROCEDURE — 3430000000 HC RX DIAGNOSTIC RADIOPHARMACEUTICAL: Performed by: STUDENT IN AN ORGANIZED HEALTH CARE EDUCATION/TRAINING PROGRAM

## 2018-10-05 PROCEDURE — 2580000003 HC RX 258: Performed by: STUDENT IN AN ORGANIZED HEALTH CARE EDUCATION/TRAINING PROGRAM

## 2018-10-05 PROCEDURE — 93005 ELECTROCARDIOGRAM TRACING: CPT

## 2018-10-05 PROCEDURE — 96376 TX/PRO/DX INJ SAME DRUG ADON: CPT

## 2018-10-05 PROCEDURE — 93017 CV STRESS TEST TRACING ONLY: CPT | Performed by: NURSE PRACTITIONER

## 2018-10-05 PROCEDURE — 78452 HT MUSCLE IMAGE SPECT MULT: CPT

## 2018-10-05 RX ORDER — CARVEDILOL 3.12 MG/1
3.12 TABLET ORAL 2 TIMES DAILY
Qty: 28 TABLET | Refills: 0 | Status: SHIPPED | OUTPATIENT
Start: 2018-10-05 | End: 2018-10-12 | Stop reason: SDUPTHER

## 2018-10-05 RX ORDER — SODIUM CHLORIDE 0.9 % (FLUSH) 0.9 %
10 SYRINGE (ML) INJECTION PRN
Status: DISCONTINUED | OUTPATIENT
Start: 2018-10-05 | End: 2018-10-05

## 2018-10-05 RX ORDER — SODIUM CHLORIDE 0.9 % (FLUSH) 0.9 %
10 SYRINGE (ML) INJECTION 2 TIMES DAILY
Status: DISCONTINUED | OUTPATIENT
Start: 2018-10-05 | End: 2018-10-05 | Stop reason: HOSPADM

## 2018-10-05 RX ORDER — METOPROLOL TARTRATE 5 MG/5ML
2.5 INJECTION INTRAVENOUS PRN
Status: DISCONTINUED | OUTPATIENT
Start: 2018-10-05 | End: 2018-10-05

## 2018-10-05 RX ORDER — NITROGLYCERIN 0.4 MG/1
0.4 TABLET SUBLINGUAL EVERY 5 MIN PRN
Status: DISCONTINUED | OUTPATIENT
Start: 2018-10-05 | End: 2018-10-05

## 2018-10-05 RX ORDER — ASPIRIN 81 MG/1
81 TABLET, CHEWABLE ORAL DAILY
Qty: 30 TABLET | Refills: 3 | Status: SHIPPED | OUTPATIENT
Start: 2018-10-05

## 2018-10-05 RX ORDER — AMINOPHYLLINE DIHYDRATE 25 MG/ML
100 INJECTION, SOLUTION INTRAVENOUS
Status: DISCONTINUED | OUTPATIENT
Start: 2018-10-05 | End: 2018-10-05

## 2018-10-05 RX ORDER — SODIUM CHLORIDE 9 MG/ML
INJECTION, SOLUTION INTRAVENOUS ONCE
Status: DISCONTINUED | OUTPATIENT
Start: 2018-10-05 | End: 2018-10-05

## 2018-10-05 RX ORDER — CARVEDILOL 3.12 MG/1
3.12 TABLET ORAL 2 TIMES DAILY WITH MEALS
Status: DISCONTINUED | OUTPATIENT
Start: 2018-10-05 | End: 2018-10-05 | Stop reason: HOSPADM

## 2018-10-05 RX ORDER — ASPIRIN 81 MG/1
81 TABLET, CHEWABLE ORAL DAILY
Status: DISCONTINUED | OUTPATIENT
Start: 2018-10-05 | End: 2018-10-05 | Stop reason: HOSPADM

## 2018-10-05 RX ORDER — SODIUM CHLORIDE 9 MG/ML
INJECTION, SOLUTION INTRAVENOUS ONCE
Status: COMPLETED | OUTPATIENT
Start: 2018-10-05 | End: 2018-10-05

## 2018-10-05 RX ADMIN — REGADENOSON 0.4 MG: 0.08 INJECTION, SOLUTION INTRAVENOUS at 10:08

## 2018-10-05 RX ADMIN — ASPIRIN 81 MG: 81 TABLET, CHEWABLE ORAL at 13:34

## 2018-10-05 RX ADMIN — MORPHINE SULFATE 4 MG: 4 INJECTION INTRAVENOUS at 01:45

## 2018-10-05 RX ADMIN — ONDANSETRON 4 MG: 2 INJECTION INTRAMUSCULAR; INTRAVENOUS at 07:55

## 2018-10-05 RX ADMIN — SODIUM CHLORIDE: 9 INJECTION, SOLUTION INTRAVENOUS at 09:56

## 2018-10-05 RX ADMIN — DESMOPRESSIN ACETATE 40 MG: 0.2 TABLET ORAL at 13:35

## 2018-10-05 RX ADMIN — SODIUM CHLORIDE, PRESERVATIVE FREE 10 ML: 5 INJECTION INTRAVENOUS at 07:39

## 2018-10-05 RX ADMIN — CLOPIDOGREL 75 MG: 75 TABLET, FILM COATED ORAL at 13:34

## 2018-10-05 RX ADMIN — TETRAKIS(2-METHOXYISOBUTYLISOCYANIDE)COPPER(I) TETRAFLUOROBORATE 15.3 MILLICURIE: 1 INJECTION, POWDER, LYOPHILIZED, FOR SOLUTION INTRAVENOUS at 07:39

## 2018-10-05 RX ADMIN — MORPHINE SULFATE 4 MG: 4 INJECTION INTRAVENOUS at 08:34

## 2018-10-05 RX ADMIN — TETRAKIS(2-METHOXYISOBUTYLISOCYANIDE)COPPER(I) TETRAFLUOROBORATE 38 MILLICURIE: 1 INJECTION, POWDER, LYOPHILIZED, FOR SOLUTION INTRAVENOUS at 10:08

## 2018-10-05 RX ADMIN — SODIUM CHLORIDE, PRESERVATIVE FREE 10 ML: 5 INJECTION INTRAVENOUS at 10:08

## 2018-10-05 ASSESSMENT — PAIN SCALES - GENERAL
PAINLEVEL_OUTOF10: 7
PAINLEVEL_OUTOF10: 8

## 2018-10-05 NOTE — DISCHARGE SUMMARY
discharged. Disposition: Home    Patient stated that they will not drive themselves home from the hospital if they have gotten pain killers/ narcotics earlier that day and that they will arrange for transportation on their own or work with the  for a ride. Patient counseled NOT to drive while under the influence of narcotics/ pain killers. Condition: Good    Patient stable and ready for discharge home. I have discussed plan of care with patient and they are in understanding. They were instructed to read discharge paperwork. All of their questions and concerns were addressed. Time Spent: 0 day      --  Char Murry,   Emergency Medicine Resident Physician    This dictation was generated by voice recognition computer software. Although all attempts are made to edit the dictation for accuracy, there may be errors in the transcription that are not intended.
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
## 2018-10-05 NOTE — H&P
aotitis      DIAGNOSTIC RESULTS     EKG: All EKG's are interpreted by the Observation Physician who either signs or Co-signs this chart in the absence of a cardiologist.    EKG Interpretation    Interpreted by observation physician    Pink Sauce, DO    Past bradycardia ventricular rate 50 beats normal axis and no ectopy normal conduction CT interval 124 QRS duration 104 . No ST elevation or depression. No flattening of T waves 11 and aVL are compared appropriate T-wave balance good R-wave progression noted a Wellens Knodel Momin no pathological Q waves. EKG is relatively unremarkable. Cardiac injury. RADIOLOGY:   I directly visualized the following  images and reviewed the radiologist interpretations:    Xr Chest Portable    Result Date: 10/4/2018  EXAMINATION: SINGLE XRAY VIEW OF THE CHEST 10/4/2018 9:49 am COMPARISON: November 16, 2017, July 22, 2017 HISTORY: ORDERING SYSTEM PROVIDED HISTORY: chest pain TECHNOLOGIST PROVIDED HISTORY: chest pain FINDINGS: In status post CABG. The cardiac and mediastinal contours are unchanged in appearance. Interstitial opacities in the medial right lung base are asymmetric and more prominent in the interval.  No pneumothorax, evidence for edema or effusion. No acute osseous abnormality identified. Asymmetric interstitial opacities in the right lung base. This may represent atelectasis versus developing consolidation. Xr Cervical Spine W Obliques Flexion And Extension    Result Date: 10/4/2018  EXAMINATION: 6 XRAY VIEWS OF THE  CERVICAL SPINE WITH OBLIQUES AND FLEXION/EXTENSION VIEWS 10/4/2018 9:49 am COMPARISON: None. HISTORY: ORDERING SYSTEM PROVIDED HISTORY: cervical radiculitis TECHNOLOGIST PROVIDED HISTORY: cervical radiculitis please do ap, lat , obliq, and flex and ext lat FINDINGS: C7/T1 is suboptimally visualized on the lateral views. The vertebral body heights are maintained. No listhesis identified in the visualized area.   No evidence for

## 2018-10-12 ENCOUNTER — OFFICE VISIT (OUTPATIENT)
Dept: FAMILY MEDICINE CLINIC | Age: 58
End: 2018-10-12
Payer: MEDICARE

## 2018-10-12 VITALS
WEIGHT: 142 LBS | HEART RATE: 64 BPM | SYSTOLIC BLOOD PRESSURE: 113 MMHG | TEMPERATURE: 96.9 F | OXYGEN SATURATION: 98 % | RESPIRATION RATE: 20 BRPM | HEIGHT: 65 IN | BODY MASS INDEX: 23.66 KG/M2 | DIASTOLIC BLOOD PRESSURE: 57 MMHG

## 2018-10-12 DIAGNOSIS — F43.0 ANXIETY AS ACUTE REACTION TO EXCEPTIONAL STRESS: ICD-10-CM

## 2018-10-12 DIAGNOSIS — M50.30 DDD (DEGENERATIVE DISC DISEASE), CERVICAL: Primary | ICD-10-CM

## 2018-10-12 DIAGNOSIS — I10 ESSENTIAL HYPERTENSION: ICD-10-CM

## 2018-10-12 DIAGNOSIS — M25.512 LEFT SHOULDER PAIN, UNSPECIFIED CHRONICITY: ICD-10-CM

## 2018-10-12 DIAGNOSIS — E78.2 MIXED HYPERLIPIDEMIA: ICD-10-CM

## 2018-10-12 DIAGNOSIS — F41.1 ANXIETY AS ACUTE REACTION TO EXCEPTIONAL STRESS: ICD-10-CM

## 2018-10-12 DIAGNOSIS — F51.04 PSYCHOPHYSIOLOGICAL INSOMNIA: ICD-10-CM

## 2018-10-12 DIAGNOSIS — M47.817 DJD (DEGENERATIVE JOINT DISEASE), LUMBOSACRAL: ICD-10-CM

## 2018-10-12 PROCEDURE — G8427 DOCREV CUR MEDS BY ELIG CLIN: HCPCS | Performed by: PHYSICIAN ASSISTANT

## 2018-10-12 PROCEDURE — 1036F TOBACCO NON-USER: CPT | Performed by: PHYSICIAN ASSISTANT

## 2018-10-12 PROCEDURE — G8420 CALC BMI NORM PARAMETERS: HCPCS | Performed by: PHYSICIAN ASSISTANT

## 2018-10-12 PROCEDURE — G8598 ASA/ANTIPLAT THER USED: HCPCS | Performed by: PHYSICIAN ASSISTANT

## 2018-10-12 PROCEDURE — 3017F COLORECTAL CA SCREEN DOC REV: CPT | Performed by: PHYSICIAN ASSISTANT

## 2018-10-12 PROCEDURE — 99214 OFFICE O/P EST MOD 30 MIN: CPT | Performed by: PHYSICIAN ASSISTANT

## 2018-10-12 PROCEDURE — G8484 FLU IMMUNIZE NO ADMIN: HCPCS | Performed by: PHYSICIAN ASSISTANT

## 2018-10-12 RX ORDER — ALPRAZOLAM 0.25 MG/1
TABLET ORAL
Qty: 60 TABLET | Refills: 0 | Status: SHIPPED | OUTPATIENT
Start: 2018-10-12 | End: 2019-05-23 | Stop reason: SDUPTHER

## 2018-10-12 RX ORDER — TRAZODONE HYDROCHLORIDE 50 MG/1
50 TABLET ORAL NIGHTLY
Qty: 30 TABLET | Refills: 3 | Status: SHIPPED | OUTPATIENT
Start: 2018-10-12 | End: 2019-05-10

## 2018-10-12 RX ORDER — CARVEDILOL 3.12 MG/1
3.12 TABLET ORAL 2 TIMES DAILY
Qty: 60 TABLET | Refills: 0 | Status: SHIPPED | OUTPATIENT
Start: 2018-10-12 | End: 2019-05-10

## 2018-10-12 ASSESSMENT — ENCOUNTER SYMPTOMS: BACK PAIN: 1

## 2018-10-12 NOTE — PROGRESS NOTES
[address]  [facility] [address]  Dept: [phone]  Dept Fax: [phone]    Office Progress/Follow Up Note  Date of patient's visit: 10/12/2018  Patient's Name:  Melissa Royal YOB: 1960            Patient Care Team:  Cassie Thorne PA-C as PCP - Carlos Gomez MD as PCP - [facility] Attributed Provider  Apollo Montes MD as Consulting Physician (Gastroenterology)  ================================================================    REASON FOR VISIT/CHIEF COMPLAINT:  Lower Back Pain; Wrist Pain (RIGHT); and Shoulder Pain (Left)    HISTORY OFPRESENTING ILLNESS:  History was obtained from: patient. Melissa Royal is a 59 y.o. is here follow-up for high blood pressure, insomnia, HLD, complaining of neck, shoulder, back. Patient states is compliant with all medications. Patient states neck, shoulder, back pain has been present while. Discussed myalgias in depth with patient, informed patient he will be sent to PT. Patient went to ER 10/4/18 for chest pain, admitted, all lab work was negative, states he will be following up with cardiology. Discussed high blood pressure, insomnia, HLD in depth with patient, stable. Patient blood pressure stable in office. Patient weight is stable in office. Patient requests medication refills and labs reviewed. Health maintenance review. OARRS reviewed, prescribed Xanax 0.25 mg nightly when necessary. Medications reviewed, refilled trazodone 50 mg, carvedilol 3.125 mg. Informed patient there will be no changes in medication for high blood pressure, insomnia, anxiety, continue current treatments.     HPI    Patient Active Problem List   Diagnosis    CAD (coronary artery disease) s/p CABGx4, 10 stents    Anxiety    Chest pain with high risk of acute coronary syndrome    Depression with anxiety    Unstable gait    Left knee sprain    Right knee sprain    Bronchitis    Tenderness of

## 2018-10-15 PROBLEM — S80.12XA CONTUSION OF LEG, LEFT: Status: RESOLVED | Noted: 2017-08-16 | Resolved: 2018-10-15

## 2018-10-15 PROBLEM — S40.029A HEMATOMA OF ARM: Status: RESOLVED | Noted: 2017-08-02 | Resolved: 2018-10-15

## 2018-10-15 ASSESSMENT — ENCOUNTER SYMPTOMS
NAUSEA: 0
VOMITING: 0
DIARRHEA: 0
CONSTIPATION: 0
SHORTNESS OF BREATH: 0
COUGH: 0

## 2018-11-20 ENCOUNTER — TELEPHONE (OUTPATIENT)
Dept: PRIMARY CARE CLINIC | Age: 58
End: 2018-11-20

## 2018-11-20 DIAGNOSIS — E78.2 MIXED HYPERLIPIDEMIA: ICD-10-CM

## 2018-11-21 RX ORDER — ATORVASTATIN CALCIUM 40 MG/1
40 TABLET, FILM COATED ORAL DAILY
Qty: 90 TABLET | Refills: 0 | Status: SHIPPED | OUTPATIENT
Start: 2018-11-21 | End: 2019-02-16 | Stop reason: SDUPTHER

## 2019-01-21 DIAGNOSIS — I25.10 CORONARY ARTERY DISEASE INVOLVING NATIVE CORONARY ARTERY OF NATIVE HEART, ANGINA PRESENCE UNSPECIFIED: ICD-10-CM

## 2019-01-22 RX ORDER — CLOPIDOGREL BISULFATE 75 MG/1
75 TABLET ORAL DAILY
Qty: 90 TABLET | Refills: 0 | Status: SHIPPED | OUTPATIENT
Start: 2019-01-22 | End: 2019-05-23 | Stop reason: SDUPTHER

## 2019-02-12 ENCOUNTER — APPOINTMENT (OUTPATIENT)
Dept: GENERAL RADIOLOGY | Age: 59
DRG: 194 | End: 2019-02-12
Payer: MEDICARE

## 2019-02-12 ENCOUNTER — HOSPITAL ENCOUNTER (INPATIENT)
Age: 59
LOS: 3 days | Discharge: HOME | DRG: 194 | End: 2019-02-15
Attending: EMERGENCY MEDICINE | Admitting: INTERNAL MEDICINE
Payer: MEDICARE

## 2019-02-12 ENCOUNTER — APPOINTMENT (OUTPATIENT)
Dept: CT IMAGING | Age: 59
DRG: 194 | End: 2019-02-12
Payer: MEDICARE

## 2019-02-12 DIAGNOSIS — J10.1 INFLUENZA A WITH RESPIRATORY MANIFESTATIONS: Primary | ICD-10-CM

## 2019-02-12 DIAGNOSIS — A41.9 SEPTICEMIA (HCC): ICD-10-CM

## 2019-02-12 PROBLEM — J11.1 INFLUENZA: Status: ACTIVE | Noted: 2019-02-12

## 2019-02-12 LAB
-: ABNORMAL
ABSOLUTE EOS #: <0.03 K/UL (ref 0–0.44)
ABSOLUTE IMMATURE GRANULOCYTE: 0.08 K/UL (ref 0–0.3)
ABSOLUTE LYMPH #: 1.58 K/UL (ref 1.1–3.7)
ABSOLUTE MONO #: 1.22 K/UL (ref 0.1–1.2)
ALBUMIN SERPL-MCNC: 4.3 G/DL (ref 3.5–5.2)
ALBUMIN/GLOBULIN RATIO: 1.3 (ref 1–2.5)
ALLEN TEST: ABNORMAL
ALP BLD-CCNC: 56 U/L (ref 40–129)
ALT SERPL-CCNC: 24 U/L (ref 5–41)
AMORPHOUS: ABNORMAL
ANION GAP SERPL CALCULATED.3IONS-SCNC: 16 MMOL/L (ref 9–17)
ANION GAP: 13 MMOL/L (ref 7–16)
AST SERPL-CCNC: 29 U/L
BACTERIA: ABNORMAL
BASOPHILS # BLD: 0 % (ref 0–2)
BASOPHILS ABSOLUTE: 0.03 K/UL (ref 0–0.2)
BILIRUB SERPL-MCNC: 0.47 MG/DL (ref 0.3–1.2)
BILIRUBIN URINE: NEGATIVE
BUN BLDV-MCNC: 14 MG/DL (ref 8–23)
BUN/CREAT BLD: ABNORMAL (ref 9–20)
CALCIUM SERPL-MCNC: 9 MG/DL (ref 8.6–10.4)
CASTS UA: ABNORMAL /LPF (ref 0–8)
CHLORIDE BLD-SCNC: 98 MMOL/L (ref 98–107)
CO2: 19 MMOL/L (ref 20–31)
COLOR: YELLOW
CREAT SERPL-MCNC: 1.38 MG/DL (ref 0.7–1.2)
CRYSTALS, UA: ABNORMAL /HPF
DIFFERENTIAL TYPE: ABNORMAL
DIRECT EXAM: ABNORMAL
DIRECT EXAM: ABNORMAL
EOSINOPHILS RELATIVE PERCENT: 0 % (ref 1–4)
EPITHELIAL CELLS UA: ABNORMAL /HPF (ref 0–5)
FIO2: ABNORMAL
GFR AFRICAN AMERICAN: >60 ML/MIN
GFR NON-AFRICAN AMERICAN: 53 ML/MIN
GFR NON-AFRICAN AMERICAN: 59 ML/MIN
GFR SERPL CREATININE-BSD FRML MDRD: >60 ML/MIN
GFR SERPL CREATININE-BSD FRML MDRD: ABNORMAL ML/MIN/{1.73_M2}
GLUCOSE BLD-MCNC: 139 MG/DL (ref 74–100)
GLUCOSE BLD-MCNC: 143 MG/DL (ref 70–99)
GLUCOSE URINE: NEGATIVE
HCO3 VENOUS: 19.5 MMOL/L (ref 22–29)
HCT VFR BLD CALC: 47.2 % (ref 40.7–50.3)
HEMOGLOBIN: 16 G/DL (ref 13–17)
IMMATURE GRANULOCYTES: 1 %
INR BLD: 1
KETONES, URINE: ABNORMAL
LACTIC ACID, SEPSIS WHOLE BLOOD: 0.7 MMOL/L (ref 0.5–1.9)
LACTIC ACID, SEPSIS WHOLE BLOOD: 2.5 MMOL/L (ref 0.5–1.9)
LACTIC ACID, SEPSIS: ABNORMAL MMOL/L (ref 0.5–1.9)
LACTIC ACID, SEPSIS: NORMAL MMOL/L (ref 0.5–1.9)
LEUKOCYTE ESTERASE, URINE: NEGATIVE
LYMPHOCYTES # BLD: 12 % (ref 24–43)
Lab: ABNORMAL
MAGNESIUM: 1.6 MG/DL (ref 1.6–2.6)
MCH RBC QN AUTO: 30.7 PG (ref 25.2–33.5)
MCHC RBC AUTO-ENTMCNC: 33.9 G/DL (ref 28.4–34.8)
MCV RBC AUTO: 90.4 FL (ref 82.6–102.9)
MODE: ABNORMAL
MONOCYTES # BLD: 9 % (ref 3–12)
MUCUS: ABNORMAL
NEGATIVE BASE EXCESS, VEN: 3 (ref 0–2)
NITRITE, URINE: NEGATIVE
NRBC AUTOMATED: 0 PER 100 WBC
O2 DEVICE/FLOW/%: ABNORMAL
O2 SAT, VEN: 87 % (ref 60–85)
OTHER OBSERVATIONS UA: ABNORMAL
PARTIAL THROMBOPLASTIN TIME: 28.8 SEC (ref 20.5–30.5)
PATIENT TEMP: ABNORMAL
PCO2, VEN: 27.7 MM HG (ref 41–51)
PDW BLD-RTO: 12.5 % (ref 11.8–14.4)
PH UA: 5.5 (ref 5–8)
PH VENOUS: 7.46 (ref 7.32–7.43)
PLATELET # BLD: 204 K/UL (ref 138–453)
PLATELET ESTIMATE: ABNORMAL
PMV BLD AUTO: 10.2 FL (ref 8.1–13.5)
PO2, VEN: 48.9 MM HG (ref 30–50)
POC CHLORIDE: 101 MMOL/L (ref 98–107)
POC CREATININE: 1.26 MG/DL (ref 0.51–1.19)
POC HEMATOCRIT: 50 % (ref 41–53)
POC HEMOGLOBIN: 17.2 G/DL (ref 13.5–17.5)
POC IONIZED CALCIUM: 1.06 MMOL/L (ref 1.15–1.33)
POC LACTIC ACID: 2.44 MMOL/L (ref 0.56–1.39)
POC PCO2 TEMP: ABNORMAL MM HG
POC PH TEMP: ABNORMAL
POC PO2 TEMP: ABNORMAL MM HG
POC POTASSIUM: 3.6 MMOL/L (ref 3.5–4.5)
POC SODIUM: 133 MMOL/L (ref 138–146)
POSITIVE BASE EXCESS, VEN: ABNORMAL (ref 0–3)
POTASSIUM SERPL-SCNC: 3.9 MMOL/L (ref 3.7–5.3)
PROTEIN UA: ABNORMAL
PROTHROMBIN TIME: 10.6 SEC (ref 9–12)
RBC # BLD: 5.22 M/UL (ref 4.21–5.77)
RBC # BLD: ABNORMAL 10*6/UL
RBC UA: ABNORMAL /HPF (ref 0–4)
RENAL EPITHELIAL, UA: ABNORMAL /HPF
SAMPLE SITE: ABNORMAL
SEG NEUTROPHILS: 78 % (ref 36–65)
SEGMENTED NEUTROPHILS ABSOLUTE COUNT: 10.49 K/UL (ref 1.5–8.1)
SODIUM BLD-SCNC: 133 MMOL/L (ref 135–144)
SPECIFIC GRAVITY UA: 1.03 (ref 1–1.03)
SPECIMEN DESCRIPTION: ABNORMAL
TOTAL CO2, VENOUS: 20 MMOL/L (ref 23–30)
TOTAL PROTEIN: 7.5 G/DL (ref 6.4–8.3)
TRICHOMONAS: ABNORMAL
TROPONIN INTERP: NORMAL
TROPONIN T: NORMAL NG/ML
TROPONIN, HIGH SENSITIVITY: <6 NG/L (ref 0–22)
TURBIDITY: ABNORMAL
URINE HGB: NEGATIVE
UROBILINOGEN, URINE: NORMAL
WBC # BLD: 13.4 K/UL (ref 3.5–11.3)
WBC # BLD: ABNORMAL 10*3/UL
WBC UA: ABNORMAL /HPF (ref 0–5)
YEAST: ABNORMAL

## 2019-02-12 PROCEDURE — 82803 BLOOD GASES ANY COMBINATION: CPT

## 2019-02-12 PROCEDURE — 82947 ASSAY GLUCOSE BLOOD QUANT: CPT

## 2019-02-12 PROCEDURE — 84132 ASSAY OF SERUM POTASSIUM: CPT

## 2019-02-12 PROCEDURE — 83735 ASSAY OF MAGNESIUM: CPT

## 2019-02-12 PROCEDURE — 99223 1ST HOSP IP/OBS HIGH 75: CPT | Performed by: INTERNAL MEDICINE

## 2019-02-12 PROCEDURE — 6370000000 HC RX 637 (ALT 250 FOR IP): Performed by: STUDENT IN AN ORGANIZED HEALTH CARE EDUCATION/TRAINING PROGRAM

## 2019-02-12 PROCEDURE — 84484 ASSAY OF TROPONIN QUANT: CPT

## 2019-02-12 PROCEDURE — 85014 HEMATOCRIT: CPT

## 2019-02-12 PROCEDURE — 85730 THROMBOPLASTIN TIME PARTIAL: CPT

## 2019-02-12 PROCEDURE — 82565 ASSAY OF CREATININE: CPT

## 2019-02-12 PROCEDURE — 6360000002 HC RX W HCPCS: Performed by: STUDENT IN AN ORGANIZED HEALTH CARE EDUCATION/TRAINING PROGRAM

## 2019-02-12 PROCEDURE — 96367 TX/PROPH/DG ADDL SEQ IV INF: CPT

## 2019-02-12 PROCEDURE — 87086 URINE CULTURE/COLONY COUNT: CPT

## 2019-02-12 PROCEDURE — 2580000003 HC RX 258: Performed by: EMERGENCY MEDICINE

## 2019-02-12 PROCEDURE — 71045 X-RAY EXAM CHEST 1 VIEW: CPT

## 2019-02-12 PROCEDURE — 96375 TX/PRO/DX INJ NEW DRUG ADDON: CPT

## 2019-02-12 PROCEDURE — 6360000004 HC RX CONTRAST MEDICATION: Performed by: EMERGENCY MEDICINE

## 2019-02-12 PROCEDURE — 99285 EMERGENCY DEPT VISIT HI MDM: CPT

## 2019-02-12 PROCEDURE — 82330 ASSAY OF CALCIUM: CPT

## 2019-02-12 PROCEDURE — 87633 RESP VIRUS 12-25 TARGETS: CPT

## 2019-02-12 PROCEDURE — 87581 M.PNEUMON DNA AMP PROBE: CPT

## 2019-02-12 PROCEDURE — 96366 THER/PROPH/DIAG IV INF ADDON: CPT

## 2019-02-12 PROCEDURE — 87798 DETECT AGENT NOS DNA AMP: CPT

## 2019-02-12 PROCEDURE — 85025 COMPLETE CBC W/AUTO DIFF WBC: CPT

## 2019-02-12 PROCEDURE — 6360000002 HC RX W HCPCS: Performed by: EMERGENCY MEDICINE

## 2019-02-12 PROCEDURE — 2580000003 HC RX 258: Performed by: STUDENT IN AN ORGANIZED HEALTH CARE EDUCATION/TRAINING PROGRAM

## 2019-02-12 PROCEDURE — 87040 BLOOD CULTURE FOR BACTERIA: CPT

## 2019-02-12 PROCEDURE — 93005 ELECTROCARDIOGRAM TRACING: CPT

## 2019-02-12 PROCEDURE — 1200000000 HC SEMI PRIVATE

## 2019-02-12 PROCEDURE — 83605 ASSAY OF LACTIC ACID: CPT

## 2019-02-12 PROCEDURE — 87804 INFLUENZA ASSAY W/OPTIC: CPT

## 2019-02-12 PROCEDURE — 80053 COMPREHEN METABOLIC PANEL: CPT

## 2019-02-12 PROCEDURE — 74177 CT ABD & PELVIS W/CONTRAST: CPT

## 2019-02-12 PROCEDURE — 82435 ASSAY OF BLOOD CHLORIDE: CPT

## 2019-02-12 PROCEDURE — 6370000000 HC RX 637 (ALT 250 FOR IP): Performed by: EMERGENCY MEDICINE

## 2019-02-12 PROCEDURE — 96365 THER/PROPH/DIAG IV INF INIT: CPT

## 2019-02-12 PROCEDURE — 85610 PROTHROMBIN TIME: CPT

## 2019-02-12 PROCEDURE — 87486 CHLMYD PNEUM DNA AMP PROBE: CPT

## 2019-02-12 PROCEDURE — 84295 ASSAY OF SERUM SODIUM: CPT

## 2019-02-12 PROCEDURE — 81001 URINALYSIS AUTO W/SCOPE: CPT

## 2019-02-12 RX ORDER — KETOROLAC TROMETHAMINE 30 MG/ML
30 INJECTION, SOLUTION INTRAMUSCULAR; INTRAVENOUS ONCE
Status: COMPLETED | OUTPATIENT
Start: 2019-02-12 | End: 2019-02-12

## 2019-02-12 RX ORDER — OSELTAMIVIR PHOSPHATE 30 MG/1
30 CAPSULE ORAL 2 TIMES DAILY
Status: DISCONTINUED | OUTPATIENT
Start: 2019-02-12 | End: 2019-02-13

## 2019-02-12 RX ORDER — ACETAMINOPHEN 325 MG/1
650 TABLET ORAL ONCE
Status: COMPLETED | OUTPATIENT
Start: 2019-02-12 | End: 2019-02-12

## 2019-02-12 RX ORDER — OSELTAMIVIR PHOSPHATE 75 MG/1
75 CAPSULE ORAL ONCE
Status: COMPLETED | OUTPATIENT
Start: 2019-02-12 | End: 2019-02-12

## 2019-02-12 RX ORDER — CLOPIDOGREL BISULFATE 75 MG/1
75 TABLET ORAL DAILY
Status: DISCONTINUED | OUTPATIENT
Start: 2019-02-12 | End: 2019-02-15 | Stop reason: HOSPADM

## 2019-02-12 RX ORDER — MAGNESIUM SULFATE 1 G/100ML
1 INJECTION INTRAVENOUS PRN
Status: DISCONTINUED | OUTPATIENT
Start: 2019-02-12 | End: 2019-02-15 | Stop reason: HOSPADM

## 2019-02-12 RX ORDER — ACETAMINOPHEN 325 MG/1
650 TABLET ORAL EVERY 4 HOURS PRN
Status: DISCONTINUED | OUTPATIENT
Start: 2019-02-12 | End: 2019-02-15 | Stop reason: HOSPADM

## 2019-02-12 RX ORDER — ASPIRIN 81 MG/1
81 TABLET, CHEWABLE ORAL DAILY
Status: DISCONTINUED | OUTPATIENT
Start: 2019-02-12 | End: 2019-02-15 | Stop reason: HOSPADM

## 2019-02-12 RX ORDER — DOCUSATE SODIUM 100 MG/1
100 CAPSULE, LIQUID FILLED ORAL 2 TIMES DAILY PRN
Status: DISCONTINUED | OUTPATIENT
Start: 2019-02-12 | End: 2019-02-15 | Stop reason: HOSPADM

## 2019-02-12 RX ORDER — ONDANSETRON 2 MG/ML
4 INJECTION INTRAMUSCULAR; INTRAVENOUS ONCE
Status: COMPLETED | OUTPATIENT
Start: 2019-02-12 | End: 2019-02-12

## 2019-02-12 RX ORDER — POTASSIUM CHLORIDE 7.45 MG/ML
10 INJECTION INTRAVENOUS PRN
Status: DISCONTINUED | OUTPATIENT
Start: 2019-02-12 | End: 2019-02-15 | Stop reason: HOSPADM

## 2019-02-12 RX ORDER — ACETAMINOPHEN 325 MG/1
650 TABLET ORAL EVERY 4 HOURS PRN
Status: CANCELLED | OUTPATIENT
Start: 2019-02-12

## 2019-02-12 RX ORDER — SODIUM CHLORIDE 0.9 % (FLUSH) 0.9 %
10 SYRINGE (ML) INJECTION PRN
Status: CANCELLED | OUTPATIENT
Start: 2019-02-12

## 2019-02-12 RX ORDER — VANCOMYCIN HYDROCHLORIDE 1 G/200ML
15 INJECTION, SOLUTION INTRAVENOUS ONCE
Status: COMPLETED | OUTPATIENT
Start: 2019-02-12 | End: 2019-02-12

## 2019-02-12 RX ORDER — HEPARIN SODIUM 5000 [USP'U]/ML
5000 INJECTION, SOLUTION INTRAVENOUS; SUBCUTANEOUS EVERY 8 HOURS SCHEDULED
Status: DISCONTINUED | OUTPATIENT
Start: 2019-02-12 | End: 2019-02-15 | Stop reason: HOSPADM

## 2019-02-12 RX ORDER — ONDANSETRON 2 MG/ML
4 INJECTION INTRAMUSCULAR; INTRAVENOUS EVERY 6 HOURS PRN
Status: DISCONTINUED | OUTPATIENT
Start: 2019-02-12 | End: 2019-02-15 | Stop reason: HOSPADM

## 2019-02-12 RX ORDER — SODIUM CHLORIDE 0.9 % (FLUSH) 0.9 %
10 SYRINGE (ML) INJECTION EVERY 12 HOURS SCHEDULED
Status: DISCONTINUED | OUTPATIENT
Start: 2019-02-12 | End: 2019-02-15 | Stop reason: HOSPADM

## 2019-02-12 RX ORDER — 0.9 % SODIUM CHLORIDE 0.9 %
30 INTRAVENOUS SOLUTION INTRAVENOUS ONCE
Status: COMPLETED | OUTPATIENT
Start: 2019-02-12 | End: 2019-02-12

## 2019-02-12 RX ORDER — CARVEDILOL 3.12 MG/1
3.12 TABLET ORAL 2 TIMES DAILY
Status: CANCELLED | OUTPATIENT
Start: 2019-02-12

## 2019-02-12 RX ORDER — SODIUM CHLORIDE 9 MG/ML
INJECTION, SOLUTION INTRAVENOUS CONTINUOUS
Status: CANCELLED | OUTPATIENT
Start: 2019-02-12

## 2019-02-12 RX ORDER — SODIUM CHLORIDE 9 MG/ML
INJECTION, SOLUTION INTRAVENOUS CONTINUOUS
Status: DISCONTINUED | OUTPATIENT
Start: 2019-02-12 | End: 2019-02-15 | Stop reason: HOSPADM

## 2019-02-12 RX ORDER — SODIUM CHLORIDE 0.9 % (FLUSH) 0.9 %
10 SYRINGE (ML) INJECTION PRN
Status: DISCONTINUED | OUTPATIENT
Start: 2019-02-12 | End: 2019-02-15 | Stop reason: HOSPADM

## 2019-02-12 RX ORDER — CETIRIZINE HYDROCHLORIDE 10 MG/1
10 TABLET ORAL ONCE
Status: COMPLETED | OUTPATIENT
Start: 2019-02-12 | End: 2019-02-12

## 2019-02-12 RX ORDER — ATORVASTATIN CALCIUM 40 MG/1
40 TABLET, FILM COATED ORAL DAILY
Status: DISCONTINUED | OUTPATIENT
Start: 2019-02-12 | End: 2019-02-15 | Stop reason: HOSPADM

## 2019-02-12 RX ORDER — LEVOFLOXACIN 5 MG/ML
750 INJECTION, SOLUTION INTRAVENOUS EVERY 24 HOURS
Status: DISCONTINUED | OUTPATIENT
Start: 2019-02-12 | End: 2019-02-14

## 2019-02-12 RX ORDER — SODIUM CHLORIDE 0.9 % (FLUSH) 0.9 %
10 SYRINGE (ML) INJECTION EVERY 12 HOURS SCHEDULED
Status: CANCELLED | OUTPATIENT
Start: 2019-02-12

## 2019-02-12 RX ADMIN — SODIUM CHLORIDE 1000 ML: 9 INJECTION, SOLUTION INTRAVENOUS at 11:34

## 2019-02-12 RX ADMIN — DESMOPRESSIN ACETATE 40 MG: 0.2 TABLET ORAL at 18:36

## 2019-02-12 RX ADMIN — VANCOMYCIN HYDROCHLORIDE 1000 MG: 1 INJECTION, SOLUTION INTRAVENOUS at 11:43

## 2019-02-12 RX ADMIN — KETOROLAC TROMETHAMINE 30 MG: 30 INJECTION INTRAMUSCULAR; INTRAVENOUS at 13:45

## 2019-02-12 RX ADMIN — ACETAMINOPHEN 650 MG: 325 TABLET ORAL at 11:34

## 2019-02-12 RX ADMIN — SODIUM CHLORIDE: 9 INJECTION, SOLUTION INTRAVENOUS at 17:37

## 2019-02-12 RX ADMIN — LEVOFLOXACIN 750 MG: 5 INJECTION, SOLUTION INTRAVENOUS at 18:36

## 2019-02-12 RX ADMIN — PIPERACILLIN AND TAZOBACTAM 3.38 G: 3; .375 INJECTION, POWDER, LYOPHILIZED, FOR SOLUTION INTRAVENOUS; PARENTERAL at 12:46

## 2019-02-12 RX ADMIN — OSELTAMIVIR PHOSPHATE 75 MG: 75 CAPSULE ORAL at 12:50

## 2019-02-12 RX ADMIN — ONDANSETRON 4 MG: 2 INJECTION INTRAMUSCULAR; INTRAVENOUS at 19:40

## 2019-02-12 RX ADMIN — CETIRIZINE HYDROCHLORIDE 10 MG: 10 TABLET ORAL at 14:06

## 2019-02-12 RX ADMIN — OSELTAMIVIR PHOSPHATE 30 MG: 30 CAPSULE ORAL at 23:06

## 2019-02-12 RX ADMIN — IOPAMIDOL 75 ML: 755 INJECTION, SOLUTION INTRAVENOUS at 13:33

## 2019-02-12 RX ADMIN — ONDANSETRON 4 MG: 2 INJECTION INTRAMUSCULAR; INTRAVENOUS at 11:38

## 2019-02-12 ASSESSMENT — PAIN DESCRIPTION - LOCATION: LOCATION: GENERALIZED

## 2019-02-12 ASSESSMENT — ENCOUNTER SYMPTOMS
ABDOMINAL PAIN: 1
CHOKING: 0
NAUSEA: 1
ABDOMINAL PAIN: 0
CHEST TIGHTNESS: 0
SINUS PAIN: 1
EYE REDNESS: 0
CONSTIPATION: 0
PHOTOPHOBIA: 0
FACIAL SWELLING: 0
SORE THROAT: 0
COUGH: 1
BLOOD IN STOOL: 0
ABDOMINAL DISTENTION: 0
DIARRHEA: 1
SHORTNESS OF BREATH: 1
WHEEZING: 0
SORE THROAT: 1
VOMITING: 0
SHORTNESS OF BREATH: 0
SINUS PRESSURE: 1

## 2019-02-12 ASSESSMENT — PAIN SCALES - GENERAL
PAINLEVEL_OUTOF10: 4
PAINLEVEL_OUTOF10: 0
PAINLEVEL_OUTOF10: 10
PAINLEVEL_OUTOF10: 0
PAINLEVEL_OUTOF10: 4
PAINLEVEL_OUTOF10: 10
PAINLEVEL_OUTOF10: 10

## 2019-02-12 ASSESSMENT — PAIN DESCRIPTION - PAIN TYPE: TYPE: ACUTE PAIN

## 2019-02-12 ASSESSMENT — PAIN DESCRIPTION - ONSET: ONSET: ON-GOING

## 2019-02-12 ASSESSMENT — PAIN DESCRIPTION - PROGRESSION: CLINICAL_PROGRESSION: NOT CHANGED

## 2019-02-12 ASSESSMENT — PAIN DESCRIPTION - DESCRIPTORS: DESCRIPTORS: ACHING

## 2019-02-12 ASSESSMENT — PAIN DESCRIPTION - ORIENTATION: ORIENTATION: OTHER (COMMENT)

## 2019-02-13 LAB
ABSOLUTE EOS #: <0.03 K/UL (ref 0–0.44)
ABSOLUTE IMMATURE GRANULOCYTE: 0.04 K/UL (ref 0–0.3)
ABSOLUTE LYMPH #: 2.48 K/UL (ref 1.1–3.7)
ABSOLUTE MONO #: 1.08 K/UL (ref 0.1–1.2)
ADENOVIRUS PCR: NOT DETECTED
ANION GAP SERPL CALCULATED.3IONS-SCNC: 10 MMOL/L (ref 9–17)
BASOPHILS # BLD: 0 % (ref 0–2)
BASOPHILS ABSOLUTE: <0.03 K/UL (ref 0–0.2)
BORDETELLA PERTUSSIS PCR: NOT DETECTED
BUN BLDV-MCNC: 10 MG/DL (ref 8–23)
BUN/CREAT BLD: ABNORMAL (ref 9–20)
CALCIUM SERPL-MCNC: 8 MG/DL (ref 8.6–10.4)
CHLAMYDIA PNEUMONIAE BY PCR: NOT DETECTED
CHLORIDE BLD-SCNC: 108 MMOL/L (ref 98–107)
CO2: 21 MMOL/L (ref 20–31)
CORONAVIRUS 229E PCR: NOT DETECTED
CORONAVIRUS HKU1 PCR: NOT DETECTED
CORONAVIRUS NL63 PCR: NOT DETECTED
CORONAVIRUS OC43 PCR: NOT DETECTED
CREAT SERPL-MCNC: 1.1 MG/DL (ref 0.7–1.2)
CULTURE: NO GROWTH
DIFFERENTIAL TYPE: ABNORMAL
DIRECT EXAM: NORMAL
EKG ATRIAL RATE: 126 BPM
EKG P AXIS: 31 DEGREES
EKG P-R INTERVAL: 112 MS
EKG Q-T INTERVAL: 312 MS
EKG QRS DURATION: 100 MS
EKG QTC CALCULATION (BAZETT): 451 MS
EKG R AXIS: 53 DEGREES
EKG T AXIS: 45 DEGREES
EKG VENTRICULAR RATE: 126 BPM
EOSINOPHILS RELATIVE PERCENT: 0 % (ref 1–4)
GFR AFRICAN AMERICAN: >60 ML/MIN
GFR NON-AFRICAN AMERICAN: >60 ML/MIN
GFR SERPL CREATININE-BSD FRML MDRD: ABNORMAL ML/MIN/{1.73_M2}
GFR SERPL CREATININE-BSD FRML MDRD: ABNORMAL ML/MIN/{1.73_M2}
GLUCOSE BLD-MCNC: 102 MG/DL (ref 70–99)
GLUCOSE BLD-MCNC: 106 MG/DL (ref 75–110)
GLUCOSE BLD-MCNC: 98 MG/DL (ref 75–110)
HCT VFR BLD CALC: 41.1 % (ref 40.7–50.3)
HEMOGLOBIN: 13.4 G/DL (ref 13–17)
HUMAN METAPNEUMOVIRUS PCR: NOT DETECTED
IMMATURE GRANULOCYTES: 0 %
INFLUENZA A BY PCR: DETECTED
INFLUENZA A H1 (2009) PCR: DETECTED
INFLUENZA A H1 PCR: NOT DETECTED
INFLUENZA A H3 PCR: NOT DETECTED
INFLUENZA B BY PCR: NOT DETECTED
LYMPHOCYTES # BLD: 23 % (ref 24–43)
Lab: NORMAL
Lab: NORMAL
MCH RBC QN AUTO: 30.7 PG (ref 25.2–33.5)
MCHC RBC AUTO-ENTMCNC: 32.6 G/DL (ref 28.4–34.8)
MCV RBC AUTO: 94.1 FL (ref 82.6–102.9)
MONOCYTES # BLD: 10 % (ref 3–12)
MYCOPLASMA PNEUMONIAE PCR: NOT DETECTED
NRBC AUTOMATED: 0 PER 100 WBC
PARAINFLUENZA 1 PCR: NOT DETECTED
PARAINFLUENZA 2 PCR: NOT DETECTED
PARAINFLUENZA 3 PCR: NOT DETECTED
PARAINFLUENZA 4 PCR: NOT DETECTED
PDW BLD-RTO: 12.8 % (ref 11.8–14.4)
PLATELET # BLD: 173 K/UL (ref 138–453)
PLATELET ESTIMATE: ABNORMAL
PMV BLD AUTO: 10.3 FL (ref 8.1–13.5)
POTASSIUM SERPL-SCNC: 3.9 MMOL/L (ref 3.7–5.3)
RBC # BLD: 4.37 M/UL (ref 4.21–5.77)
RBC # BLD: ABNORMAL 10*6/UL
RESP SYNCYTIAL VIRUS PCR: NOT DETECTED
RHINO/ENTEROVIRUS PCR: NOT DETECTED
SEG NEUTROPHILS: 67 % (ref 36–65)
SEGMENTED NEUTROPHILS ABSOLUTE COUNT: 7.38 K/UL (ref 1.5–8.1)
SODIUM BLD-SCNC: 139 MMOL/L (ref 135–144)
SPECIMEN DESCRIPTION: ABNORMAL
SPECIMEN DESCRIPTION: NORMAL
SPECIMEN DESCRIPTION: NORMAL
WBC # BLD: 11 K/UL (ref 3.5–11.3)
WBC # BLD: ABNORMAL 10*3/UL

## 2019-02-13 PROCEDURE — 99233 SBSQ HOSP IP/OBS HIGH 50: CPT | Performed by: INTERNAL MEDICINE

## 2019-02-13 PROCEDURE — 97535 SELF CARE MNGMENT TRAINING: CPT

## 2019-02-13 PROCEDURE — 2580000003 HC RX 258: Performed by: STUDENT IN AN ORGANIZED HEALTH CARE EDUCATION/TRAINING PROGRAM

## 2019-02-13 PROCEDURE — 82947 ASSAY GLUCOSE BLOOD QUANT: CPT

## 2019-02-13 PROCEDURE — 94760 N-INVAS EAR/PLS OXIMETRY 1: CPT

## 2019-02-13 PROCEDURE — 36415 COLL VENOUS BLD VENIPUNCTURE: CPT

## 2019-02-13 PROCEDURE — 6370000000 HC RX 637 (ALT 250 FOR IP): Performed by: HOSPITALIST

## 2019-02-13 PROCEDURE — 6370000000 HC RX 637 (ALT 250 FOR IP): Performed by: STUDENT IN AN ORGANIZED HEALTH CARE EDUCATION/TRAINING PROGRAM

## 2019-02-13 PROCEDURE — 1200000000 HC SEMI PRIVATE

## 2019-02-13 PROCEDURE — 80048 BASIC METABOLIC PNL TOTAL CA: CPT

## 2019-02-13 PROCEDURE — 6360000002 HC RX W HCPCS: Performed by: STUDENT IN AN ORGANIZED HEALTH CARE EDUCATION/TRAINING PROGRAM

## 2019-02-13 PROCEDURE — 85025 COMPLETE CBC W/AUTO DIFF WBC: CPT

## 2019-02-13 PROCEDURE — 97166 OT EVAL MOD COMPLEX 45 MIN: CPT

## 2019-02-13 PROCEDURE — 87449 NOS EACH ORGANISM AG IA: CPT

## 2019-02-13 RX ORDER — CETIRIZINE HYDROCHLORIDE 10 MG/1
10 TABLET ORAL ONCE
Status: COMPLETED | OUTPATIENT
Start: 2019-02-13 | End: 2019-02-13

## 2019-02-13 RX ORDER — BENZONATATE 100 MG/1
200 CAPSULE ORAL 3 TIMES DAILY
Status: DISCONTINUED | OUTPATIENT
Start: 2019-02-13 | End: 2019-02-15 | Stop reason: HOSPADM

## 2019-02-13 RX ORDER — OSELTAMIVIR PHOSPHATE 75 MG/1
75 CAPSULE ORAL 2 TIMES DAILY
Status: DISCONTINUED | OUTPATIENT
Start: 2019-02-13 | End: 2019-02-15 | Stop reason: HOSPADM

## 2019-02-13 RX ADMIN — ACETAMINOPHEN 650 MG: 325 TABLET ORAL at 09:09

## 2019-02-13 RX ADMIN — SODIUM CHLORIDE: 9 INJECTION, SOLUTION INTRAVENOUS at 13:35

## 2019-02-13 RX ADMIN — LEVOFLOXACIN 750 MG: 5 INJECTION, SOLUTION INTRAVENOUS at 19:51

## 2019-02-13 RX ADMIN — Medication 10 ML: at 09:10

## 2019-02-13 RX ADMIN — BENZONATATE 200 MG: 100 CAPSULE ORAL at 21:50

## 2019-02-13 RX ADMIN — ASPIRIN 81 MG: 81 TABLET, CHEWABLE ORAL at 09:09

## 2019-02-13 RX ADMIN — ONDANSETRON 4 MG: 2 INJECTION INTRAMUSCULAR; INTRAVENOUS at 03:39

## 2019-02-13 RX ADMIN — OSELTAMIVIR PHOSPHATE 75 MG: 75 CAPSULE ORAL at 20:43

## 2019-02-13 RX ADMIN — BENZOCAINE, MENTHOL 1 LOZENGE: 15; 3.6 LOZENGE ORAL at 10:58

## 2019-02-13 RX ADMIN — BENZOCAINE, MENTHOL 1 LOZENGE: 15; 3.6 LOZENGE ORAL at 20:43

## 2019-02-13 RX ADMIN — DESMOPRESSIN ACETATE 40 MG: 0.2 TABLET ORAL at 09:09

## 2019-02-13 RX ADMIN — CETIRIZINE HYDROCHLORIDE 10 MG: 10 TABLET ORAL at 01:17

## 2019-02-13 RX ADMIN — CLOPIDOGREL 75 MG: 75 TABLET, FILM COATED ORAL at 09:09

## 2019-02-13 ASSESSMENT — PAIN DESCRIPTION - LOCATION: LOCATION: BACK;GENERALIZED

## 2019-02-13 ASSESSMENT — PAIN DESCRIPTION - PAIN TYPE: TYPE: ACUTE PAIN

## 2019-02-13 ASSESSMENT — PAIN SCALES - GENERAL
PAINLEVEL_OUTOF10: 6
PAINLEVEL_OUTOF10: 6
PAINLEVEL_OUTOF10: 0

## 2019-02-13 ASSESSMENT — PAIN DESCRIPTION - ORIENTATION: ORIENTATION: LOWER

## 2019-02-14 LAB
ANION GAP SERPL CALCULATED.3IONS-SCNC: 12 MMOL/L (ref 9–17)
BUN BLDV-MCNC: 8 MG/DL (ref 8–23)
BUN/CREAT BLD: ABNORMAL (ref 9–20)
CALCIUM SERPL-MCNC: 8.5 MG/DL (ref 8.6–10.4)
CHLORIDE BLD-SCNC: 107 MMOL/L (ref 98–107)
CO2: 20 MMOL/L (ref 20–31)
CREAT SERPL-MCNC: 0.91 MG/DL (ref 0.7–1.2)
GFR AFRICAN AMERICAN: >60 ML/MIN
GFR NON-AFRICAN AMERICAN: >60 ML/MIN
GFR SERPL CREATININE-BSD FRML MDRD: ABNORMAL ML/MIN/{1.73_M2}
GFR SERPL CREATININE-BSD FRML MDRD: ABNORMAL ML/MIN/{1.73_M2}
GLUCOSE BLD-MCNC: 98 MG/DL (ref 70–99)
POTASSIUM SERPL-SCNC: 3.9 MMOL/L (ref 3.7–5.3)
SODIUM BLD-SCNC: 139 MMOL/L (ref 135–144)

## 2019-02-14 PROCEDURE — 80048 BASIC METABOLIC PNL TOTAL CA: CPT

## 2019-02-14 PROCEDURE — 36415 COLL VENOUS BLD VENIPUNCTURE: CPT

## 2019-02-14 PROCEDURE — 6370000000 HC RX 637 (ALT 250 FOR IP): Performed by: STUDENT IN AN ORGANIZED HEALTH CARE EDUCATION/TRAINING PROGRAM

## 2019-02-14 PROCEDURE — 1200000000 HC SEMI PRIVATE

## 2019-02-14 PROCEDURE — 99233 SBSQ HOSP IP/OBS HIGH 50: CPT | Performed by: INTERNAL MEDICINE

## 2019-02-14 PROCEDURE — 76937 US GUIDE VASCULAR ACCESS: CPT

## 2019-02-14 PROCEDURE — 2580000003 HC RX 258: Performed by: STUDENT IN AN ORGANIZED HEALTH CARE EDUCATION/TRAINING PROGRAM

## 2019-02-14 PROCEDURE — 6370000000 HC RX 637 (ALT 250 FOR IP): Performed by: HOSPITALIST

## 2019-02-14 RX ORDER — BENZONATATE 200 MG/1
200 CAPSULE ORAL 3 TIMES DAILY
Qty: 10 CAPSULE | Refills: 0 | Status: SHIPPED | OUTPATIENT
Start: 2019-02-14 | End: 2019-02-21

## 2019-02-14 RX ORDER — LEVOFLOXACIN 500 MG/1
500 TABLET, FILM COATED ORAL DAILY
Qty: 3 TABLET | Refills: 0 | Status: SHIPPED | OUTPATIENT
Start: 2019-02-14 | End: 2019-02-15

## 2019-02-14 RX ORDER — LEVOFLOXACIN 500 MG/1
500 TABLET, FILM COATED ORAL DAILY
Status: DISCONTINUED | OUTPATIENT
Start: 2019-02-14 | End: 2019-02-15 | Stop reason: HOSPADM

## 2019-02-14 RX ORDER — OSELTAMIVIR PHOSPHATE 75 MG/1
75 CAPSULE ORAL 2 TIMES DAILY
Qty: 4 CAPSULE | Refills: 0 | Status: SHIPPED | OUTPATIENT
Start: 2019-02-14 | End: 2019-02-15

## 2019-02-14 RX ORDER — LEVOFLOXACIN 750 MG/1
750 TABLET ORAL DAILY
Status: DISCONTINUED | OUTPATIENT
Start: 2019-02-14 | End: 2019-02-14

## 2019-02-14 RX ORDER — CARVEDILOL 3.12 MG/1
3.12 TABLET ORAL 2 TIMES DAILY
Status: DISCONTINUED | OUTPATIENT
Start: 2019-02-14 | End: 2019-02-15 | Stop reason: HOSPADM

## 2019-02-14 RX ADMIN — LEVOFLOXACIN 500 MG: 750 TABLET, FILM COATED ORAL at 16:32

## 2019-02-14 RX ADMIN — CARVEDILOL 3.12 MG: 3.12 TABLET, FILM COATED ORAL at 22:05

## 2019-02-14 RX ADMIN — OSELTAMIVIR PHOSPHATE 75 MG: 75 CAPSULE ORAL at 22:04

## 2019-02-14 RX ADMIN — BENZOCAINE, MENTHOL 1 LOZENGE: 15; 3.6 LOZENGE ORAL at 22:13

## 2019-02-14 RX ADMIN — ACETAMINOPHEN 650 MG: 325 TABLET ORAL at 09:40

## 2019-02-14 RX ADMIN — BENZONATATE 200 MG: 100 CAPSULE ORAL at 15:23

## 2019-02-14 RX ADMIN — ASPIRIN 81 MG: 81 TABLET, CHEWABLE ORAL at 09:33

## 2019-02-14 RX ADMIN — BENZONATATE 200 MG: 100 CAPSULE ORAL at 22:42

## 2019-02-14 RX ADMIN — DESMOPRESSIN ACETATE 40 MG: 0.2 TABLET ORAL at 09:33

## 2019-02-14 RX ADMIN — CLOPIDOGREL 75 MG: 75 TABLET, FILM COATED ORAL at 09:32

## 2019-02-14 RX ADMIN — OSELTAMIVIR PHOSPHATE 75 MG: 75 CAPSULE ORAL at 09:33

## 2019-02-14 RX ADMIN — SODIUM CHLORIDE: 9 INJECTION, SOLUTION INTRAVENOUS at 22:14

## 2019-02-14 RX ADMIN — CARVEDILOL 3.12 MG: 3.12 TABLET, FILM COATED ORAL at 09:32

## 2019-02-14 RX ADMIN — BENZONATATE 200 MG: 100 CAPSULE ORAL at 09:32

## 2019-02-14 ASSESSMENT — PAIN SCALES - GENERAL
PAINLEVEL_OUTOF10: 0
PAINLEVEL_OUTOF10: 3
PAINLEVEL_OUTOF10: 6

## 2019-02-15 VITALS
RESPIRATION RATE: 18 BRPM | SYSTOLIC BLOOD PRESSURE: 128 MMHG | HEIGHT: 65 IN | TEMPERATURE: 98.3 F | HEART RATE: 62 BPM | WEIGHT: 143.96 LBS | OXYGEN SATURATION: 94 % | DIASTOLIC BLOOD PRESSURE: 75 MMHG | BODY MASS INDEX: 23.99 KG/M2

## 2019-02-15 PROCEDURE — 99239 HOSP IP/OBS DSCHRG MGMT >30: CPT | Performed by: INTERNAL MEDICINE

## 2019-02-15 PROCEDURE — 6370000000 HC RX 637 (ALT 250 FOR IP): Performed by: HOSPITALIST

## 2019-02-15 PROCEDURE — 6370000000 HC RX 637 (ALT 250 FOR IP): Performed by: STUDENT IN AN ORGANIZED HEALTH CARE EDUCATION/TRAINING PROGRAM

## 2019-02-15 RX ORDER — LEVOFLOXACIN 500 MG/1
500 TABLET, FILM COATED ORAL DAILY
Qty: 3 TABLET | Refills: 0 | Status: SHIPPED | OUTPATIENT
Start: 2019-02-15 | End: 2019-02-18

## 2019-02-15 RX ORDER — OSELTAMIVIR PHOSPHATE 75 MG/1
75 CAPSULE ORAL 2 TIMES DAILY
Qty: 4 CAPSULE | Refills: 0 | Status: SHIPPED | OUTPATIENT
Start: 2019-02-15 | End: 2019-02-17

## 2019-02-15 RX ADMIN — CARVEDILOL 3.12 MG: 3.12 TABLET, FILM COATED ORAL at 08:07

## 2019-02-15 RX ADMIN — OSELTAMIVIR PHOSPHATE 75 MG: 75 CAPSULE ORAL at 08:07

## 2019-02-15 RX ADMIN — BENZONATATE 200 MG: 100 CAPSULE ORAL at 08:07

## 2019-02-15 RX ADMIN — CLOPIDOGREL 75 MG: 75 TABLET, FILM COATED ORAL at 08:07

## 2019-02-15 RX ADMIN — DESMOPRESSIN ACETATE 40 MG: 0.2 TABLET ORAL at 08:07

## 2019-02-15 RX ADMIN — LEVOFLOXACIN 500 MG: 750 TABLET, FILM COATED ORAL at 08:07

## 2019-02-15 RX ADMIN — ASPIRIN 81 MG: 81 TABLET, CHEWABLE ORAL at 08:07

## 2019-02-16 ENCOUNTER — CARE COORDINATION (OUTPATIENT)
Dept: CASE MANAGEMENT | Age: 59
End: 2019-02-16

## 2019-02-16 DIAGNOSIS — E78.2 MIXED HYPERLIPIDEMIA: ICD-10-CM

## 2019-02-18 LAB
CULTURE: NORMAL
CULTURE: NORMAL
Lab: NORMAL
Lab: NORMAL
SPECIMEN DESCRIPTION: NORMAL
SPECIMEN DESCRIPTION: NORMAL

## 2019-02-18 RX ORDER — ATORVASTATIN CALCIUM 40 MG/1
40 TABLET, FILM COATED ORAL DAILY
Qty: 90 TABLET | Refills: 0 | Status: SHIPPED | OUTPATIENT
Start: 2019-02-18 | End: 2019-05-23 | Stop reason: SDUPTHER

## 2019-05-10 ENCOUNTER — HOSPITAL ENCOUNTER (EMERGENCY)
Age: 59
Discharge: HOME | End: 2019-05-10
Attending: EMERGENCY MEDICINE
Payer: MEDICARE

## 2019-05-10 ENCOUNTER — TELEPHONE (OUTPATIENT)
Dept: GASTROENTEROLOGY | Age: 59
End: 2019-05-10

## 2019-05-10 ENCOUNTER — APPOINTMENT (OUTPATIENT)
Dept: GENERAL RADIOLOGY | Age: 59
End: 2019-05-10
Payer: MEDICARE

## 2019-05-10 VITALS
OXYGEN SATURATION: 100 % | BODY MASS INDEX: 24.16 KG/M2 | DIASTOLIC BLOOD PRESSURE: 66 MMHG | TEMPERATURE: 98 F | HEART RATE: 60 BPM | RESPIRATION RATE: 16 BRPM | HEIGHT: 65 IN | WEIGHT: 145 LBS | SYSTOLIC BLOOD PRESSURE: 137 MMHG

## 2019-05-10 DIAGNOSIS — R07.9 CHEST PAIN, UNSPECIFIED TYPE: Primary | ICD-10-CM

## 2019-05-10 LAB
ABSOLUTE EOS #: 0.11 K/UL (ref 0–0.44)
ABSOLUTE IMMATURE GRANULOCYTE: <0.03 K/UL (ref 0–0.3)
ABSOLUTE LYMPH #: 2.79 K/UL (ref 1.1–3.7)
ABSOLUTE MONO #: 0.78 K/UL (ref 0.1–1.2)
ANION GAP SERPL CALCULATED.3IONS-SCNC: 11 MMOL/L (ref 9–17)
BASOPHILS # BLD: 1 % (ref 0–2)
BASOPHILS ABSOLUTE: 0.06 K/UL (ref 0–0.2)
BUN BLDV-MCNC: 10 MG/DL (ref 8–23)
BUN/CREAT BLD: ABNORMAL (ref 9–20)
CALCIUM SERPL-MCNC: 9.5 MG/DL (ref 8.6–10.4)
CHLORIDE BLD-SCNC: 103 MMOL/L (ref 98–107)
CO2: 26 MMOL/L (ref 20–31)
CREAT SERPL-MCNC: 0.96 MG/DL (ref 0.7–1.2)
D-DIMER QUANTITATIVE: 0.36 MG/L FEU
DIFFERENTIAL TYPE: NORMAL
EOSINOPHILS RELATIVE PERCENT: 1 % (ref 1–4)
GFR AFRICAN AMERICAN: >60 ML/MIN
GFR NON-AFRICAN AMERICAN: >60 ML/MIN
GFR SERPL CREATININE-BSD FRML MDRD: ABNORMAL ML/MIN/{1.73_M2}
GFR SERPL CREATININE-BSD FRML MDRD: ABNORMAL ML/MIN/{1.73_M2}
GLUCOSE BLD-MCNC: 101 MG/DL (ref 70–99)
HCT VFR BLD CALC: 46.3 % (ref 40.7–50.3)
HEMOGLOBIN: 14.9 G/DL (ref 13–17)
IMMATURE GRANULOCYTES: 0 %
LYMPHOCYTES # BLD: 35 % (ref 24–43)
MCH RBC QN AUTO: 30 PG (ref 25.2–33.5)
MCHC RBC AUTO-ENTMCNC: 32.2 G/DL (ref 28.4–34.8)
MCV RBC AUTO: 93.3 FL (ref 82.6–102.9)
MONOCYTES # BLD: 10 % (ref 3–12)
NRBC AUTOMATED: 0 PER 100 WBC
PDW BLD-RTO: 12.6 % (ref 11.8–14.4)
PLATELET # BLD: 256 K/UL (ref 138–453)
PLATELET ESTIMATE: NORMAL
PMV BLD AUTO: 10.4 FL (ref 8.1–13.5)
POTASSIUM SERPL-SCNC: 4.4 MMOL/L (ref 3.7–5.3)
RBC # BLD: 4.96 M/UL (ref 4.21–5.77)
RBC # BLD: NORMAL 10*6/UL
SEG NEUTROPHILS: 53 % (ref 36–65)
SEGMENTED NEUTROPHILS ABSOLUTE COUNT: 4.18 K/UL (ref 1.5–8.1)
SODIUM BLD-SCNC: 140 MMOL/L (ref 135–144)
TROPONIN INTERP: NORMAL
TROPONIN INTERP: NORMAL
TROPONIN T: NORMAL NG/ML
TROPONIN T: NORMAL NG/ML
TROPONIN, HIGH SENSITIVITY: 6 NG/L (ref 0–22)
TROPONIN, HIGH SENSITIVITY: 6 NG/L (ref 0–22)
WBC # BLD: 7.9 K/UL (ref 3.5–11.3)
WBC # BLD: NORMAL 10*3/UL

## 2019-05-10 PROCEDURE — 71046 X-RAY EXAM CHEST 2 VIEWS: CPT

## 2019-05-10 PROCEDURE — 99285 EMERGENCY DEPT VISIT HI MDM: CPT

## 2019-05-10 PROCEDURE — 96374 THER/PROPH/DIAG INJ IV PUSH: CPT

## 2019-05-10 PROCEDURE — 80048 BASIC METABOLIC PNL TOTAL CA: CPT

## 2019-05-10 PROCEDURE — 85025 COMPLETE CBC W/AUTO DIFF WBC: CPT

## 2019-05-10 PROCEDURE — 84484 ASSAY OF TROPONIN QUANT: CPT

## 2019-05-10 PROCEDURE — 93005 ELECTROCARDIOGRAM TRACING: CPT

## 2019-05-10 PROCEDURE — 6370000000 HC RX 637 (ALT 250 FOR IP): Performed by: STUDENT IN AN ORGANIZED HEALTH CARE EDUCATION/TRAINING PROGRAM

## 2019-05-10 PROCEDURE — 85379 FIBRIN DEGRADATION QUANT: CPT

## 2019-05-10 PROCEDURE — 6360000002 HC RX W HCPCS: Performed by: STUDENT IN AN ORGANIZED HEALTH CARE EDUCATION/TRAINING PROGRAM

## 2019-05-10 RX ORDER — CYCLOBENZAPRINE HCL 5 MG
5 TABLET ORAL 2 TIMES DAILY PRN
Qty: 15 TABLET | Refills: 0 | Status: SHIPPED | OUTPATIENT
Start: 2019-05-10 | End: 2019-05-20

## 2019-05-10 RX ORDER — METOPROLOL SUCCINATE 25 MG/1
25 TABLET, EXTENDED RELEASE ORAL DAILY
COMMUNITY
Start: 2019-08-13 | End: 2021-02-12

## 2019-05-10 RX ORDER — IBUPROFEN 800 MG/1
800 TABLET ORAL 2 TIMES DAILY PRN
Qty: 30 TABLET | Refills: 1 | Status: SHIPPED | OUTPATIENT
Start: 2019-05-10 | End: 2019-08-13 | Stop reason: SDUPTHER

## 2019-05-10 RX ORDER — ASPIRIN 81 MG/1
324 TABLET, CHEWABLE ORAL ONCE
Status: COMPLETED | OUTPATIENT
Start: 2019-05-10 | End: 2019-05-10

## 2019-05-10 RX ORDER — MORPHINE SULFATE 4 MG/ML
4 INJECTION, SOLUTION INTRAMUSCULAR; INTRAVENOUS ONCE
Status: COMPLETED | OUTPATIENT
Start: 2019-05-10 | End: 2019-05-10

## 2019-05-10 RX ADMIN — MORPHINE SULFATE 4 MG: 4 INJECTION INTRAVENOUS at 09:58

## 2019-05-10 RX ADMIN — ASPIRIN 81 MG 324 MG: 81 TABLET ORAL at 09:56

## 2019-05-10 ASSESSMENT — ENCOUNTER SYMPTOMS
CONSTIPATION: 0
VOMITING: 0
COUGH: 1
DIARRHEA: 0
ABDOMINAL PAIN: 0
SHORTNESS OF BREATH: 0
WHEEZING: 0
NAUSEA: 0

## 2019-05-10 ASSESSMENT — PAIN DESCRIPTION - PAIN TYPE: TYPE: ACUTE PAIN

## 2019-05-10 ASSESSMENT — PAIN SCALES - GENERAL
PAINLEVEL_OUTOF10: 8

## 2019-05-10 ASSESSMENT — PAIN DESCRIPTION - DESCRIPTORS: DESCRIPTORS: STABBING

## 2019-05-10 ASSESSMENT — HEART SCORE: ECG: 0

## 2019-05-10 ASSESSMENT — PAIN DESCRIPTION - LOCATION: LOCATION: CHEST;BACK

## 2019-05-10 NOTE — TELEPHONE ENCOUNTER
Patient was a no show on 7/3/18. Please have patient call office if he would like to r /s. Sent back to PCP.
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
## 2019-05-10 NOTE — ED NOTES
Pt reports still feeling about the same. Watching TV with easy respirations. No obvious distress noted.       Ian Pastor RN  05/10/19 1385

## 2019-05-10 NOTE — ED PROVIDER NOTES
[facility]     Emergency Department     Faculty Attestation    I performed a history and physical examination of the patient and discussed management with the resident. I have reviewed and agree with the residents findings including all diagnostic interpretations, and treatment plans as written at the time of my review. Any areas of disagreement are noted on the chart. I was personally present for the key portions of any procedures. I have documented in the chart those procedures where I was not present during the key portions. Documentation of the HPI, Physical Exam and Medical Decision Making performed by latishaibmary is based on my personal performance of the HPI, PE and MDM. For Physician Assistant/ Nurse Practitioner cases/documentation I have personally evaluated this patient and have completed at least one if not all key elements of the E/M (history, physical exam, and MDM). Additional findings are as noted. Primary Care Physician: Carl Roberts PA-C    History: This is a 59 y.o. male who presents to the Emergency Department with complaint of chest pain. The patient presents emergency for complaints chest pain that is been ongoing for last several weeks but worse today. Describes a sharp pain that starts in his back and goes to his chest. He hasassociated shortness of breath, nausea, vomiting or diaphoresis. Movement exacerbates his symptoms. He did see his cardiologist recently for this pain and was placed on metoprolol. The patient does have a history of coronary artery disease and has had bypass surgery as well as coronary stents. Physical:   height is 5' 5\" (1.651 m) and weight is 145 lb (65.8 kg). His oral temperature is 98 °F (36.7 °C). His blood pressure is 135/86 and his pulse is 56. His respiration is 16 and oxygen saturation is 100%.   Lungs are clear to auscultation bilaterally, heart bradycardic with a regular rhythm, abdomen is soft
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
## 2019-05-13 LAB
EKG ATRIAL RATE: 59 BPM
EKG P AXIS: 45 DEGREES
EKG P-R INTERVAL: 130 MS
EKG Q-T INTERVAL: 404 MS
EKG QRS DURATION: 94 MS
EKG QTC CALCULATION (BAZETT): 399 MS
EKG R AXIS: 32 DEGREES
EKG T AXIS: 61 DEGREES
EKG VENTRICULAR RATE: 59 BPM

## 2019-05-23 ENCOUNTER — OFFICE VISIT (OUTPATIENT)
Dept: PRIMARY CARE CLINIC | Age: 59
End: 2019-05-23
Payer: MEDICARE

## 2019-05-23 VITALS
HEIGHT: 65 IN | OXYGEN SATURATION: 97 % | HEART RATE: 64 BPM | RESPIRATION RATE: 20 BRPM | TEMPERATURE: 97 F | SYSTOLIC BLOOD PRESSURE: 127 MMHG | BODY MASS INDEX: 23.82 KG/M2 | WEIGHT: 143 LBS | DIASTOLIC BLOOD PRESSURE: 65 MMHG

## 2019-05-23 DIAGNOSIS — R35.0 URINARY FREQUENCY: Primary | ICD-10-CM

## 2019-05-23 DIAGNOSIS — F41.9 ANXIETY: ICD-10-CM

## 2019-05-23 DIAGNOSIS — F33.41 RECURRENT MAJOR DEPRESSIVE DISORDER IN PARTIAL REMISSION (HCC): ICD-10-CM

## 2019-05-23 DIAGNOSIS — Z12.5 PROSTATE CANCER SCREENING: ICD-10-CM

## 2019-05-23 DIAGNOSIS — E78.2 MIXED HYPERLIPIDEMIA: ICD-10-CM

## 2019-05-23 DIAGNOSIS — I25.10 CORONARY ARTERY DISEASE INVOLVING NATIVE CORONARY ARTERY OF NATIVE HEART, ANGINA PRESENCE UNSPECIFIED: ICD-10-CM

## 2019-05-23 PROCEDURE — 1036F TOBACCO NON-USER: CPT | Performed by: PHYSICIAN ASSISTANT

## 2019-05-23 PROCEDURE — G8420 CALC BMI NORM PARAMETERS: HCPCS | Performed by: PHYSICIAN ASSISTANT

## 2019-05-23 PROCEDURE — G8427 DOCREV CUR MEDS BY ELIG CLIN: HCPCS | Performed by: PHYSICIAN ASSISTANT

## 2019-05-23 PROCEDURE — 3017F COLORECTAL CA SCREEN DOC REV: CPT | Performed by: PHYSICIAN ASSISTANT

## 2019-05-23 PROCEDURE — 99214 OFFICE O/P EST MOD 30 MIN: CPT | Performed by: PHYSICIAN ASSISTANT

## 2019-05-23 PROCEDURE — G8598 ASA/ANTIPLAT THER USED: HCPCS | Performed by: PHYSICIAN ASSISTANT

## 2019-05-23 RX ORDER — ISOSORBIDE MONONITRATE 30 MG/1
30 TABLET, EXTENDED RELEASE ORAL
COMMUNITY
Start: 2019-05-02 | End: 2021-02-12

## 2019-05-23 RX ORDER — FINASTERIDE 5 MG/1
5 TABLET, FILM COATED ORAL DAILY
Qty: 10 TABLET | Refills: 0 | Status: SHIPPED | OUTPATIENT
Start: 2019-05-23 | End: 2019-06-06 | Stop reason: SDUPTHER

## 2019-05-23 RX ORDER — ATORVASTATIN CALCIUM 40 MG/1
40 TABLET, FILM COATED ORAL DAILY
Qty: 90 TABLET | Refills: 1 | Status: SHIPPED | OUTPATIENT
Start: 2019-05-23 | End: 2019-11-22 | Stop reason: SDUPTHER

## 2019-05-23 RX ORDER — ALPRAZOLAM 0.5 MG/1
0.25 TABLET ORAL 2 TIMES DAILY PRN
Qty: 30 TABLET | Refills: 0 | Status: SHIPPED | OUTPATIENT
Start: 2019-05-23 | End: 2019-08-13 | Stop reason: SDUPTHER

## 2019-05-23 RX ORDER — NITROGLYCERIN 0.4 MG/1
0.4 TABLET SUBLINGUAL
COMMUNITY
Start: 2019-03-25

## 2019-05-23 RX ORDER — CLOPIDOGREL BISULFATE 75 MG/1
75 TABLET ORAL DAILY
Qty: 90 TABLET | Refills: 1 | Status: SHIPPED | OUTPATIENT
Start: 2019-05-23 | End: 2019-11-22 | Stop reason: SDUPTHER

## 2019-05-23 ASSESSMENT — ENCOUNTER SYMPTOMS
NAUSEA: 1
SHORTNESS OF BREATH: 1

## 2019-05-23 NOTE — PROGRESS NOTES
Visit Information    Have you changed or started any medications since your last visit including any over-the-counter medicines, vitamins, or herbal medicines? no   Have you stopped taking any of your medications? Is so, why? -  no  Are you having any side effects from any of your medications? - no    Have you seen any other physician or provider since your last visit? yes    Have you had any other diagnostic tests since your last visit? yes    Have you been seen in the emergency room and/or had an admission in a hospital since we last saw you?  yes - ED [facility]   Have you had your routine dental cleaning in the past 6 months?  no     Do you have an active MyChart account? If no, what is the barrier?   Yes    Patient Care Team:  Celine Dover PA-C as PCP - General  Celine Dover PA-C as PCP - S Attributed Provider  Bernardino Frey MD as Consulting Physician (Gastroenterology)    Medical History Review  Past Medical, Family, and Social History reviewed and does not contribute to the patient presenting condition    Health Maintenance   Topic Date Due    Shingles Vaccine (1 of 2) 05/11/2008    Colon cancer screen colonoscopy  05/11/2008    A1C test (Diabetic or Prediabetic)  01/23/2019    Flu vaccine (Season Ended) 09/01/2019    Lipid screen  02/24/2022    DTaP/Tdap/Td vaccine (2 - Td) 04/04/2027    Hepatitis C screen  Addressed    HIV screen  Addressed    Pneumococcal 0-64 years Vaccine  Aged Out
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
## 2019-05-23 NOTE — PATIENT INSTRUCTIONS
· Take finasteride 5 mg daily for 10 days, contact office with symptoms update  · Get labs done in next several weeks

## 2019-05-29 ASSESSMENT — ENCOUNTER SYMPTOMS
COUGH: 0
DIARRHEA: 0
CONSTIPATION: 0
VOMITING: 0
WHEEZING: 0
BACK PAIN: 0

## 2019-06-06 DIAGNOSIS — R35.0 URINARY FREQUENCY: ICD-10-CM

## 2019-06-06 RX ORDER — FINASTERIDE 5 MG/1
5 TABLET, FILM COATED ORAL DAILY
Qty: 30 TABLET | Refills: 3 | Status: ON HOLD | OUTPATIENT
Start: 2019-06-06 | End: 2019-10-15 | Stop reason: ALTCHOICE

## 2019-06-06 NOTE — TELEPHONE ENCOUNTER
Health Maintenance   Topic Date Due    Shingles Vaccine (1 of 2) 05/11/2008    Colon cancer screen colonoscopy  05/11/2008    A1C test (Diabetic or Prediabetic)  01/23/2019    Flu vaccine (Season Ended) 09/01/2019    Lipid screen  02/24/2022    DTaP/Tdap/Td vaccine (2 - Td) 04/04/2027    Hepatitis C screen  Addressed    HIV screen  Addressed    Pneumococcal 0-64 years Vaccine  Aged Out             (applicable per patient's age: Cancer Screenings, Depression Screening, Fall Risk Screening, Immunizations)    Hemoglobin A1C (%)   Date Value   01/23/2018 5.7   09/22/2014 5.7   05/30/2014 5.3     LDL Cholesterol (mg/dL)   Date Value   02/24/2017 54     AST (U/L)   Date Value   02/12/2019 29     ALT (U/L)   Date Value   02/12/2019 24     BUN (mg/dL)   Date Value   05/10/2019 10      (goal A1C is < 7)   (goal LDL is <100) need 30-50% reduction from baseline     BP Readings from Last 3 Encounters:   05/23/19 127/65   05/10/19 137/66   02/15/19 128/75    (goal /80)      All Future Testing planned in [facility]:  Lab Frequency Next Occurrence   Psa screening Once 08/31/2019   Lipid Panel Once 08/31/2019       Next Visit Date:  Future Appointments   Date Time Provider Kris Gautam   10/22/2019  1:40 PM Mirella Smith PA-C [facility]            Patient Active Problem List:     CAD (coronary artery disease) s/p CABGx4, 10 stents     Anxiety     Chest pain with high risk of acute coronary syndrome     Depression with anxiety     Unstable gait     Bronchitis     Tenderness of left calf     DJD (degenerative joint disease), lumbosacral     Chest wall pain, chronic     Coronary artery disease involving native coronary artery of native heart     Psychophysiological insomnia     Frequency of urination     Urinary hesitancy     Nocturia more than twice per night     Mixed hyperlipidemia     Hypertension     Radicular syndrome of left leg     Primary osteoarthritis of both knees     Herpes zoster

## 2019-07-31 ENCOUNTER — CARE COORDINATION (OUTPATIENT)
Dept: CARE COORDINATION | Age: 59
End: 2019-07-31

## 2019-08-13 ENCOUNTER — OFFICE VISIT (OUTPATIENT)
Dept: PRIMARY CARE CLINIC | Age: 59
End: 2019-08-13
Payer: MEDICARE

## 2019-08-13 VITALS
DIASTOLIC BLOOD PRESSURE: 67 MMHG | BODY MASS INDEX: 24.1 KG/M2 | HEART RATE: 63 BPM | SYSTOLIC BLOOD PRESSURE: 113 MMHG | WEIGHT: 144.8 LBS | TEMPERATURE: 97 F

## 2019-08-13 DIAGNOSIS — M25.50 ARTHRALGIA OF MULTIPLE JOINTS: Primary | ICD-10-CM

## 2019-08-13 DIAGNOSIS — Z12.11 COLON CANCER SCREENING: ICD-10-CM

## 2019-08-13 DIAGNOSIS — F41.9 ANXIETY: ICD-10-CM

## 2019-08-13 PROCEDURE — 99213 OFFICE O/P EST LOW 20 MIN: CPT | Performed by: PHYSICIAN ASSISTANT

## 2019-08-13 PROCEDURE — G8420 CALC BMI NORM PARAMETERS: HCPCS | Performed by: PHYSICIAN ASSISTANT

## 2019-08-13 PROCEDURE — G8427 DOCREV CUR MEDS BY ELIG CLIN: HCPCS | Performed by: PHYSICIAN ASSISTANT

## 2019-08-13 PROCEDURE — G8598 ASA/ANTIPLAT THER USED: HCPCS | Performed by: PHYSICIAN ASSISTANT

## 2019-08-13 PROCEDURE — 1036F TOBACCO NON-USER: CPT | Performed by: PHYSICIAN ASSISTANT

## 2019-08-13 PROCEDURE — 3017F COLORECTAL CA SCREEN DOC REV: CPT | Performed by: PHYSICIAN ASSISTANT

## 2019-08-13 RX ORDER — IBUPROFEN 800 MG/1
800 TABLET ORAL 2 TIMES DAILY PRN
Qty: 30 TABLET | Refills: 3 | Status: SHIPPED | OUTPATIENT
Start: 2019-08-13 | End: 2020-05-18

## 2019-08-13 RX ORDER — ALPRAZOLAM 0.5 MG/1
0.25 TABLET ORAL 2 TIMES DAILY PRN
Qty: 30 TABLET | Refills: 1 | Status: SHIPPED | OUTPATIENT
Start: 2019-08-13 | End: 2019-12-17 | Stop reason: SDUPTHER
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
## 2019-08-15 ENCOUNTER — TELEPHONE (OUTPATIENT)
Dept: GASTROENTEROLOGY | Age: 59
End: 2019-08-15

## 2019-08-28 PROBLEM — J11.1 INFLUENZA: Status: RESOLVED | Noted: 2019-02-12 | Resolved: 2019-08-28

## 2019-08-28 ASSESSMENT — ENCOUNTER SYMPTOMS
NAUSEA: 0
VOMITING: 0
WHEEZING: 0
CONSTIPATION: 0
SHORTNESS OF BREATH: 0
BACK PAIN: 0
COUGH: 0
DIARRHEA: 0

## 2019-09-18 ENCOUNTER — OFFICE VISIT (OUTPATIENT)
Dept: GASTROENTEROLOGY | Age: 59
End: 2019-09-18
Payer: MEDICARE

## 2019-09-18 ENCOUNTER — TELEPHONE (OUTPATIENT)
Dept: GASTROENTEROLOGY | Age: 59
End: 2019-09-18

## 2019-09-18 VITALS
SYSTOLIC BLOOD PRESSURE: 126 MMHG | WEIGHT: 145.9 LBS | BODY MASS INDEX: 24.28 KG/M2 | DIASTOLIC BLOOD PRESSURE: 68 MMHG | HEART RATE: 55 BPM

## 2019-09-18 DIAGNOSIS — Z12.11 ENCOUNTER FOR SCREENING COLONOSCOPY: Primary | ICD-10-CM

## 2019-09-18 PROCEDURE — G8427 DOCREV CUR MEDS BY ELIG CLIN: HCPCS | Performed by: INTERNAL MEDICINE

## 2019-09-18 PROCEDURE — 99214 OFFICE O/P EST MOD 30 MIN: CPT | Performed by: INTERNAL MEDICINE

## 2019-09-18 PROCEDURE — G8420 CALC BMI NORM PARAMETERS: HCPCS | Performed by: INTERNAL MEDICINE

## 2019-09-18 ASSESSMENT — ENCOUNTER SYMPTOMS
NAUSEA: 0
SINUS PRESSURE: 0
ANAL BLEEDING: 1
VOICE CHANGE: 0
WHEEZING: 0
SORE THROAT: 0
VOMITING: 0
RECTAL PAIN: 1
TROUBLE SWALLOWING: 0
BLOOD IN STOOL: 0
BACK PAIN: 1
COUGH: 1
CHOKING: 0
ABDOMINAL PAIN: 1
ALLERGIC/IMMUNOLOGIC NEGATIVE: 1
CONSTIPATION: 0
DIARRHEA: 0
ABDOMINAL DISTENTION: 0

## 2019-09-19 ENCOUNTER — TELEPHONE (OUTPATIENT)
Dept: GASTROENTEROLOGY | Age: 59
End: 2019-09-19

## 2019-09-19 NOTE — TELEPHONE ENCOUNTER
Patient is calling the office, he states he was in the office yesterday to see Dr Dann Rodrigues , he was to schedule a colonoscopy but had to see his Cardiologist first, patient verifies he has an appointment 10/8/2019   Writer called patient back, ARCHANA, requesting patient to confirm who his cardiologist is, so we may send Clearance for procedure

## 2019-09-27 ENCOUNTER — OFFICE VISIT (OUTPATIENT)
Dept: FAMILY MEDICINE CLINIC | Age: 59
End: 2019-09-27
Payer: MEDICARE

## 2019-09-27 VITALS
SYSTOLIC BLOOD PRESSURE: 134 MMHG | RESPIRATION RATE: 16 BRPM | WEIGHT: 148 LBS | OXYGEN SATURATION: 100 % | HEIGHT: 65 IN | BODY MASS INDEX: 24.66 KG/M2 | HEART RATE: 55 BPM | TEMPERATURE: 97.3 F | DIASTOLIC BLOOD PRESSURE: 74 MMHG
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
## 2019-09-27 PROCEDURE — 99213 OFFICE O/P EST LOW 20 MIN: CPT | Performed by: FAMILY MEDICINE

## 2019-09-27 RX ORDER — SULFAMETHOXAZOLE AND TRIMETHOPRIM 800; 160 MG/1; MG/1
1 TABLET ORAL 2 TIMES DAILY
Qty: 20 TABLET | Refills: 0 | Status: SHIPPED | OUTPATIENT
Start: 2019-09-27 | End: 2019-10-07

## 2019-09-27 ASSESSMENT — ENCOUNTER SYMPTOMS
NAUSEA: 0
VOMITING: 0

## 2019-09-27 NOTE — PROGRESS NOTES
fever. Gastrointestinal: Negative for nausea and vomiting. Musculoskeletal: Negative. Skin:        Bug bite with surrounding erythema and swelling is worsening   Neurological: Positive for numbness. Hematological: Negative for adenopathy. Objective:     Physical Exam   Constitutional: He is oriented to person, place, and time. He appears well-developed and well-nourished. HENT:   Head: Normocephalic and atraumatic. Eyes: Conjunctivae and EOM are normal.   Cardiovascular: Normal rate, regular rhythm and normal heart sounds. No murmur heard. Pulmonary/Chest: Effort normal and breath sounds normal. No respiratory distress. Musculoskeletal: Normal range of motion. Neurological: He is oriented to person, place, and time. Skin: Skin is warm and dry. There is erythema (Approximately 1 cm bump on left lateral forearm with surrounding erythema and swelling measuring approximately 3 to 4 cm in diameter). Psychiatric: He has a normal mood and affect. His behavior is normal. Judgment and thought content normal.   Vitals reviewed. /74 (Site: Left Upper Arm, Position: Sitting, Cuff Size: Medium Adult)   Pulse 55   Temp 97.3 °F (36.3 °C) (Temporal)   Resp 16   Ht 5' 5\" (1.651 m)   Wt 148 lb (67.1 kg)   SpO2 100%   BMI 24.63 kg/m²     Assessment:       Diagnosis Orders   1. Bug bite with infection, initial encounter         Plan:      1.) Take antibiotic as prescribed  2.) May do warm compresses and sitz baths as needed. Do not squeeze infection  3.) If symptoms do not improve return to clinic    No orders of the defined types were placed in this encounter. Orders Placed This Encounter   Medications    sulfamethoxazole-trimethoprim (BACTRIM DS) 800-160 MG per tablet     Sig: Take 1 tablet by mouth 2 times daily for 10 days     Dispense:  20 tablet     Refill:  0      Patient given educational materials - see patient instructions.   Discussed use, benefit, and side effects of prescribed medications. All patient questions answered. Pt voiced understanding. Patient agreed with treatment plan. Follow up as directed.      Electronicallysigned by Cuba Pat MD on 9/27/2019 at 2:02 PM

## 2019-10-08 RX ORDER — POLYETHYLENE GLYCOL 3350 17 G/17G
POWDER, FOR SOLUTION ORAL
Qty: 238 G | Refills: 0 | Status: SHIPPED | OUTPATIENT
Start: 2019-10-08 | End: 2019-11-07

## 2019-10-08 NOTE — TELEPHONE ENCOUNTER
Verified with Dr Michael Peterson that patient can scheduled for colon prior to coming in for OV. He agreed to proceed with Colon before next OV.

## 2019-10-08 NOTE — TELEPHONE ENCOUNTER
Received cardiac clearance from Dr Franc Cameron for patient to hold his Plavix for 5 days prior to his procedure. Spoke with patient and have him scheduled for 10/15/19 at [facility]. Per patient, he was instructed to stay on his aspirin during his procedure. Writer will verify this information with Dr Hardeep Toribio office. Scanned clearance. Sent bowel prep to the [facility] batsheva Snow.  Prep instructions emailed to [email or web address].

## 2019-10-11 DIAGNOSIS — Z12.11 SCREEN FOR COLON CANCER: Primary | ICD-10-CM

## 2019-10-11 NOTE — TELEPHONE ENCOUNTER
LVM for patient to confirm his colon at [facility] on 10/15/19 at [address] with arrival of Teresa 95 patient has a  and no questions regarding prep. Also, make sure patient has stopped his Plavix and ASA.

## 2019-10-15 ENCOUNTER — ANESTHESIA EVENT (OUTPATIENT)
Dept: ENDOSCOPY | Age: 59
End: 2019-10-15
Payer: MEDICARE

## 2019-10-15 ENCOUNTER — ANESTHESIA (OUTPATIENT)
Dept: ENDOSCOPY | Age: 59
End: 2019-10-15
Payer: MEDICARE

## 2019-10-15 ENCOUNTER — HOSPITAL ENCOUNTER (OUTPATIENT)
Age: 59
Setting detail: OUTPATIENT SURGERY
Discharge: HOME | End: 2019-10-15
Attending: INTERNAL MEDICINE | Admitting: INTERNAL MEDICINE
Payer: MEDICARE

## 2019-10-15 VITALS
TEMPERATURE: 97.7 F | RESPIRATION RATE: 18 BRPM | WEIGHT: 150 LBS | OXYGEN SATURATION: 98 % | DIASTOLIC BLOOD PRESSURE: 63 MMHG | HEIGHT: 65 IN | HEART RATE: 54 BPM | BODY MASS INDEX: 24.99 KG/M2 | SYSTOLIC BLOOD PRESSURE: 134 MMHG

## 2019-10-15 VITALS — DIASTOLIC BLOOD PRESSURE: 70 MMHG | OXYGEN SATURATION: 98 % | SYSTOLIC BLOOD PRESSURE: 106 MMHG

## 2019-10-15 PROCEDURE — 3609010600 HC COLONOSCOPY POLYPECTOMY SNARE/COLD BIOPSY: Performed by: INTERNAL MEDICINE

## 2019-10-15 PROCEDURE — 88305 TISSUE EXAM BY PATHOLOGIST: CPT

## 2019-10-15 PROCEDURE — 7100000001 HC PACU RECOVERY - ADDTL 15 MIN: Performed by: INTERNAL MEDICINE

## 2019-10-15 PROCEDURE — 45385 COLONOSCOPY W/LESION REMOVAL: CPT | Performed by: INTERNAL MEDICINE

## 2019-10-15 PROCEDURE — 3700000001 HC ADD 15 MINUTES (ANESTHESIA): Performed by: INTERNAL MEDICINE

## 2019-10-15 PROCEDURE — 2709999900 HC NON-CHARGEABLE SUPPLY: Performed by: INTERNAL MEDICINE

## 2019-10-15 PROCEDURE — 6360000002 HC RX W HCPCS

## 2019-10-15 PROCEDURE — 2580000003 HC RX 258: Performed by: ANESTHESIOLOGY

## 2019-10-15 PROCEDURE — 7100000000 HC PACU RECOVERY - FIRST 15 MIN: Performed by: INTERNAL MEDICINE

## 2019-10-15 PROCEDURE — 2500000003 HC RX 250 WO HCPCS

## 2019-10-15 PROCEDURE — 3700000000 HC ANESTHESIA ATTENDED CARE: Performed by: INTERNAL MEDICINE

## 2019-10-15 RX ORDER — LIDOCAINE HYDROCHLORIDE 10 MG/ML
INJECTION, SOLUTION EPIDURAL; INFILTRATION; INTRACAUDAL; PERINEURAL PRN
Status: DISCONTINUED | OUTPATIENT
Start: 2019-10-15 | End: 2019-10-15 | Stop reason: SDUPTHER

## 2019-10-15 RX ORDER — PROPOFOL 10 MG/ML
INJECTION, EMULSION INTRAVENOUS PRN
Status: DISCONTINUED | OUTPATIENT
Start: 2019-10-15 | End: 2019-10-15 | Stop reason: SDUPTHER

## 2019-10-15 RX ORDER — SODIUM CHLORIDE, SODIUM LACTATE, POTASSIUM CHLORIDE, CALCIUM CHLORIDE 600; 310; 30; 20 MG/100ML; MG/100ML; MG/100ML; MG/100ML
INJECTION, SOLUTION INTRAVENOUS CONTINUOUS
Status: DISCONTINUED | OUTPATIENT
Start: 2019-10-15 | End: 2019-10-15 | Stop reason: HOSPADM

## 2019-10-15 RX ADMIN — PROPOFOL 340 MG: 10 INJECTION, EMULSION INTRAVENOUS at 09:35

## 2019-10-15 RX ADMIN — SODIUM CHLORIDE, POTASSIUM CHLORIDE, SODIUM LACTATE AND CALCIUM CHLORIDE: 600; 310; 30; 20 INJECTION, SOLUTION INTRAVENOUS at 08:59

## 2019-10-15 RX ADMIN — LIDOCAINE HYDROCHLORIDE 50 MG: 10 INJECTION, SOLUTION EPIDURAL; INFILTRATION; INTRACAUDAL at 09:35

## 2019-10-15 ASSESSMENT — PULMONARY FUNCTION TESTS
PIF_VALUE: 1

## 2019-10-15 ASSESSMENT — PAIN - FUNCTIONAL ASSESSMENT: PAIN_FUNCTIONAL_ASSESSMENT: 0-10

## 2019-10-15 ASSESSMENT — PAIN SCALES - GENERAL: PAINLEVEL_OUTOF10: 0

## 2019-10-16 LAB — SURGICAL PATHOLOGY REPORT: NORMAL

## 2019-10-18 PROBLEM — Z86.0100 HISTORY OF COLON POLYPS: Status: ACTIVE | Noted: 2019-10-15

## 2019-10-18 PROBLEM — Z86.010 HISTORY OF COLON POLYPS: Status: ACTIVE | Noted: 2019-10-15

## 2019-11-22 DIAGNOSIS — I25.10 CORONARY ARTERY DISEASE INVOLVING NATIVE CORONARY ARTERY OF NATIVE HEART, ANGINA PRESENCE UNSPECIFIED: ICD-10-CM

## 2019-11-22 DIAGNOSIS — E78.2 MIXED HYPERLIPIDEMIA: ICD-10-CM

## 2019-11-22 RX ORDER — CLOPIDOGREL BISULFATE 75 MG/1
75 TABLET ORAL DAILY
Qty: 90 TABLET | Refills: 0 | Status: SHIPPED | OUTPATIENT
Start: 2019-11-22 | End: 2020-03-09

## 2019-11-22 RX ORDER — ATORVASTATIN CALCIUM 40 MG/1
40 TABLET, FILM COATED ORAL DAILY
Qty: 90 TABLET | Refills: 0 | Status: SHIPPED | OUTPATIENT
Start: 2019-11-22 | End: 2020-03-09

## 2019-12-17 ENCOUNTER — OFFICE VISIT (OUTPATIENT)
Dept: PRIMARY CARE CLINIC | Age: 59
End: 2019-12-17
Payer: MEDICARE

## 2019-12-17 VITALS
TEMPERATURE: 98.1 F | BODY MASS INDEX: 24.86 KG/M2 | SYSTOLIC BLOOD PRESSURE: 115 MMHG | DIASTOLIC BLOOD PRESSURE: 66 MMHG | WEIGHT: 149.4 LBS | HEART RATE: 57 BPM

## 2019-12-17 DIAGNOSIS — F41.9 ANXIETY: Primary | ICD-10-CM

## 2019-12-17 DIAGNOSIS — Z23 NEEDS FLU SHOT: ICD-10-CM

## 2019-12-17 DIAGNOSIS — R05.8 PRODUCTIVE COUGH: ICD-10-CM

## 2019-12-17 PROCEDURE — 3017F COLORECTAL CA SCREEN DOC REV: CPT | Performed by: PHYSICIAN ASSISTANT

## 2019-12-17 PROCEDURE — G0008 ADMIN INFLUENZA VIRUS VAC: HCPCS | Performed by: PHYSICIAN ASSISTANT

## 2019-12-17 PROCEDURE — G8420 CALC BMI NORM PARAMETERS: HCPCS | Performed by: PHYSICIAN ASSISTANT

## 2019-12-17 PROCEDURE — G8427 DOCREV CUR MEDS BY ELIG CLIN: HCPCS | Performed by: PHYSICIAN ASSISTANT

## 2019-12-17 PROCEDURE — G8598 ASA/ANTIPLAT THER USED: HCPCS | Performed by: PHYSICIAN ASSISTANT

## 2019-12-17 PROCEDURE — 99213 OFFICE O/P EST LOW 20 MIN: CPT | Performed by: PHYSICIAN ASSISTANT

## 2019-12-17 PROCEDURE — 90686 IIV4 VACC NO PRSV 0.5 ML IM: CPT | Performed by: PHYSICIAN ASSISTANT

## 2019-12-17 PROCEDURE — G8482 FLU IMMUNIZE ORDER/ADMIN: HCPCS | Performed by: PHYSICIAN ASSISTANT

## 2019-12-17 PROCEDURE — 1036F TOBACCO NON-USER: CPT | Performed by: PHYSICIAN ASSISTANT

## 2019-12-17 RX ORDER — BENZONATATE 100 MG/1
100 CAPSULE ORAL 2 TIMES DAILY
Qty: 10 CAPSULE | Refills: 0 | Status: SHIPPED | OUTPATIENT
Start: 2019-12-17 | End: 2019-12-22

## 2019-12-17 RX ORDER — ALPRAZOLAM 0.5 MG/1
0.25 TABLET ORAL 2 TIMES DAILY PRN
Qty: 30 TABLET | Refills: 0 | Status: SHIPPED | OUTPATIENT
Start: 2019-12-17 | End: 2020-05-21

## 2019-12-17 ASSESSMENT — ENCOUNTER SYMPTOMS
COUGH: 1
WHEEZING: 1

## 2019-12-22 PROBLEM — R07.9 CHEST PAIN: Status: RESOLVED | Noted: 2018-10-04 | Resolved: 2019-12-22

## 2019-12-22 PROBLEM — S80.02XA CONTUSION OF LEFT KNEE: Status: RESOLVED | Noted: 2017-08-02 | Resolved: 2019-12-22

## 2019-12-22 PROBLEM — H92.02 OTALGIA OF LEFT EAR: Status: RESOLVED | Noted: 2018-05-08 | Resolved: 2019-12-22

## 2019-12-22 ASSESSMENT — ENCOUNTER SYMPTOMS
CONSTIPATION: 0
VOMITING: 0
DIARRHEA: 0
SHORTNESS OF BREATH: 0
BACK PAIN: 0
NAUSEA: 0

## 2020-01-02 ENCOUNTER — APPOINTMENT (OUTPATIENT)
Dept: GENERAL RADIOLOGY | Age: 60
End: 2020-01-02
Payer: MEDICARE

## 2020-01-02 ENCOUNTER — HOSPITAL ENCOUNTER (OUTPATIENT)
Age: 60
Setting detail: OBSERVATION
Discharge: HOME | End: 2020-01-03
Attending: EMERGENCY MEDICINE | Admitting: EMERGENCY MEDICINE
Payer: MEDICARE

## 2020-01-02 PROBLEM — R07.9 CHEST PAIN: Status: ACTIVE | Noted: 2020-01-02

## 2020-01-02 LAB
ABSOLUTE EOS #: 0.18 K/UL (ref 0–0.44)
ABSOLUTE IMMATURE GRANULOCYTE: 0.06 K/UL (ref 0–0.3)
ABSOLUTE LYMPH #: 3.81 K/UL (ref 1.1–3.7)
ABSOLUTE MONO #: 1.2 K/UL (ref 0.1–1.2)
ANION GAP SERPL CALCULATED.3IONS-SCNC: 10 MMOL/L (ref 9–17)
BASOPHILS # BLD: 1 % (ref 0–2)
BASOPHILS ABSOLUTE: 0.06 K/UL (ref 0–0.2)
BUN BLDV-MCNC: 17 MG/DL (ref 8–23)
BUN/CREAT BLD: ABNORMAL (ref 9–20)
CALCIUM SERPL-MCNC: 8.8 MG/DL (ref 8.6–10.4)
CHLORIDE BLD-SCNC: 105 MMOL/L (ref 98–107)
CO2: 24 MMOL/L (ref 20–31)
CREAT SERPL-MCNC: 0.98 MG/DL (ref 0.7–1.2)
D-DIMER QUANTITATIVE: 0.36 MG/L FEU
DIFFERENTIAL TYPE: ABNORMAL
EOSINOPHILS RELATIVE PERCENT: 2 % (ref 1–4)
GFR AFRICAN AMERICAN: >60 ML/MIN
GFR NON-AFRICAN AMERICAN: >60 ML/MIN
GFR SERPL CREATININE-BSD FRML MDRD: ABNORMAL ML/MIN/{1.73_M2}
GFR SERPL CREATININE-BSD FRML MDRD: ABNORMAL ML/MIN/{1.73_M2}
GLUCOSE BLD-MCNC: 118 MG/DL (ref 70–99)
HCT VFR BLD CALC: 44.3 % (ref 40.7–50.3)
HEMOGLOBIN: 14.9 G/DL (ref 13–17)
IMMATURE GRANULOCYTES: 1 %
LYMPHOCYTES # BLD: 32 % (ref 24–43)
MCH RBC QN AUTO: 30.9 PG (ref 25.2–33.5)
MCHC RBC AUTO-ENTMCNC: 33.6 G/DL (ref 28.4–34.8)
MCV RBC AUTO: 91.9 FL (ref 82.6–102.9)
MONOCYTES # BLD: 10 % (ref 3–12)
NRBC AUTOMATED: 0 PER 100 WBC
PDW BLD-RTO: 12.1 % (ref 11.8–14.4)
PLATELET # BLD: 274 K/UL (ref 138–453)
PLATELET ESTIMATE: ABNORMAL
PMV BLD AUTO: 10.1 FL (ref 8.1–13.5)
POTASSIUM SERPL-SCNC: 4.5 MMOL/L (ref 3.7–5.3)
RBC # BLD: 4.82 M/UL (ref 4.21–5.77)
RBC # BLD: ABNORMAL 10*6/UL
SEG NEUTROPHILS: 54 % (ref 36–65)
SEGMENTED NEUTROPHILS ABSOLUTE COUNT: 6.5 K/UL (ref 1.5–8.1)
SODIUM BLD-SCNC: 139 MMOL/L (ref 135–144)
TROPONIN INTERP: NORMAL
TROPONIN INTERP: NORMAL
TROPONIN T: NORMAL NG/ML
TROPONIN T: NORMAL NG/ML
TROPONIN, HIGH SENSITIVITY: 6 NG/L (ref 0–22)
TROPONIN, HIGH SENSITIVITY: 7 NG/L (ref 0–22)
WBC # BLD: 11.8 K/UL (ref 3.5–11.3)
WBC # BLD: ABNORMAL 10*3/UL

## 2020-01-02 PROCEDURE — 6370000000 HC RX 637 (ALT 250 FOR IP): Performed by: STUDENT IN AN ORGANIZED HEALTH CARE EDUCATION/TRAINING PROGRAM

## 2020-01-02 PROCEDURE — 6360000002 HC RX W HCPCS

## 2020-01-02 PROCEDURE — 84484 ASSAY OF TROPONIN QUANT: CPT

## 2020-01-02 PROCEDURE — 71046 X-RAY EXAM CHEST 2 VIEWS: CPT

## 2020-01-02 PROCEDURE — 85379 FIBRIN DEGRADATION QUANT: CPT

## 2020-01-02 PROCEDURE — 96375 TX/PRO/DX INJ NEW DRUG ADDON: CPT

## 2020-01-02 PROCEDURE — 85025 COMPLETE CBC W/AUTO DIFF WBC: CPT

## 2020-01-02 PROCEDURE — 6360000002 HC RX W HCPCS: Performed by: STUDENT IN AN ORGANIZED HEALTH CARE EDUCATION/TRAINING PROGRAM

## 2020-01-02 PROCEDURE — 93005 ELECTROCARDIOGRAM TRACING: CPT | Performed by: STUDENT IN AN ORGANIZED HEALTH CARE EDUCATION/TRAINING PROGRAM

## 2020-01-02 PROCEDURE — G0378 HOSPITAL OBSERVATION PER HR: HCPCS

## 2020-01-02 PROCEDURE — 99285 EMERGENCY DEPT VISIT HI MDM: CPT

## 2020-01-02 PROCEDURE — 2580000003 HC RX 258: Performed by: STUDENT IN AN ORGANIZED HEALTH CARE EDUCATION/TRAINING PROGRAM

## 2020-01-02 PROCEDURE — 96376 TX/PRO/DX INJ SAME DRUG ADON: CPT

## 2020-01-02 PROCEDURE — 96374 THER/PROPH/DIAG INJ IV PUSH: CPT

## 2020-01-02 PROCEDURE — 80048 BASIC METABOLIC PNL TOTAL CA: CPT

## 2020-01-02 RX ORDER — ONDANSETRON 2 MG/ML
INJECTION INTRAMUSCULAR; INTRAVENOUS
Status: COMPLETED
Start: 2020-01-02 | End: 2020-01-02

## 2020-01-02 RX ORDER — SODIUM CHLORIDE 0.9 % (FLUSH) 0.9 %
10 SYRINGE (ML) INJECTION EVERY 12 HOURS SCHEDULED
Status: DISCONTINUED | OUTPATIENT
Start: 2020-01-02 | End: 2020-01-03 | Stop reason: HOSPADM

## 2020-01-02 RX ORDER — CLOPIDOGREL BISULFATE 75 MG/1
75 TABLET ORAL DAILY
Status: DISCONTINUED | OUTPATIENT
Start: 2020-01-02 | End: 2020-01-03 | Stop reason: HOSPADM

## 2020-01-02 RX ORDER — ACETAMINOPHEN 325 MG/1
650 TABLET ORAL EVERY 4 HOURS PRN
Status: DISCONTINUED | OUTPATIENT
Start: 2020-01-02 | End: 2020-01-03 | Stop reason: HOSPADM

## 2020-01-02 RX ORDER — ALPRAZOLAM 0.25 MG/1
0.25 TABLET ORAL 2 TIMES DAILY PRN
Status: DISCONTINUED | OUTPATIENT
Start: 2020-01-02 | End: 2020-01-03 | Stop reason: HOSPADM

## 2020-01-02 RX ORDER — MORPHINE SULFATE 4 MG/ML
4 INJECTION, SOLUTION INTRAMUSCULAR; INTRAVENOUS ONCE
Status: COMPLETED | OUTPATIENT
Start: 2020-01-02 | End: 2020-01-02

## 2020-01-02 RX ORDER — NITROGLYCERIN 0.4 MG/1
0.4 TABLET SUBLINGUAL EVERY 5 MIN PRN
Status: DISCONTINUED | OUTPATIENT
Start: 2020-01-02 | End: 2020-01-03 | Stop reason: HOSPADM

## 2020-01-02 RX ORDER — METOPROLOL SUCCINATE 25 MG/1
25 TABLET, EXTENDED RELEASE ORAL DAILY
Status: DISCONTINUED | OUTPATIENT
Start: 2020-01-02 | End: 2020-01-03 | Stop reason: HOSPADM

## 2020-01-02 RX ORDER — ASPIRIN 81 MG/1
81 TABLET, CHEWABLE ORAL DAILY
Status: DISCONTINUED | OUTPATIENT
Start: 2020-01-02 | End: 2020-01-03 | Stop reason: HOSPADM

## 2020-01-02 RX ORDER — ONDANSETRON 4 MG/1
4 TABLET, ORALLY DISINTEGRATING ORAL EVERY 8 HOURS PRN
Status: DISCONTINUED | OUTPATIENT
Start: 2020-01-02 | End: 2020-01-03 | Stop reason: HOSPADM

## 2020-01-02 RX ORDER — ONDANSETRON 2 MG/ML
4 INJECTION INTRAMUSCULAR; INTRAVENOUS ONCE
Status: COMPLETED | OUTPATIENT
Start: 2020-01-02 | End: 2020-01-02

## 2020-01-02 RX ORDER — ATORVASTATIN CALCIUM 80 MG/1
40 TABLET, FILM COATED ORAL NIGHTLY
Status: DISCONTINUED | OUTPATIENT
Start: 2020-01-02 | End: 2020-01-03 | Stop reason: HOSPADM

## 2020-01-02 RX ORDER — BENZONATATE 100 MG/1
100 CAPSULE ORAL 3 TIMES DAILY PRN
Status: DISCONTINUED | OUTPATIENT
Start: 2020-01-02 | End: 2020-01-03 | Stop reason: HOSPADM

## 2020-01-02 RX ORDER — ISOSORBIDE MONONITRATE 30 MG/1
30 TABLET, EXTENDED RELEASE ORAL DAILY
Status: DISCONTINUED | OUTPATIENT
Start: 2020-01-02 | End: 2020-01-03 | Stop reason: HOSPADM

## 2020-01-02 RX ORDER — SODIUM CHLORIDE 0.9 % (FLUSH) 0.9 %
10 SYRINGE (ML) INJECTION PRN
Status: DISCONTINUED | OUTPATIENT
Start: 2020-01-02 | End: 2020-01-03 | Stop reason: HOSPADM

## 2020-01-02 RX ORDER — IBUPROFEN 400 MG/1
400 TABLET ORAL EVERY 6 HOURS PRN
Status: DISCONTINUED | OUTPATIENT
Start: 2020-01-02 | End: 2020-01-03 | Stop reason: HOSPADM

## 2020-01-02 RX ADMIN — MORPHINE SULFATE 4 MG: 4 INJECTION INTRAVENOUS at 11:05

## 2020-01-02 RX ADMIN — NITROGLYCERIN 0.4 MG: 0.4 TABLET SUBLINGUAL at 11:39

## 2020-01-02 RX ADMIN — BENZONATATE 100 MG: 100 CAPSULE ORAL at 17:54

## 2020-01-02 RX ADMIN — ONDANSETRON 4 MG: 4 TABLET, ORALLY DISINTEGRATING ORAL at 20:32

## 2020-01-02 RX ADMIN — IBUPROFEN 400 MG: 400 TABLET, FILM COATED ORAL at 17:54

## 2020-01-02 RX ADMIN — ACETAMINOPHEN 650 MG: 325 TABLET ORAL at 20:32

## 2020-01-02 RX ADMIN — Medication 10 ML: at 20:32

## 2020-01-02 RX ADMIN — ONDANSETRON 4 MG: 2 INJECTION INTRAMUSCULAR; INTRAVENOUS at 11:05

## 2020-01-02 RX ADMIN — MORPHINE SULFATE 4 MG: 4 INJECTION INTRAVENOUS at 13:03

## 2020-01-02 RX ADMIN — ACETAMINOPHEN 650 MG: 325 TABLET ORAL at 16:28

## 2020-01-02 RX ADMIN — ATORVASTATIN CALCIUM 40 MG: 80 TABLET, FILM COATED ORAL at 20:32

## 2020-01-02 ASSESSMENT — PAIN DESCRIPTION - ORIENTATION
ORIENTATION: MID
ORIENTATION: LEFT

## 2020-01-02 ASSESSMENT — PAIN DESCRIPTION - FREQUENCY
FREQUENCY: CONTINUOUS

## 2020-01-02 ASSESSMENT — PAIN DESCRIPTION - DIRECTION: RADIATING_TOWARDS: UPPER BACK

## 2020-01-02 ASSESSMENT — PAIN DESCRIPTION - DESCRIPTORS
DESCRIPTORS: SHARP;STABBING
DESCRIPTORS: SHARP;SHOOTING
DESCRIPTORS: RADIATING
DESCRIPTORS: SHARP;STABBING

## 2020-01-02 ASSESSMENT — PAIN DESCRIPTION - LOCATION
LOCATION: CHEST
LOCATION: CHEST;BACK
LOCATION: CHEST

## 2020-01-02 ASSESSMENT — PAIN DESCRIPTION - PAIN TYPE
TYPE: ACUTE PAIN

## 2020-01-02 ASSESSMENT — PAIN SCALES - GENERAL
PAINLEVEL_OUTOF10: 8
PAINLEVEL_OUTOF10: 8
PAINLEVEL_OUTOF10: 6
PAINLEVEL_OUTOF10: 8
PAINLEVEL_OUTOF10: 7
PAINLEVEL_OUTOF10: 9
PAINLEVEL_OUTOF10: 8

## 2020-01-02 ASSESSMENT — HEART SCORE: ECG: 1

## 2020-01-02 ASSESSMENT — PAIN DESCRIPTION - PROGRESSION
CLINICAL_PROGRESSION: GRADUALLY IMPROVING
CLINICAL_PROGRESSION: GRADUALLY IMPROVING

## 2020-01-02 ASSESSMENT — ENCOUNTER SYMPTOMS
COUGH: 1
SHORTNESS OF BREATH: 1
ABDOMINAL PAIN: 0
BACK PAIN: 0

## 2020-01-02 ASSESSMENT — PAIN DESCRIPTION - ONSET
ONSET: ON-GOING

## 2020-01-02 NOTE — PROGRESS NOTES
Cardiac Testing     STRESS 10/5/18: No fixed/reversible perfusion defect. EF 54%. ECHO 2/24/17: EF 55%, mild MR/TR, RVSP is 25 mmHg. Dr. Shant Lao 10/5/18:   1. ASCVD - prior cabg on two separate occasional in 2007 and 2008 who has been under a lot of personal stress. Given his extensive CAD, await stress test results to decide how to proceed. If no large reversible perfusion defects,then medical therapy. If large reversible perfusion defect, then may need cath. Discussed in detail with patient. Keep on statin, plavix, will add aspirin and beta blocker.

## 2020-01-02 NOTE — ED PROVIDER NOTES
[facility] ED  Emergency Department Encounter  Emergency Medicine Resident     Pt Name: Oswaldo Moran  MRN: [id number]  [place] 1960  Date of evaluation: 1/2/20  PCP:  Peyman Coy, [facility]       Chief Complaint   Patient presents with    Chest Pain     Left side chest pain that radiates into left, upper back and shoulder since last night    Dizziness    Cough     Productive cough of yellow sputum since last night       HISTORY OFPRESENT ILLNESS  (Location/Symptom, Timing/Onset, Context/Setting, Quality, Duration, Modifying Factors,Severity.)      Oswaldo Moran is a 62 y. o.yo male patient with a history of 2 CABG 1 in 2007 one in 2008, stent and cardiac cath in 2017 extensive CAD history, on Plavix and beta-blocker who presents with chest pain. States chest pain started yesterday at 8 PM, left anterior chest and has worsened throughout the day. Patient states he did not have nausea or vomiting in the beginning, but feels slightly nauseous at this time. Patient also been having cough for a few days, says cough is productive of yellow sputum. Denies any fevers or chills, denies abdominal pain, denies headache or changes in vision. States the chest pain is 10 out of 10 and feels like his heart attacks in the past.  Patient denies dysuria, changes in bowel movement, recent changes in activity. He does follow with cardiology at [address]  Ida Stockton    PAST MEDICAL / SURGICAL / SOCIAL / FAMILY HISTORY      has a past medical history of Anxiety, Atherosclerosis of autologous vein coronary artery bypass graft, CAD (coronary artery disease), Chronic back pain, Congenital heart disease, Depression, Headache(784.0), History of colon polyps, Hx of heart artery stent, Hyperlipidemia, Hypertension, LONG TERM ANTICOAGULENT USE, MI (myocardial infarction) ([facility] [address] Osteoarthritis, Radicular syndrome of left leg, Restless legs syndrome, S/P CABG x 4, and Shingles Admission medications    Medication Sig Start Date End Date Taking? Authorizing Provider   ALPRAZolam Seretha Billy) 0.5 MG tablet Take 0.5 tablets by mouth 2 times daily as needed for Sleep or Anxiety for up to 60 days. 12/17/19 2/15/20 Yes Leopoldo Goring, PA-C   clopidogrel (PLAVIX) 75 MG tablet TAKE 1 TABLET BY MOUTH DAILY 11/22/19  Yes Leopoldo Goring, PA-C   atorvastatin (LIPITOR) 40 MG tablet TAKE 1 TABLET BY MOUTH DAILY 11/22/19  Yes Leopoldo Goring, PA-C   bisacodyl (DULCOLAX) 5 MG EC tablet TAKE 2 TABS AT 9 AM DAY PRIOR TO COLONOSCOPY 10/8/19  Yes Trell Wills MD   ibuprofen (ADVIL;MOTRIN) 800 MG tablet Take 1 tablet by mouth 2 times daily as needed for Pain 8/13/19  Yes Leopoldo Goring, PA-C   nitroGLYCERIN (NITROSTAT) 0.4 MG SL tablet Place 0.4 mg under the tongue 3/25/19  Yes Historical Provider, MD   isosorbide mononitrate (IMDUR) 30 MG extended release tablet Take 30 mg by mouth 5/2/19  Yes Historical Provider, MD   metoprolol succinate (TOPROL XL) 25 MG extended release tablet Take 25 mg by mouth daily 8/13/19 8/13/20 Yes Historical Provider, MD   aspirin (ASPIRIN CHILDRENS) 81 MG chewable tablet Take 1 tablet by mouth daily 10/5/18  Yes Hidalgo Band, DO       REVIEW OFSYSTEMS    (2-9 systems for level 4, 10 or more for level 5)      Review of Systems   Constitutional: Negative for chills and fever. HENT: Negative for congestion. Eyes: Negative for visual disturbance. Respiratory: Positive for cough and shortness of breath. Cardiovascular: Positive for chest pain. Gastrointestinal: Negative for abdominal pain. Endocrine: Negative for polyuria. Genitourinary: Negative for dysuria. Musculoskeletal: Negative for back pain. Allergic/Immunologic: Negative for immunocompromised state. Neurological: Negative for headaches. Psychiatric/Behavioral: Negative for confusion.        PHYSICAL EXAM   (up to 7 for level 4, 8 or more forlevel 5)      ED TRIAGE VITALS BP: (!) 156/87, 0.00 - 0.30 k/uL    WBC Morphology NOT REPORTED     RBC Morphology NOT REPORTED     Platelet Estimate NOT REPORTED    Basic Metabolic Panel w/ Reflex to MG   Result Value Ref Range    Glucose 118 (H) 70 - 99 mg/dL    BUN 17 8 - 23 mg/dL    CREATININE 0.98 0.70 - 1.20 mg/dL    Bun/Cre Ratio NOT REPORTED 9 - 20    Calcium 8.8 8.6 - 10.4 mg/dL    Sodium 139 135 - 144 mmol/L    Potassium 4.5 3.7 - 5.3 mmol/L    Chloride 105 98 - 107 mmol/L    CO2 24 20 - 31 mmol/L    Anion Gap 10 9 - 17 mmol/L    GFR Non-African American >60 >60 mL/min    GFR African American >60 >60 mL/min    GFR Comment          GFR Staging NOT REPORTED    Troponin   Result Value Ref Range    Troponin, High Sensitivity 7 0 - 22 ng/L    Troponin T NOT REPORTED <0.03 ng/mL    Troponin Interp NOT REPORTED    Troponin   Result Value Ref Range    Troponin, High Sensitivity 6 0 - 22 ng/L    Troponin T NOT REPORTED <0.03 ng/mL    Troponin Interp NOT REPORTED    D-Dimer, Quantitative   Result Value Ref Range    D-Dimer, Quant 0.36 mg/L FEU       RADIOLOGY:  XR CHEST STANDARD (2 VW)   Final Result   No acute process. EKG  Sinus bradycardia without ST segment variation/abnormalities. No T wave inversion seen. All EKG's are interpreted by the Emergency Department Physicianwho either signs or Co-signs this chart in the absence of a cardiologist.    EMERGENCY DEPARTMENT COURSE:  ED Course as of Jan 02 1301   Thu Jan 02, 2020   1053 Seen and assessed in the emergency department no acute respiratory or cardiovascular distress patient patient complaining of chest pain at this time on the left nonradiating, no nausea or stomach pain, slight shortness of breath has had a cough for few days productive of yellow sputum. No fevers or chills. Says chest pain started last night around 8 PM has progressively gotten worse, has a history of 2 surgical bypass, multiple stents in the past.  Significant history of CAD.   EKG shows sinus bradycardia, will get

## 2020-01-03 VITALS
BODY MASS INDEX: 24.16 KG/M2 | DIASTOLIC BLOOD PRESSURE: 75 MMHG | HEART RATE: 63 BPM | SYSTOLIC BLOOD PRESSURE: 105 MMHG | HEIGHT: 65 IN | RESPIRATION RATE: 18 BRPM | TEMPERATURE: 98.1 F | OXYGEN SATURATION: 98 % | WEIGHT: 145 LBS

## 2020-01-03 LAB
EKG ATRIAL RATE: 49 BPM
EKG ATRIAL RATE: 59 BPM
EKG ATRIAL RATE: 62 BPM
EKG P AXIS: 37 DEGREES
EKG P AXIS: 43 DEGREES
EKG P AXIS: 54 DEGREES
EKG P-R INTERVAL: 128 MS
EKG P-R INTERVAL: 132 MS
EKG P-R INTERVAL: 136 MS
EKG Q-T INTERVAL: 400 MS
EKG Q-T INTERVAL: 418 MS
EKG Q-T INTERVAL: 424 MS
EKG QRS DURATION: 102 MS
EKG QTC CALCULATION (BAZETT): 383 MS
EKG QTC CALCULATION (BAZETT): 396 MS
EKG QTC CALCULATION (BAZETT): 424 MS
EKG R AXIS: 2 DEGREES
EKG R AXIS: 21 DEGREES
EKG R AXIS: 8 DEGREES
EKG T AXIS: 49 DEGREES
EKG T AXIS: 57 DEGREES
EKG T AXIS: 59 DEGREES
EKG VENTRICULAR RATE: 49 BPM
EKG VENTRICULAR RATE: 59 BPM
EKG VENTRICULAR RATE: 62 BPM
TROPONIN INTERP: NORMAL
TROPONIN T: NORMAL NG/ML
TROPONIN, HIGH SENSITIVITY: 7 NG/L (ref 0–22)

## 2020-01-03 PROCEDURE — 2580000003 HC RX 258: Performed by: STUDENT IN AN ORGANIZED HEALTH CARE EDUCATION/TRAINING PROGRAM

## 2020-01-03 PROCEDURE — 96372 THER/PROPH/DIAG INJ SC/IM: CPT

## 2020-01-03 PROCEDURE — 93005 ELECTROCARDIOGRAM TRACING: CPT | Performed by: EMERGENCY MEDICINE

## 2020-01-03 PROCEDURE — 84484 ASSAY OF TROPONIN QUANT: CPT

## 2020-01-03 PROCEDURE — G0378 HOSPITAL OBSERVATION PER HR: HCPCS

## 2020-01-03 PROCEDURE — 93010 ELECTROCARDIOGRAM REPORT: CPT | Performed by: INTERNAL MEDICINE

## 2020-01-03 PROCEDURE — 36415 COLL VENOUS BLD VENIPUNCTURE: CPT

## 2020-01-03 PROCEDURE — 6360000002 HC RX W HCPCS: Performed by: STUDENT IN AN ORGANIZED HEALTH CARE EDUCATION/TRAINING PROGRAM

## 2020-01-03 PROCEDURE — 6370000000 HC RX 637 (ALT 250 FOR IP): Performed by: STUDENT IN AN ORGANIZED HEALTH CARE EDUCATION/TRAINING PROGRAM

## 2020-01-03 RX ORDER — SODIUM CHLORIDE 9 MG/ML
INJECTION, SOLUTION INTRAVENOUS CONTINUOUS
Status: DISCONTINUED | OUTPATIENT
Start: 2020-01-03 | End: 2020-01-03 | Stop reason: HOSPADM

## 2020-01-03 RX ORDER — PROMETHAZINE HYDROCHLORIDE 25 MG/ML
6.25 INJECTION, SOLUTION INTRAMUSCULAR; INTRAVENOUS EVERY 6 HOURS PRN
Status: DISCONTINUED | OUTPATIENT
Start: 2020-01-03 | End: 2020-01-03 | Stop reason: HOSPADM

## 2020-01-03 RX ORDER — ONDANSETRON 4 MG/1
4 TABLET, ORALLY DISINTEGRATING ORAL EVERY 8 HOURS PRN
Qty: 15 TABLET | Refills: 0 | Status: SHIPPED | OUTPATIENT
Start: 2020-01-03 | End: 2020-01-08

## 2020-01-03 RX ADMIN — METOPROLOL SUCCINATE 25 MG: 25 TABLET, FILM COATED, EXTENDED RELEASE ORAL at 12:09

## 2020-01-03 RX ADMIN — ISOSORBIDE MONONITRATE 30 MG: 30 TABLET ORAL at 09:22

## 2020-01-03 RX ADMIN — CLOPIDOGREL 75 MG: 75 TABLET, FILM COATED ORAL at 12:09

## 2020-01-03 RX ADMIN — BENZONATATE 100 MG: 100 CAPSULE ORAL at 01:04

## 2020-01-03 RX ADMIN — PROMETHAZINE HYDROCHLORIDE 6.25 MG: 25 INJECTION INTRAMUSCULAR; INTRAVENOUS at 09:22

## 2020-01-03 RX ADMIN — ACETAMINOPHEN 650 MG: 325 TABLET ORAL at 07:04

## 2020-01-03 RX ADMIN — SODIUM CHLORIDE: 9 INJECTION, SOLUTION INTRAVENOUS at 09:20

## 2020-01-03 RX ADMIN — ASPIRIN 81 MG: 81 TABLET, CHEWABLE ORAL at 12:09

## 2020-01-03 RX ADMIN — IBUPROFEN 400 MG: 400 TABLET, FILM COATED ORAL at 01:04

## 2020-01-03 RX ADMIN — Medication 10 ML: at 09:23

## 2020-01-03 ASSESSMENT — PAIN DESCRIPTION - DESCRIPTORS: DESCRIPTORS: HEADACHE

## 2020-01-03 ASSESSMENT — PAIN SCALES - GENERAL
PAINLEVEL_OUTOF10: 5
PAINLEVEL_OUTOF10: 4

## 2020-01-03 ASSESSMENT — PAIN DESCRIPTION - LOCATION: LOCATION: HEAD

## 2020-01-03 ASSESSMENT — PAIN DESCRIPTION - PAIN TYPE: TYPE: ACUTE PAIN

## 2020-01-03 NOTE — PROGRESS NOTES
[facility]  CDU / OBSERVATION eNCOUnter  Attending NOte       I performed a history and physical examination of the patient and discussed management with the resident. I reviewed the residents note and agree with the documented findings and plan of care. Any areas of disagreement are noted on the chart. I was personally present for the key portions of any procedures. I have documented in the chart those procedures where I was not present during the key portions. I have reviewed the nurses notes. I agree with the chief complaint, past medical history, past surgical history, allergies, medications, social and family history as documented unless otherwise noted below. The Family history, social history, and ROS are effectively unchanged since admission unless noted elsewhere in the chart. Pt with hx of CAD with cabg and stent placement in the past admitted for chest pain. Pt says he has been having chest pain for the past 2 days and it is worsening. He has had a productive cough for the past several days and some associated sob. He denies fever, chills, body aches. Workup in ED unremarkable. Cardiology has been consulted. Will await their recommendations.     Rolf Mendiola MD  Attending Emergency  Physician

## 2020-01-03 NOTE — CONSULTS
[facility] Cardiology Cardiology    Inpatient Consultation Note               Today's Date: 1/3/2020  Patient Name: Laurie Waterman  Date of admission: 1/2/2020 10:42 AM  Patient's age: 62 y.o., 1960  Admission Dx: Chest pain [R07.9]    Reason for  Consult:  Chest pain    Requesting Physician: Leonora Greenfield MD    CHIEF COMPLAINT:     Chief Complaint   Patient presents with    Chest Pain     Left side chest pain that radiates into left, upper back and shoulder since last night    Dizziness    Cough     Productive cough of yellow sputum since last night       History Obtained From:  patient, electronic medical record    HISTORY OF PRESENT ILLNESS:      The patient is a 62 y.o. male who is admitted to the hospital for chest pain. Wednesday night he started to get a scratchy throat and a cough. With the cough, he's having left sided sharp chest pain. The pain resolves when he's not coughing. He's also been feeling nauseous and short of breath since Wednesday night. He's had heart attacks in the past and states that this pain is different. In the ED, his troponins were negative times 2 and his ECG was not remarkable for acute ischemic changes. His cardiac history is significant for 2x CABG and 10 stents placed. He follows regularly with Dr. Luana Oh at [address] Past Medical History:   has a past medical history of Anxiety, Atherosclerosis of autologous vein coronary artery bypass graft, CAD (coronary artery disease), Chronic back pain, Congenital heart disease, Depression, Headache(784.0), History of colon polyps, Hx of heart artery stent, Hyperlipidemia, Hypertension, LONG TERM ANTICOAGULENT USE, MI (myocardial infarction) ([facility] [address] Osteoarthritis, Radicular syndrome of left leg, Restless legs syndrome, S/P CABG x 4, and Shingles outbreak. Past Surgical History:   has a past surgical history that includes Appendectomy; Coronary artery bypass graft (2008, 2009);  Upper gastrointestinal endoscopy; Cardiac catheterization; Cardiac catheterization; and Colonoscopy (N/A, 10/15/2019). Home Medications:    Prior to Admission medications    Medication Sig Start Date End Date Taking? Authorizing Provider   ALPRAZobhavna Cal Cowden) 0.5 MG tablet Take 0.5 tablets by mouth 2 times daily as needed for Sleep or Anxiety for up to 60 days.  12/17/19 2/15/20 Yes Jose Holland PA-C   clopidogrel (PLAVIX) 75 MG tablet TAKE 1 TABLET BY MOUTH DAILY 11/22/19  Yes Jose Holland PA-C   atorvastatin (LIPITOR) 40 MG tablet TAKE 1 TABLET BY MOUTH DAILY 11/22/19  Yes Jose Holland PA-C   bisacodyl (DULCOLAX) 5 MG EC tablet TAKE 2 TABS AT 9 AM DAY PRIOR TO COLONOSCOPY 10/8/19  Yes Digna Huggins MD   ibuprofen (ADVIL;MOTRIN) 800 MG tablet Take 1 tablet by mouth 2 times daily as needed for Pain 8/13/19  Yes Jose Holland PA-C   nitroGLYCERIN (NITROSTAT) 0.4 MG SL tablet Place 0.4 mg under the tongue 3/25/19  Yes Historical Provider, MD   isosorbide mononitrate (IMDUR) 30 MG extended release tablet Take 30 mg by mouth 5/2/19  Yes Historical Provider, MD   metoprolol succinate (TOPROL XL) 25 MG extended release tablet Take 25 mg by mouth daily 8/13/19 8/13/20 Yes Historical Provider, MD   aspirin (ASPIRIN CHILDRENS) 81 MG chewable tablet Take 1 tablet by mouth daily 10/5/18  Yes Donney All, DO        Current Facility-Administered Medications: nitroGLYCERIN (NITROSTAT) SL tablet 0.4 mg, 0.4 mg, Sublingual, Q5 Min PRN  ALPRAZolam (XANAX) tablet 0.25 mg, 0.25 mg, Oral, BID PRN  aspirin chewable tablet 81 mg, 81 mg, Oral, Daily  atorvastatin (LIPITOR) tablet 40 mg, 40 mg, Oral, Nightly  clopidogrel (PLAVIX) tablet 75 mg, 75 mg, Oral, Daily  isosorbide mononitrate (IMDUR) extended release tablet 30 mg, 30 mg, Oral, Daily  metoprolol succinate (TOPROL XL) extended release tablet 25 mg, 25 mg, Oral, Daily  nitroGLYCERIN (NITROSTAT) SL tablet 0.4 mg, 0.4 mg, Sublingual, Q5 Min PRN  sodium chloride flush 0.9 % injection Cardiovascular:  · Some left anterior chest tenderness with palpation  · Heart tones are crisp and normal. regular S1 and S2.  · The carotid upstroke is normal in amplitude and contour without delay or bruit  · Peripheral pulses are symmetrical and full   Abdomen:   · No masses or tenderness  · Bowel sounds present  Extremities:  ·  No Cyanosis or Clubbing  ·  Lower extremity edema: No  ·  Skin: Warm and dry  Neurological:  · Alert and oriented. · Moves all extremities well    DATA:    Diagnostics:    EKG: normal sinus rhythm. ECHO:  2/24/17: EF 55%, mild MR/TR, RVSP is 25 mmHg. Ejection fraction: 55%  Stress Test:  10/5/18: No fixed/reversible perfusion defect. EF 54%. Cardiac Angiography: 2/24/2017: Multi-vessel coronary artery disease. Normal LV contractility. Patent SVG's to LAD and OM, Unchanged angiography from previous. Labs:     CBC:   Recent Labs     01/02/20  1057   WBC 11.8*   HGB 14.9   HCT 44.3        BMP:   Recent Labs     01/02/20  1057      K 4.5   CO2 24   BUN 17   CREATININE 0.98   LABGLOM >60   GLUCOSE 118*     Pro-BNP:  No results for input(s): PROBNP in the last 72 hours. BNP: No results for input(s): BNP in the last 72 hours. PT/INR: No results for input(s): PROTIME, INR in the last 72 hours. APTT:No results for input(s): APTT in the last 72 hours. CARDIAC ENZYMES:No results for input(s): CKTOTAL, CKMB, CKMBINDEX, TROPONINI in the last 72 hours. Invalid input(s):  [address]   Recent Labs     01/02/20  1057 01/02/20  1208   TROPONINT NOT REPORTED NOT REPORTED       FASTING LIPID PANEL:  Lab Results   Component Value Date    HDL 37 02/24/2017    TRIG 70 02/24/2017     LIVER PROFILE:No results for input(s): AST, ALT, LABALBU in the last 72 hours. Patient's Active Problem List  Active Problems:    Chest pain  Resolved Problems:    * No resolved hospital problems.  *      IMPRESSION & RECOMMENDATIONS:  1. Non-cardiac chest pain; worse with coughing and deep
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
## 2020-01-03 NOTE — H&P
[facility]  CDU / [facility] NOTE     Pt Name: Marcos Miranda  MRN: [id number]  [place] 1960  Date of evaluation: 1/3/20  Patient's PCP is :  Suresh Valentine PA-C    [address] [place] [address]       Chief Complaint   Patient presents with    Chest Pain     Left side chest pain that radiates into left, upper back and shoulder since last night    Dizziness    Cough     Productive cough of yellow sputum since last night         HISTORY OF PRESENT ILLNESS    Marcos Miranda is a 62 y.o. male who presents with chest pain. Patient has a past medical history of 2 CABGs one in 2007 one in 2008, stent and cardiac cath in 2017, on Plavix, beta-blocker presents with chest pain. Patient states that chest pain started on 1/1 at 8 PM, that was left anterior chest wall pain and that it was worsening throughout the day. Patient has had associated nausea but no vomiting. Patient is also had a cough in the past few days, stating that is productive with yellow sputum. Patient denies any fever, chills, abdominal pain, headache, changes in vision. Patient states that the chest pain is 10 out of 10 and that it feels like his cardiac attacks in the past    Location/Symptom: Left-sided chest wall pain without radiation  Timing/Onset: Starting 2 days ago  Provocation: Unknown  Quality: Sharp stabbing  Radiation: Does not radiate  Severity: 10 out of 10  Timing/Duration: Constant  Modifying Factors:  Made worse with coughing    REVIEW OF SYSTEMS       General ROS - No fevers, No malaise   Ophthalmic ROS - No discharge, No changes in vision  ENT ROS -  No sore throat, No rhinorrhea,   Respiratory ROS - shortness of breath, cough, no  wheezing  Cardiovascular ROS - chest pain, no dyspnea on exertion  Gastrointestinal ROS - No abdominal pain, nausea no vomiting, no change in bowel habits, no black or bloody stools  Genito-Urinary ROS - No dysuria, trouble voiding, or hematuria  Musculoskeletal ROS - (ADVIL;MOTRIN) tablet 400 mg, Q6H PRN        All medication charted and reviewed. ALLERGIES     has No Known Allergies. FAMILY HISTORY     He indicated that his mother is alive. He indicated that his father is . family history includes Cancer in his father; Diabetes in his mother; Heart Disease in his father; Hypertension in his mother. The patient denies any pertinent family history. I have reviewed and agree with the family history entered. I have reviewed the Family History and it is not significant to the case    SOCIAL HISTORY      reports that he quit smoking about 20 years ago. His smoking use included cigarettes. He has a 20.00 pack-year smoking history. He has never used smokeless tobacco. He reports that he does not drink alcohol or use drugs. I have reviewed and agree with all Social.  There are no concerns for substance abuse/use. PHYSICAL EXAM     INITIAL VITALS:  height is 5' 5\" (1.651 m) and weight is 145 lb (65.8 kg). His oral temperature is 98.3 °F (36.8 °C). His blood pressure is 128/71 and his pulse is 62. His respiration is 20 and oxygen saturation is 96%.       CONSTITUTIONAL: AOx4, no apparent distress, appears stated age    HEAD: normocephalic, atraumatic   EYES: PERRLA, EOMI    ENT: moist mucous membranes, uvula midline   NECK: supple, symmetric   BACK: symmetric   LUNGS:  Some wheezing noted in the right lower lung field some tenderness to chest wall   CARDIOVASCULAR:  Is to palpation of the chest wall, regular rate and rhythm, no murmurs, rubs or gallops   ABDOMEN: soft, non-tender, non-distended with normal active bowel sounds   NEUROLOGIC:  MAEx4, no focal sensory or motor deficits   MUSCULOSKELETAL: no clubbing, cyanosis or edema left lower extremity tenderness   SKIN: no rash or wounds       DIFFERENTIAL DIAGNOSIS/MDM:   Acute coronary syndrome, aortic dissection, PE, pneumothorax, pneumonia, tamponade, myocardial event    DIAGNOSTIC RESULTS     EKG: All EKG's are interpreted by the Observation Physician who either signs or Co-signs this chart in the absence of a cardiologist.    EKG Interpretation    Interpreted by observation physician    Rhythm: normal sinus   Rate: normal  Axis: normal  Ectopy: none  Conduction: normal  ST Segments: no acute change  T Waves: no acute change  Q Waves: none    Clinical Impression: no acute changes    Elham Cortés MD        RADIOLOGY:   I directly visualized the following  images and reviewed the radiologist interpretations:    Xr Chest Standard (2 Vw)    Result Date: 1/2/2020  EXAMINATION: TWO XRAY VIEWS OF THE CHEST 1/2/2020 11:34 am COMPARISON: May 10, 2019 HISTORY: ORDERING SYSTEM PROVIDED HISTORY: chest pain, cough productive TECHNOLOGIST PROVIDED HISTORY: chest pain, cough productive Reason for Exam: chest pain, cough FINDINGS: The lungs are without acute focal process. There is no effusion or pneumothorax. The cardiomediastinal silhouette is without acute process. The osseous structures are without acute process. Coronary artery stents. Postoperative changes from CABG. No acute process. LABS:  I have reviewed and interpreted all available lab results.   Labs Reviewed   CBC WITH AUTO DIFFERENTIAL - Abnormal; Notable for the following components:       Result Value    WBC 11.8 (*)     Immature Granulocytes 1 (*)     Absolute Lymph # 3.81 (*)     All other components within normal limits   BASIC METABOLIC PANEL W/ REFLEX TO MG FOR LOW K - Abnormal; Notable for the following components:    Glucose 118 (*)     All other components within normal limits   TROPONIN   TROPONIN   D-DIMER, QUANTITATIVE   TROPONIN       SCREENING TOOLS:    HEART Risk Score for Chest Pain Patients   History and Physical Exam Suspicion Level  (Nausea, Vomiting, Diaphoresis, Radiation, Exertion)   Slightly Suspicious (0 pts)   Moderately Suspicious (1 pt)   Highly Suspicious (2 pts)   EKG Interpretation   Normal (0 pts)   Non-Specific Repolarization Disturbance (1 pt)   Significant ST-Depression (2 pts)   Age of Patient (in years)   = 39 (0 pts)   46-64 (1 pt)   = 65 (2 pts)   Risk Factors   No Risk Factors (0 pts)   1-2 Risk Factors (1 pt)   = 3 Risk Factors (2 pts)   Risk Factors Include:   Hypercholesterolemia   Hypertension   Diabetes Mellitus   Cigarette smoking   Positive family history   Obesity   CAD   (SLE, CKDz, HIV, Cocaine abuse)   Troponin Levels   = Normal Limit (0 pts)   1-3 Times Normal Limit (1 pt)   > 3 Times Normal Limit (2 pts)  TOTAL: 4     Percent Risk for Major Adverse Cardiac Event (MACE)  0-3 pts indicates low risk for MACE   2.5% (DISCHARGE)   4-7 pts indicates moderate risk for MACE  20.3% (OBS)  8-10 pts indicates high risk for MACE  72.7% (EARLY INVASIVE TX)    CDU ALBARO / Akanksha Ha is a 62 y.o. male who presents with     1.  aCute on chronic left-sided chest wall pain without radiation, unknown etiology, initial encounter  -Tylenol p.o., ibuprofen p.o., Tessalon Perles p.o., Xanax p.o., sublingual nitroglycerin, p.o. Zofran  -EKG, troponin in the emergency department and on the obs unit negative for acute pathology  -White Blood cell count noted to be 11.8  -Chest x-ray negative for acute pathology  -Cardiology consulted, appreciating recommendations    ·   · Continue home medications and pain control  · Monitor vitals, labs, and imaging  · DISPO: pending consults and clinical improvement    CONSULTS:    IP CONSULT TO CARDIOLOGY    PROCEDURES:  Not indicated       PATIENT REFERRED TO:    Suni Chavez PA-C  [facility] [id number]  [address] [place] [address] [address] [facility]  [address]  [phone]            --  Anuja Pacheco MD  Emergency Medicine Resident     This dictation was generated by voice recognition computer software.   Although all attempts are made to edit the dictation for accuracy, there may be errors in the

## 2020-01-04 NOTE — DISCHARGE SUMMARY
CDU Discharge Summary        Patient:  Rogelio Pratt  YOB: 1960    MRN: [id number]   Acct: [de-identified]    Primary Care Physician: Myrtle Boles PA-C    Admit date:  1/2/2020 1/2/2020 10:42 AM  Discharge date:  1/3/2020 1/3/2020  1:20 PM     Discharge Diagnoses:   Acute on chronic left-sided chest wall pain without radiation, unknown etiology, initial encounter, treated with p.o. Tylenol, p.o. ibuprofen, p.o. Tessalon Perles, p.o. Xanax, p.o. sublingual nitroglycerin, p.o. Zofran, cardiology consulted, state the patient was cleared for discharge, chest x-ray negative for acute pathology    Discharge Medications: [facility] Medication Instructions SQY:[id number]    Printed on:01/04/20 5023   Medication Information                      ALPRAZolam (XANAX) 0.5 MG tablet  Take 0.5 tablets by mouth 2 times daily as needed for Sleep or Anxiety for up to 60 days.              aspirin (ASPIRIN CHILDRENS) 81 MG chewable tablet  Take 1 tablet by mouth daily             atorvastatin (LIPITOR) 40 MG tablet  TAKE 1 TABLET BY MOUTH DAILY             bisacodyl (DULCOLAX) 5 MG EC tablet  TAKE 2 TABS AT 9 AM DAY PRIOR TO COLONOSCOPY             clopidogrel (PLAVIX) 75 MG tablet  TAKE 1 TABLET BY MOUTH DAILY             ibuprofen (ADVIL;MOTRIN) 800 MG tablet  Take 1 tablet by mouth 2 times daily as needed for Pain             isosorbide mononitrate (IMDUR) 30 MG extended release tablet  Take 30 mg by mouth             metoprolol succinate (TOPROL XL) 25 MG extended release tablet  Take 25 mg by mouth daily             nitroGLYCERIN (NITROSTAT) 0.4 MG SL tablet  Place 0.4 mg under the tongue             ondansetron (ZOFRAN ODT) 4 MG disintegrating tablet  Take 1 tablet by mouth every 8 hours as needed for Nausea or Vomiting                 Diet:  No diet orders on file     Activity:  As tolerated    Follow-up:  Call today/tomorrow for a follow up appointment with Myrtle Boles PA-C Consultants: IP CONSULT TO CARDIOLOGY    Procedures:      Diagnostic Test:         Physical Exam:    General appearance - NAD, AOx 3  Lungs -CTA Bilateraly  Heart - Normal S1/S2  Abdomen - Soft NT/ND  Neurological:  No focal motor or sensory weakness. Extremities - Cap refil <2 sec in all ext. Skin -warm, dry      Hospital Course:   Nilay Hernandez originally presented to the hospital on 1/2/2020 10:42 AM . Labs and imaging were followed daily. At time of discharge, Nilay Hernandez was tolerating a PO intake well, passing flatus, urinating adequately, ambulating and had adequate analgesia on oral pain medications. He is medically stable to be discharged. Clinical course has improved. I feel the patient can be safely discharged to home with outpatient follow up. Instructions have been given for the patient to return to the ED for any worsening of the symptoms, including but not limited to increased pain, shortness of breath, weakness, or any deterioration of their current condition . Disposition: Home    Condition: Good      Patient stable and ready for discharge home. I have discussed plan of care with patient and they are in understanding. They were instructed to read discharge paperwork. All of their questions and concerns were addressed.      Patient states that they understand the plan and agree with the plan      Time Spent: 0        Joann Harris MD  Emergency Medicine Resident Physician

## 2020-01-29 ENCOUNTER — CARE COORDINATION (OUTPATIENT)
Dept: CARE COORDINATION | Age: 60
End: 2020-01-29

## 2020-02-07 ENCOUNTER — CARE COORDINATION (OUTPATIENT)
Dept: CARE COORDINATION | Age: 60
End: 2020-02-07

## 2020-02-18 ENCOUNTER — OFFICE VISIT (OUTPATIENT)
Dept: FAMILY MEDICINE CLINIC | Age: 60
End: 2020-02-18
Payer: MEDICARE

## 2020-02-18 VITALS
OXYGEN SATURATION: 99 % | DIASTOLIC BLOOD PRESSURE: 76 MMHG | SYSTOLIC BLOOD PRESSURE: 136 MMHG | BODY MASS INDEX: 24.63 KG/M2 | HEART RATE: 54 BPM | TEMPERATURE: 98 F | WEIGHT: 148 LBS

## 2020-02-18 PROCEDURE — G8420 CALC BMI NORM PARAMETERS: HCPCS | Performed by: NURSE PRACTITIONER

## 2020-02-18 PROCEDURE — G8482 FLU IMMUNIZE ORDER/ADMIN: HCPCS | Performed by: NURSE PRACTITIONER

## 2020-02-18 PROCEDURE — G8427 DOCREV CUR MEDS BY ELIG CLIN: HCPCS | Performed by: NURSE PRACTITIONER

## 2020-02-18 PROCEDURE — 99213 OFFICE O/P EST LOW 20 MIN: CPT | Performed by: NURSE PRACTITIONER

## 2020-02-18 PROCEDURE — 3017F COLORECTAL CA SCREEN DOC REV: CPT | Performed by: NURSE PRACTITIONER

## 2020-02-18 PROCEDURE — 1036F TOBACCO NON-USER: CPT | Performed by: NURSE PRACTITIONER

## 2020-02-18 NOTE — PROGRESS NOTES
Conjunctiva/sclera: Conjunctivae normal.   Neck:      Musculoskeletal: Neck supple. Cardiovascular:      Rate and Rhythm: Normal rate and regular rhythm. Pulses: Normal pulses. Pulmonary:      Effort: Pulmonary effort is normal.   Musculoskeletal:         General: Tenderness and deformity present. No swelling or signs of injury. Right lower leg: No edema. Left lower leg: No edema. Right foot: Decreased range of motion. Deformity present. Feet:    Feet:      Right foot:      Skin integrity: Skin integrity normal.      Comments: Tenderness, deformity - 3rd and 4th digits in slightly flexed position, tender to touch at the PIP joints. Increased tenderness with extension. No redness, warmth, swelling or discoloration. No tenderness or deformity to the other digits. Appearance consistent with hammer toe. No tenderness to sole of foot, mild generalized tenderness following tendon from the toes across the top of the foot. No bony abnormalities  Skin:     General: Skin is warm and dry. Capillary Refill: Capillary refill takes less than 2 seconds. Findings: No bruising, erythema, lesion or rash. Comments: No rash, increased redness or warmth to visible skin   Neurological:      General: No focal deficit present. Mental Status: He is alert and oriented to person, place, and time. Psychiatric:         Mood and Affect: Mood normal.         Behavior: Behavior normal.       /76 (Site: Left Upper Arm, Position: Sitting, Cuff Size: Medium Adult)   Pulse 54   Temp 98 °F (36.7 °C) (Oral)   Wt 148 lb (67.1 kg)   SpO2 99%   BMI 24.63 kg/m²     Assessment:       Diagnosis Orders   1. Toe pain, right  XR FOOT RIGHT (MIN 3 VIEWS)    [address] [place] [address], ANTHONY [place], [place]   2.  Hammer toe of right foot  Erin Diop, ANTHONY, [place], [place]    3rd and 4th digits       Plan:    rt foot xray neg acuity  Refer podiatry for toe pain and deformity - 3rd and 4th digit, possible hammertoe  Wear good fitting shoes  Tylenol  Continue ace wrap if helps symptoms. Off at night, on during day  Return for make appt with Specialist - referral given. No orders of the defined types were placed in this encounter. Patient given educational materials - see patient instructions. Discussed use, benefit, and side effects of prescribed medications. All patient questions answered. Pt voicedunderstanding.     Electronically signed by CJ Larsen CNP on 2/18/2020 at 9:59 AM

## 2020-02-18 NOTE — PATIENT INSTRUCTIONS
[email or web address] and sign in to your [employer] account. Enter H532 in the [employer] box to learn more about \"Hammer Toe: Care Instructions. \"     If you do not have an account, please click on the \"Sign Up Now\" link. Current as of: June 26, 2019  Content Version: 12.3  © 6932-6130 [employer]. Care instructions adapted under license by [facility] ([facility]). If you have questions about a medical condition or this instruction, always ask your healthcare professional. [address] any warranty or liability for your use of this information. Patient Education        Foot Pain: Care Instructions  Your Care Instructions  Foot injuries that cause pain and swelling are fairly common. Almost all sports or home repair projects can cause a misstep that ends up as foot pain. Normal wear and tear, especially as you get older, also can cause foot pain. Most minor foot injuries will heal on their own, and home treatment is usually all you need to do. If you have a severe injury, you may need tests and treatment. Follow-up care is a key part of your treatment and safety. Be sure to make and go to all appointments, and call your doctor if you are having problems. It's also a good idea to know your test results and keep a list of the medicines you take. How can you care for yourself at home? · Take pain medicines exactly as directed. ? If the doctor gave you a prescription medicine for pain, take it as prescribed. ? If you are not taking a prescription pain medicine, ask your doctor if you can take an over-the-counter medicine. · Rest and protect your foot. Take a break from any activity that may cause pain. · Put ice or a cold pack on your foot for 10 to 20 minutes at a time. Put a thin cloth between the ice and your skin. · Prop up the sore foot on a pillow when you ice it or anytime you sit or lie down during the next 3 days.  Try to keep it above the

## 2020-02-24 ENCOUNTER — CARE COORDINATION (OUTPATIENT)
Dept: CARE COORDINATION | Age: 60
End: 2020-02-24

## 2020-02-27 ENCOUNTER — OFFICE VISIT (OUTPATIENT)
Dept: PODIATRY | Age: 60
End: 2020-02-27
Payer: MEDICARE

## 2020-02-27 VITALS — HEIGHT: 65 IN | WEIGHT: 145 LBS | BODY MASS INDEX: 24.16 KG/M2

## 2020-02-27 PROCEDURE — 1036F TOBACCO NON-USER: CPT | Performed by: PODIATRIST

## 2020-02-27 PROCEDURE — G8420 CALC BMI NORM PARAMETERS: HCPCS | Performed by: PODIATRIST

## 2020-02-27 PROCEDURE — 64455 NJX AA&/STRD PLTR COM DG NRV: CPT | Performed by: PODIATRIST

## 2020-02-27 PROCEDURE — G8427 DOCREV CUR MEDS BY ELIG CLIN: HCPCS | Performed by: PODIATRIST

## 2020-02-27 PROCEDURE — 99203 OFFICE O/P NEW LOW 30 MIN: CPT | Performed by: PODIATRIST

## 2020-02-27 PROCEDURE — G8482 FLU IMMUNIZE ORDER/ADMIN: HCPCS | Performed by: PODIATRIST

## 2020-02-27 PROCEDURE — 3017F COLORECTAL CA SCREEN DOC REV: CPT | Performed by: PODIATRIST

## 2020-02-27 RX ORDER — BUPIVACAINE HYDROCHLORIDE 5 MG/ML
1 INJECTION, SOLUTION PERINEURAL ONCE
Status: COMPLETED | OUTPATIENT
Start: 2020-02-27 | End: 2020-02-27

## 2020-02-27 RX ADMIN — BUPIVACAINE HYDROCHLORIDE 5 MG: 5 INJECTION, SOLUTION PERINEURAL at 13:30

## 2020-02-27 ASSESSMENT — ENCOUNTER SYMPTOMS
DIARRHEA: 0
COLOR CHANGE: 0
SHORTNESS OF BREATH: 0
BACK PAIN: 0
NAUSEA: 0

## 2020-02-27 NOTE — PROGRESS NOTES
Oswaldo Moran is a 62 y.o. male who presents to the office today with chief complaint of pain to the toes 3,4,& 5 of the right foot. Chief Complaint   Patient presents with    Toe Pain     right toes are painful, pain shoots up the foot     Other     symptoms for a month, xrays on 2/18   Symptoms began about 1 month(s) ago. Patient denies injury to the right foot. Patient states that the pain is greatest when walking. Pain is rated 8 out of 10 at it's worst and is described as intermittent. Treatments prior to today's visit include: Patient had xrays taken from urgent care, but no treatment was offered. Patient referred to my office for care. No Known Allergies    Past Medical History:   Diagnosis Date    Anxiety     Atherosclerosis of autologous vein coronary artery bypass graft 3/29/2007    CAD (coronary artery disease)     Chronic back pain     Congenital heart disease     Depression     Headache(784.0)     History of colon polyps 10/15/2019    tubular adenoma x1    Hx of heart artery stent     pt states he has heart stents x 10    Hyperlipidemia 5/30/2014    Hypertension     LONG TERM ANTICOAGULENT USE     MI (myocardial infarction) ([facility] [address] 2007    Osteoarthritis     Radicular syndrome of left leg 11/15/2016    Restless legs syndrome     S/P CABG x 4     Shingles outbreak 02/10/2017    in left eye        Prior to Admission medications    Medication Sig Start Date End Date Taking?  Authorizing Provider   clopidogrel (PLAVIX) 75 MG tablet TAKE 1 TABLET BY MOUTH DAILY 11/22/19  Yes Peyman Coy PA-C   atorvastatin (LIPITOR) 40 MG tablet TAKE 1 TABLET BY MOUTH DAILY 11/22/19  Yes Peyman Coy PA-C   bisacodyl (DULCOLAX) 5 MG EC tablet TAKE 2 TABS AT 9 AM DAY PRIOR TO COLONOSCOPY 10/8/19  Yes Saravanan Hernandez MD   ibuprofen (ADVIL;MOTRIN) 800 MG tablet Take 1 tablet by mouth 2 times daily as needed for Pain 8/13/19  Yes Peyman Coy PA-C   nitroGLYCERIN (NITROSTAT) 0.4 MG numbness. Hematological: Does not bruise/bleed easily. Psychiatric/Behavioral: Negative for behavioral problems, confusion and self-injury. The patient is not nervous/anxious. Vitals: There were no vitals filed for this visit. General: AAO x 3 in NAD. Integument: There are no rashes, ulcers, or breaks in the skin noted to the bilateral lower extremities. There is no induration, subcutaneous nodules, or tightening of the skin noted to the bilateral.     Toenails 1-5 of the right foot do not present with thickness, elongation, discoloration, brittleness, subungual debris. Toenails 1-5 of the left foot do not present with thickness, elongation, discoloration, brittleness, subungual debris. Interdigital maceration absent to web spaces 1-4, Bilateral.     There are no preulcerative lesions noted to the right foot. There are no preulcerative lesions noted to the left foot. The skin to the bilateral feet is not thin and shiny. The skin to the bilateral feet is  warm, supple, and dry. Vascular: DP pulse of the right foot is  palpable. DP pulse of the left foot is  palpable. PT pulse of the right foot is  palpable. PT pulse of the left foot is  palpable. CFT is less than 3 secs to the digits of the right foot. CFT is less than 3 secs to the digits of the left foot. There is edema noted to the right forefoot. There is hair growth noted to the digits of the bilateral feet. There are no varicosities noted to the right foot/ankle. There are no varicosities noted to the left foot/ankle. Erythema is absent to the bilateral feet. Neurological: Reflexes are present to the right plantar foot and to the Achilles tendon. Reflexes are present to the left plantar foot and to the Achilles tendon. Epicritic sensation is  intact to the right foot. Epicritic sensation is  intact to the left foot.     Musculoskeletal:  Muscle strength is +5/5 to all four muscle groups of the right lower extremity and +5/5 to all four muscle groups of the left lower extremity. There are no areas of subluxation, dislocation, or laxity noted to either lower extremity. Range of motion to the right ankle is  free of pain or grinding. Range of motion to the left ankle is  free of pain or grinding. Range of motion to the right subtalar joint is  free of pain or grinding. Range of motion to the left subtalar joint is  free of pain or grinding. No abnormalities, asymmetries, or misalignments are seen between the extremities. Weightbearing evaluation does not reveal rearfoot eversion, medial prominence of the talar head, loss of the medial longitudinal arch height, and too many toes sign bilaterally. The digits of the right foot are not contracted. The digits of the left foot are not contracted. There is no prominence noted to the first metatarsal head without abduction of the hallux of the right foot. There is no prominence noted to the first metatarsal head without abduction of the hallux of the left foot. There is pain with palpation to the second and third intermetatarsal spaces of the right foot. This pain is greater to the third intermetatarsal space as compared to the second. There is no palpable click noted to the area. There is no divergence noted to the third and fourth toes, nor to the second and third toes. There is some pain to the second, third, and fourth MTPJ's, but this pain is mild. Shoe examination was performed. Biomechanical Exam: normal bilaterally. X-ray's reviewed from the hospital: AP, Lateral, and Medial Oblique of the right foot. Findings: There is no evidence of fracture or stress fracture noted. Asessment: Patient is a 62 y.o. male with:    Diagnosis Orders   1.  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 Pain in right foot  bupivacaine (MARCAINE) 0.5 % injection 5 mg    methylPREDNISolone acetate SUSP 10 mg    AZ INJECT PLANTAR COMMON DIGITAL NERVE       Plan:  1. Clinical evaluation of the patient. 2. I recommended a steroid injection to the Right second intermetatarsal space. Verbal consent was obtained from the patient after all questions answered. The Right foot was prepped with an alcohol swab. The injection was placed at the most tender area using a  1.0 cc total of a combination of 0.5 cc of 0.5% Marcaine plain and 0.5 cc of Depomedrol 20 mg/ml. A band-aid was applied, patient advised of possible local steroid reaction symptoms, and patient instructed to limit activities to this area for 24 hours. Patient encouraged to ice the right foot daily. 3. Contact office with any questions/problems/concerns. Return in about 2 weeks (around 3/12/2020) for neuroma third interspace right foot.    2/27/2020      Lyudmila Del Angel DPM

## 2020-03-02 ENCOUNTER — CARE COORDINATION (OUTPATIENT)
Dept: CARE COORDINATION | Age: 60
End: 2020-03-02

## 2020-03-09 RX ORDER — ATORVASTATIN CALCIUM 40 MG/1
40 TABLET, FILM COATED ORAL DAILY
Qty: 90 TABLET | Refills: 0 | Status: ON HOLD | OUTPATIENT
Start: 2020-03-09 | End: 2021-01-11

## 2020-03-09 RX ORDER — CLOPIDOGREL BISULFATE 75 MG/1
75 TABLET ORAL DAILY
Qty: 90 TABLET | Refills: 0 | Status: SHIPPED | OUTPATIENT
Start: 2020-03-09 | End: 2021-02-12 | Stop reason: SDUPTHER

## 2020-05-21 RX ORDER — ALPRAZOLAM 0.5 MG/1
TABLET ORAL
Qty: 30 TABLET | Refills: 0 | Status: SHIPPED | OUTPATIENT
Start: 2020-05-21 | End: 2020-09-29 | Stop reason: SDUPTHER

## 2020-09-29 ENCOUNTER — VIRTUAL VISIT (OUTPATIENT)
Dept: PRIMARY CARE CLINIC | Age: 60
End: 2020-09-29
Payer: MEDICARE

## 2020-09-29 PROCEDURE — 3017F COLORECTAL CA SCREEN DOC REV: CPT | Performed by: PHYSICIAN ASSISTANT

## 2020-09-29 PROCEDURE — G8427 DOCREV CUR MEDS BY ELIG CLIN: HCPCS | Performed by: PHYSICIAN ASSISTANT

## 2020-09-29 PROCEDURE — 99214 OFFICE O/P EST MOD 30 MIN: CPT | Performed by: PHYSICIAN ASSISTANT

## 2020-09-29 RX ORDER — PREDNISONE 20 MG/1
20 TABLET ORAL 2 TIMES DAILY
Qty: 10 TABLET | Refills: 0 | Status: SHIPPED | OUTPATIENT
Start: 2020-09-29 | End: 2020-10-04

## 2020-09-29 RX ORDER — ALPRAZOLAM 0.5 MG/1
0.5 TABLET ORAL DAILY PRN
Qty: 30 TABLET | Refills: 0 | Status: SHIPPED | OUTPATIENT
Start: 2020-09-29 | End: 2021-03-27

## 2020-09-29 RX ORDER — MELOXICAM 7.5 MG/1
7.5 TABLET ORAL 2 TIMES DAILY WITH MEALS
Qty: 14 TABLET | Refills: 0 | Status: SHIPPED | OUTPATIENT
Start: 2020-09-29 | End: 2020-10-02 | Stop reason: SDUPTHER

## 2020-09-29 ASSESSMENT — ENCOUNTER SYMPTOMS: SHORTNESS OF BREATH: 1

## 2020-09-29 NOTE — PATIENT INSTRUCTIONS
· Get x-rays completed  · Get labs completed before follow-up appointment with PCP  · Hold ibuprofen, take meloxicam for the next 7 days, contact PCP office to update on symptoms  · Take prednisone twice daily for the next 5 days          CHECK OUT CALL NEEDED  SCHEDULE FOR APPT IN 2 MONTHS

## 2020-09-29 NOTE — PROGRESS NOTES
Visit Information    Have you changed or started any medications since your last visit including any over-the-counter medicines, vitamins, or herbal medicines? no   Are you having any side effects from any of your medications? -  no  Have you stopped taking any of your medications? Is so, why? -  no    Have you seen any other physician or provider since your last visit? No  Have you had any other diagnostic tests since your last visit? No  Have you been seen in the emergency room and/or had an admission to a hospital since we last saw you? No  Have you had your routine dental cleaning in the past 6 months? no    Have you activated your [employer] account? If not, what are your barriers?  Yes     Patient Care Team:  Abdoul Mendiola PA-C as PCP - [facility]  Abdoul Mendiola PA-C as PCP - [facility]  Kraig Link MD as Consulting Physician (Gastroenterology)  Reza Cisneros MD as Consulting Physician (Cardiology)    Medical History Review  Past Medical, Family, and Social History reviewed and does contribute to the patient presenting condition    Health Maintenance   Topic Date Due    Shingles Vaccine (1 of 2) 05/11/2008    Lipid screen  02/24/2018    A1C test (Diabetic or Prediabetic)  01/23/2019    Annual Wellness Visit (AWV)  06/19/2019    Flu vaccine (1) 09/01/2020    Statin Therapy  03/09/2021    Colon cancer screen colonoscopy  10/15/2024    DTaP/Tdap/Td vaccine (2 - Td) 04/04/2027    Hepatitis C screen  Addressed    HIV screen  Addressed    Hepatitis A vaccine  Aged Out    Hepatitis B vaccine  Aged Out    Hib vaccine  Aged Out    Meningococcal (ACWY) vaccine  Aged Out    Pneumococcal 0-64 years Vaccine  Aged Out

## 2020-09-29 NOTE — PROGRESS NOTES
Palma Ortega is a 56 y.o. male evaluated virtual visit via [employer] video on 2020. Consent:  He and/or health care decision maker is aware that that he may receive a bill for this telephone service, depending on his insurance coverage, and has provided verbal consent to proceed: Yes      Documentation:  I communicated with the patient and/or health care decision maker about elbow pain, left knee pain, anxiety. Details of this discussion including any medical advice provided below      I affirm this is a Patient Initiated Episode with an Established Patient who has not had a related appointment within my department in the past 7 days or scheduled within the next 24 hours. Total Time: minutes: 21-30 minutes    Note: not billable if this call serves to triage the patient into an appointment for the relevant concern      Abdoulnury Mendiola                  2020    TELEHEALTH EVALUATION -- Audio/Visual (During [facility] public health emergency)    Patient and physician are located in their individual homes    HPI:    Palma Ortega (:  1960) has requested an audio only/video evaluation for the following concern(s):    Patient is here for virtual visit via video for follow-up for anxiety, complaining of left knee, right elbow pain. Patient states he is compliant with medications. Knee Pain    There was no injury mechanism. The pain is present in the left knee. The quality of the pain is described as shooting. The pain is at a severity of 8/10. The pain is moderate. The pain has been constant since onset. Associated symptoms include muscle weakness. Pertinent negatives include no inability to bear weight, loss of motion, loss of sensation, numbness or tingling. He reports no foreign bodies present. The symptoms are aggravated by movement. He has tried NSAIDs for the symptoms. The treatment provided moderate relief.      Patient had bilateral knee x-ray in  and left knee x-ray in 2017 which were both unremarkable. Patient states pain is chronic, \"worsened over the last year\". Patient has benefited from OTC NSAIDs. Informed patient will order left knee x-ray to further evaluate with possible referral to orthopedic pending x-ray results, patient voiced understanding. Arm Pain    There was no injury mechanism. The pain is present in the right elbow. The quality of the pain is described as aching. The pain radiates to the right arm. The pain is at a severity of 7/10. The pain is moderate. The pain has been intermittent since the incident. Associated symptoms include muscle weakness. Pertinent negatives include no chest pain, numbness or tingling. Nothing aggravates the symptoms. He has tried NSAIDs for the symptoms. The treatment provided mild relief. Patient states only injury to her right arm was \"history of a boxer's fracture 20 years ago\". Patient states he has pain \"along the forearm\". PCP is concerned with epicondylitis d/t mild relief with NSAID. Informed patient he will be prescribed oral steroid to take for the next 5 days, instructed patient to hold OTC NSAID, will prescribe another NSAID, meloxicam, to take for the next 7 days to see if better relief is obtained, informed patient we will x-ray right elbow with possible orthopedic referral pending x-ray results, patient voiced understanding. Discussed anxiety in depth with patient, stable, patient is requesting benzodiazepine medication refill. Drug screen via blood will be order. Labs reviewed, informed patient labs will be ordered and to have completed before follow-up appointment, patient voiced understanding. Health maintenance reviewed. OARRS reviewed, prescribed Xanax 0.5 mg daily PRN for 90 days. Medications reviewed, prescribed Mobic 7.5 mg twice daily for 7 days, prednisone 20 mg twice daily for 5 days. HPI    Review of Systems   Constitutional: Negative for chills and fever.    HENT: Negative for  Smokeless tobacco: Never Used   Substance Use Topics    Alcohol use: No    Drug use: No        Past Medical History:   Diagnosis Date    Anxiety     Atherosclerosis of autologous vein coronary artery bypass graft 3/29/2007    CAD (coronary artery disease)     Chronic back pain     Congenital heart disease     Depression     Headache(784.0)     History of colon polyps 10/15/2019    tubular adenoma x1    Hx of heart artery stent     pt states he has heart stents x 10    Hyperlipidemia 5/30/2014    Hypertension     LONG TERM ANTICOAGULENT USE     MI (myocardial infarction) ([facility] [address] 2007    Osteoarthritis     Radicular syndrome of left leg 11/15/2016    Restless legs syndrome     S/P CABG x 4     Shingles outbreak 02/10/2017    in left eye               RECORD REVIEW: Previous medical records were reviewed at today's visit. PHYSICAL EXAMINATION:    Vital Signs: (As obtained by patient/caregiver at home)    No flowsheet data found. Physical Exam  Constitutional:       General: He is awake. Appearance: Normal appearance. He is well-developed and well-groomed. HENT:      Head: Normocephalic and atraumatic. Right Ear: Hearing and external ear normal.      Left Ear: Hearing and external ear normal.      Nose: Nose normal.      Mouth/Throat:      Lips: Pink. Eyes:      General: Lids are normal.      Conjunctiva/sclera: Conjunctivae normal.   Neck:      Musculoskeletal: Neck supple. Pulmonary:      Effort: Pulmonary effort is normal.   Musculoskeletal:         General: No deformity or signs of injury. Neurological:      Mental Status: He is alert and oriented to person, place, and time. Psychiatric:         Attention and Perception: Attention and perception normal.         Mood and Affect: Mood normal.         Speech: Speech normal.         Behavior: Behavior is cooperative. Thought Content:  Thought content normal.         Cognition and Memory: Cognition normal. Judgment: Judgment normal.           Other pertinent observable physical exam findings:-     RECORD REVIEW: Previous medical records were reviewed at today's visit. The past family, medical and social histories were reviewed and unchanged with the exceptions of what is mentioned in this note. Due to this being a TeleHealth encounter, evaluation of the following organ systems is limited: Vitals/Constitutional/EENT/Resp/CV/GI//MS/Neuro/Skin/Heme-Lymph-Imm. ASSESSMENT/PLAN:  Cricket Ahn was seen today for knee pain and elbow pain. Diagnoses and all orders for this visit:    Right elbow pain  -     XR ELBOW RIGHT (MIN 3 VIEWS); Future  -     Discontinue: meloxicam (MOBIC) 7.5 MG tablet; Take 1 tablet by mouth 2 times daily (with meals) for 7 days  -     predniSONE (DELTASONE) 20 MG tablet; Take 1 tablet by mouth 2 times daily for 5 days    Chronic pain of left knee  -     XR KNEE LEFT (3 VIEWS); Future  -     XR ELBOW RIGHT (MIN 3 VIEWS); Future  -     Discontinue: meloxicam (MOBIC) 7.5 MG tablet; Take 1 tablet by mouth 2 times daily (with meals) for 7 days    Anxiety  -     ALPRAZolam (XANAX) 0.5 MG tablet; Take 1 tablet by mouth daily as needed for Anxiety for up to 90 days.  -     Drugs of abuse 9 serum w/ reflex; Future    Mixed hyperlipidemia  -     Lipid Panel; Future    Opioid withdrawal ([facility] [address]  -     Hepatic Function Panel; Future    Encounter for screening for malignant neoplasm of prostate  -     Psa screening; Future        Return in about 2 months (around 11/29/2020) for Knee Pain, Arm pain, Imaging Review, Lab Review. An  electronic signature was used to authenticate this note.     --Davy Montanez PA-C on 10/3/2020 at 1:51 PM    This note is created with the assistance of a speech-recognition program. While intending to generate a document that actually reflects the content of the visit, the document can still have some mistakes which may not have been identified and corrected by editing. 9    Pursuant to the emergency declaration under the [facility], [facility] waiver authority and the [employer] Act, this Virtual  Visit was conducted, with patient's consent, to reduce the patient's risk of exposure to COVID-19 and provide continuity of care for an established patient. Services were provided through a video synchronous discussion virtually to substitute for in-person clinic visit.

## 2020-09-30 ENCOUNTER — HOSPITAL ENCOUNTER (OUTPATIENT)
Age: 60
Discharge: HOME | End: 2020-10-02
Payer: MEDICARE

## 2020-09-30 ENCOUNTER — HOSPITAL ENCOUNTER (OUTPATIENT)
Dept: GENERAL RADIOLOGY | Age: 60
Discharge: HOME | End: 2020-10-02
Payer: MEDICARE

## 2020-09-30 PROCEDURE — 73562 X-RAY EXAM OF KNEE 3: CPT

## 2020-09-30 PROCEDURE — 73080 X-RAY EXAM OF ELBOW: CPT

## 2020-10-02 ENCOUNTER — TELEPHONE (OUTPATIENT)
Dept: PRIMARY CARE CLINIC | Age: 60
End: 2020-10-02

## 2020-10-02 RX ORDER — MELOXICAM 7.5 MG/1
7.5 TABLET ORAL 2 TIMES DAILY WITH MEALS
Qty: 60 TABLET | Refills: 1 | Status: SHIPPED | OUTPATIENT
Start: 2020-10-02 | End: 2021-02-12

## 2020-10-02 NOTE — TELEPHONE ENCOUNTER
Patient confirmed benefit from meloxicam, will refill. Patient request to be seen by orthopedic near [facility], referral ordered. Will recommend OTC calcium/vitamin D supplement to help improve osteopenia.

## 2020-10-02 NOTE — TELEPHONE ENCOUNTER
Patient had x-rays completed, left knee x-ray showed mild osteopenia, otherwise unremarkable. Right elbow x-ray showed moderate olecranon spurring. Will confirm if patient takes OTC vitamin D/calcium, if not will prescribe or recommend. Will refer patient to orthopedic for evaluation for elbow concerns.   Will confirm if symptoms improve with NSAID that was prescribed, meloxicam.

## 2020-10-03 PROBLEM — F43.0 ANXIETY AS ACUTE REACTION TO EXCEPTIONAL STRESS: Status: RESOLVED | Noted: 2017-07-13 | Resolved: 2020-10-03
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
## 2020-10-03 PROBLEM — F41.1 ANXIETY AS ACUTE REACTION TO EXCEPTIONAL STRESS: Status: RESOLVED | Noted: 2017-07-13 | Resolved: 2020-10-03

## 2020-10-03 PROBLEM — I30.0 ACUTE NONSPECIFIC IDIOPATHIC PERICARDITIS: Status: RESOLVED | Noted: 2018-01-23 | Resolved: 2020-10-03

## 2020-10-03 PROBLEM — R07.9 CHEST PAIN: Status: RESOLVED | Noted: 2020-01-02 | Resolved: 2020-10-03

## 2020-10-03 ASSESSMENT — ENCOUNTER SYMPTOMS
COUGH: 0
NAUSEA: 0
DIARRHEA: 0
WHEEZING: 0
VOMITING: 0
BACK PAIN: 0
CONSTIPATION: 0

## 2020-10-07 ENCOUNTER — OFFICE VISIT (OUTPATIENT)
Dept: ORTHOPEDIC SURGERY | Age: 60
End: 2020-10-07
Payer: MEDICARE

## 2020-10-07 VITALS — TEMPERATURE: 98.2 F

## 2020-10-07 PROCEDURE — G8427 DOCREV CUR MEDS BY ELIG CLIN: HCPCS | Performed by: PHYSICIAN ASSISTANT

## 2020-10-07 PROCEDURE — 1036F TOBACCO NON-USER: CPT | Performed by: PHYSICIAN ASSISTANT

## 2020-10-07 PROCEDURE — G8484 FLU IMMUNIZE NO ADMIN: HCPCS | Performed by: PHYSICIAN ASSISTANT

## 2020-10-07 PROCEDURE — 3017F COLORECTAL CA SCREEN DOC REV: CPT | Performed by: PHYSICIAN ASSISTANT

## 2020-10-07 PROCEDURE — G8420 CALC BMI NORM PARAMETERS: HCPCS | Performed by: PHYSICIAN ASSISTANT

## 2020-10-07 PROCEDURE — 99213 OFFICE O/P EST LOW 20 MIN: CPT | Performed by: PHYSICIAN ASSISTANT

## 2020-10-07 NOTE — PROGRESS NOTES
Orthopedic Elbow Encounter Note     Chief complaint: Right elbow pain    HPI: Ryan Bonilla is a 56 y.o. right-hand dominant male who presents for evaluation of right elbow pain. Patient states he has had this pain for nearly a decade now however it has worsened over the last few months. Pain is most severe to the posterior aspect of the right elbow and is aggravated by lifting, pushing off when getting up as well as repetitive use of this arm. Associated with intermittent numbness and tingling in the fourth and fifth digits as well as at the medial forearm. Patient denies any swelling of the elbow, redness, warmth, bruising or fever chills. He denies any recent injury or trauma to this elbow. Patient was recently started on Mobic 7.5 mg twice daily by his PCP approximately 4 days ago he states he has noticed quite a bit of relief in his chronic knee pain however not much relief in his elbow at this point. Previous treatment:    NSAIDs: Mobic 7.5 mg BID    Injections:  None    Physical therapy: None for the elbow    Surgeries: None    Review of Systems:     Constitution: no fever or chills   Pain level: 7/10  Musculoskeletal: As noted in the HPI   Neurologic: no neurologic symptoms    Past Medical Hx, Past Surgical Hx, Medications, Allergies, Social Hx: These were all reviewed. Please refer to Electronic Medical Records for details.      Physical Exam:     Temp 98.2 °F (36.8 °C)    General Appearance: alert, well appearing, and in no distress  Mental Status: alert, oriented to person, place, and time  Gait: normal  Head and neck: Normal   Thoracic spine: Normal    Elbows:    Skin: warm and dry, no rash or erythema  Vasculature: 2+ radial pulses bilaterally  Sensation: Intact to light touch in radial, ulnar, and median nerve distributions bilaterally    ROM: (Degrees)    Right   A  Left   A     Flexion   120  Flexion   130   Extension  5  Extension  0 Pronation  80  Pronaton  80   Supination  80  Supination  80     Crepitation  Yes  Crepitation  No    Muscle strength:    Right       Left    Biceps   5    Biceps   5  Triceps  4    Triceps  5  Wrist flexion  5    Wrist flexion  5  Wrist extension 5    Wrist extension 5  Intrinsics  5    Intrinsics  5    Tenderness: Focal tenderness over the olecranon process as well as the distal triceps tendon. Tenderness: None    Special tests    Right    Ulnar Nerve     Left  y    Cubital tunnel bend    n  n    Tinnel's at elbow    n        Biceps  n    Bicipital tenderness    n  n    Defect at biceps insertion   n        Instability  n    LCL instability     n  n    MCL instability     n  n    Moving valgus test    n  n    Milk sign     n  n    PLRI pivot     n        Epicondylitis  y    Resisted WE Pain    n  n    Resisted WF Pain    n  n    Resisted Supination Pain   n  n    Resisted Pronation Pain   n        Arthritis  n    Clunk      n  y    Pain at extremes of motion   n  y    Pain during arc of motion   n      Imaging:  Previous Imagin2020 XR right elbow  FINDINGS:    There is moderate spurring of the olecranon process         There is no definite effusion, fracture, dislocation, abnormal soft tissue    calcification, radiopaque foreign body or bony destructive lesion              Impression    Moderate olecranon spurring          Impression/Plan:     Jonathan Puente is a 56 y.o. old male presents for evaluation of chronic right elbow pain consistent with triceps tendinitis likely related to his olecranon spurring. Patient also has symptoms consistent with ulnar neuropathy. Triceps Tendonitis/enthesophytes  1. Referral to physical therapy given for elbow range of motion and strengthening  2. Continue Mobic 7.5 mg BID    Possible Ulnar neuropathy  1. EMG to evaluate for neuropathy  2.   Patient is educated on activity modifications that he can perform to avoid aggravation of this including avoiding

## 2021-01-09 ENCOUNTER — APPOINTMENT (OUTPATIENT)
Dept: CT IMAGING | Age: 61
DRG: 287 | End: 2021-01-09
Payer: MEDICARE

## 2021-01-09 ENCOUNTER — APPOINTMENT (OUTPATIENT)
Dept: GENERAL RADIOLOGY | Age: 61
DRG: 287 | End: 2021-01-09
Payer: MEDICARE

## 2021-01-09 ENCOUNTER — HOSPITAL ENCOUNTER (INPATIENT)
Age: 61
LOS: 1 days | Discharge: HOME | DRG: 287 | End: 2021-01-11
Attending: EMERGENCY MEDICINE | Admitting: EMERGENCY MEDICINE
Payer: MEDICARE

## 2021-01-09 DIAGNOSIS — M54.12 RADICULOPATHY, CERVICAL: ICD-10-CM

## 2021-01-09 DIAGNOSIS — R07.89 OTHER CHEST PAIN: Primary | ICD-10-CM

## 2021-01-09 PROBLEM — R07.9 CHEST PAIN: Status: ACTIVE | Noted: 2021-01-09

## 2021-01-09 LAB
ABSOLUTE EOS #: 0.07 K/UL (ref 0–0.44)
ABSOLUTE IMMATURE GRANULOCYTE: 0.04 K/UL (ref 0–0.3)
ABSOLUTE LYMPH #: 3.56 K/UL (ref 1.1–3.7)
ABSOLUTE MONO #: 1.05 K/UL (ref 0.1–1.2)
ALBUMIN SERPL-MCNC: 3.9 G/DL (ref 3.5–5.2)
ALBUMIN/GLOBULIN RATIO: 1.8 (ref 1–2.5)
ALP BLD-CCNC: 48 U/L (ref 40–129)
ALT SERPL-CCNC: 15 U/L (ref 5–41)
ANION GAP SERPL CALCULATED.3IONS-SCNC: 12 MMOL/L (ref 9–17)
AST SERPL-CCNC: 17 U/L
BASOPHILS # BLD: 1 % (ref 0–2)
BASOPHILS ABSOLUTE: 0.05 K/UL (ref 0–0.2)
BILIRUB SERPL-MCNC: 0.59 MG/DL (ref 0.3–1.2)
BNP INTERPRETATION: NORMAL
BUN BLDV-MCNC: 14 MG/DL (ref 8–23)
BUN/CREAT BLD: ABNORMAL (ref 9–20)
CALCIUM SERPL-MCNC: 8.9 MG/DL (ref 8.6–10.4)
CHLORIDE BLD-SCNC: 103 MMOL/L (ref 98–107)
CO2: 21 MMOL/L (ref 20–31)
CREAT SERPL-MCNC: 0.83 MG/DL (ref 0.7–1.2)
DIFFERENTIAL TYPE: ABNORMAL
EOSINOPHILS RELATIVE PERCENT: 1 % (ref 1–4)
GFR AFRICAN AMERICAN: >60 ML/MIN
GFR NON-AFRICAN AMERICAN: >60 ML/MIN
GFR SERPL CREATININE-BSD FRML MDRD: ABNORMAL ML/MIN/{1.73_M2}
GFR SERPL CREATININE-BSD FRML MDRD: ABNORMAL ML/MIN/{1.73_M2}
GLUCOSE BLD-MCNC: 107 MG/DL (ref 70–99)
HCT VFR BLD CALC: 39.4 % (ref 40.7–50.3)
HEMOGLOBIN: 13.2 G/DL (ref 13–17)
IMMATURE GRANULOCYTES: 0 %
INR BLD: 1
LIPASE: 49 U/L (ref 13–60)
LYMPHOCYTES # BLD: 35 % (ref 24–43)
MCH RBC QN AUTO: 30.1 PG (ref 25.2–33.5)
MCHC RBC AUTO-ENTMCNC: 33.5 G/DL (ref 28.4–34.8)
MCV RBC AUTO: 89.7 FL (ref 82.6–102.9)
MONOCYTES # BLD: 10 % (ref 3–12)
NRBC AUTOMATED: 0 PER 100 WBC
PARTIAL THROMBOPLASTIN TIME: 26.2 SEC (ref 20.5–30.5)
PDW BLD-RTO: 12.1 % (ref 11.8–14.4)
PLATELET # BLD: 235 K/UL (ref 138–453)
PLATELET ESTIMATE: ABNORMAL
PMV BLD AUTO: 10.4 FL (ref 8.1–13.5)
POTASSIUM SERPL-SCNC: 3.6 MMOL/L (ref 3.7–5.3)
PRO-BNP: 115 PG/ML
PROTHROMBIN TIME: 10.1 SEC (ref 9–12)
RBC # BLD: 4.39 M/UL (ref 4.21–5.77)
RBC # BLD: ABNORMAL 10*6/UL
SARS-COV-2, RAPID: NOT DETECTED
SARS-COV-2: NORMAL
SARS-COV-2: NORMAL
SEG NEUTROPHILS: 53 % (ref 36–65)
SEGMENTED NEUTROPHILS ABSOLUTE COUNT: 5.37 K/UL (ref 1.5–8.1)
SODIUM BLD-SCNC: 136 MMOL/L (ref 135–144)
SOURCE: NORMAL
TOTAL PROTEIN: 6.1 G/DL (ref 6.4–8.3)
TROPONIN INTERP: NORMAL
TROPONIN INTERP: NORMAL
TROPONIN T: NORMAL NG/ML
TROPONIN T: NORMAL NG/ML
TROPONIN, HIGH SENSITIVITY: 6 NG/L (ref 0–22)
TROPONIN, HIGH SENSITIVITY: 6 NG/L (ref 0–22)
WBC # BLD: 10.1 K/UL (ref 3.5–11.3)
WBC # BLD: ABNORMAL 10*3/UL

## 2021-01-09 PROCEDURE — 84484 ASSAY OF TROPONIN QUANT: CPT

## 2021-01-09 PROCEDURE — 85025 COMPLETE CBC W/AUTO DIFF WBC: CPT

## 2021-01-09 PROCEDURE — 93005 ELECTROCARDIOGRAM TRACING: CPT | Performed by: EMERGENCY MEDICINE

## 2021-01-09 PROCEDURE — 80053 COMPREHEN METABOLIC PANEL: CPT

## 2021-01-09 PROCEDURE — 83690 ASSAY OF LIPASE: CPT

## 2021-01-09 PROCEDURE — 71275 CT ANGIOGRAPHY CHEST: CPT

## 2021-01-09 PROCEDURE — 6360000004 HC RX CONTRAST MEDICATION: Performed by: EMERGENCY MEDICINE

## 2021-01-09 PROCEDURE — 72125 CT NECK SPINE W/O DYE: CPT

## 2021-01-09 PROCEDURE — 6370000000 HC RX 637 (ALT 250 FOR IP): Performed by: EMERGENCY MEDICINE

## 2021-01-09 PROCEDURE — G0378 HOSPITAL OBSERVATION PER HR: HCPCS

## 2021-01-09 PROCEDURE — 85610 PROTHROMBIN TIME: CPT

## 2021-01-09 PROCEDURE — 99285 EMERGENCY DEPT VISIT HI MDM: CPT

## 2021-01-09 PROCEDURE — 96374 THER/PROPH/DIAG INJ IV PUSH: CPT

## 2021-01-09 PROCEDURE — 6360000002 HC RX W HCPCS: Performed by: EMERGENCY MEDICINE

## 2021-01-09 PROCEDURE — 96375 TX/PRO/DX INJ NEW DRUG ADDON: CPT

## 2021-01-09 PROCEDURE — 71045 X-RAY EXAM CHEST 1 VIEW: CPT

## 2021-01-09 PROCEDURE — U0002 COVID-19 LAB TEST NON-CDC: HCPCS

## 2021-01-09 PROCEDURE — 83880 ASSAY OF NATRIURETIC PEPTIDE: CPT

## 2021-01-09 PROCEDURE — 85730 THROMBOPLASTIN TIME PARTIAL: CPT

## 2021-01-09 RX ORDER — SODIUM CHLORIDE 0.9 % (FLUSH) 0.9 %
10 SYRINGE (ML) INJECTION PRN
Status: DISCONTINUED | OUTPATIENT
Start: 2021-01-09 | End: 2021-01-11

## 2021-01-09 RX ORDER — ISOSORBIDE MONONITRATE 30 MG/1
30 TABLET, EXTENDED RELEASE ORAL DAILY
Status: DISCONTINUED | OUTPATIENT
Start: 2021-01-10 | End: 2021-01-11 | Stop reason: HOSPADM

## 2021-01-09 RX ORDER — MELOXICAM 7.5 MG/1
7.5 TABLET ORAL 2 TIMES DAILY WITH MEALS
Status: DISCONTINUED | OUTPATIENT
Start: 2021-01-10 | End: 2021-01-11 | Stop reason: HOSPADM

## 2021-01-09 RX ORDER — NITROGLYCERIN 0.4 MG/1
0.4 TABLET SUBLINGUAL ONCE
Status: COMPLETED | OUTPATIENT
Start: 2021-01-09 | End: 2021-01-09

## 2021-01-09 RX ORDER — OXYCODONE HYDROCHLORIDE 5 MG/1
5 TABLET ORAL EVERY 4 HOURS PRN
Status: DISCONTINUED | OUTPATIENT
Start: 2021-01-09 | End: 2021-01-11 | Stop reason: HOSPADM

## 2021-01-09 RX ORDER — ONDANSETRON 2 MG/ML
4 INJECTION INTRAMUSCULAR; INTRAVENOUS ONCE
Status: COMPLETED | OUTPATIENT
Start: 2021-01-09 | End: 2021-01-09

## 2021-01-09 RX ORDER — CLOPIDOGREL BISULFATE 75 MG/1
75 TABLET ORAL DAILY
Status: DISCONTINUED | OUTPATIENT
Start: 2021-01-10 | End: 2021-01-11 | Stop reason: HOSPADM

## 2021-01-09 RX ORDER — ACETAMINOPHEN 325 MG/1
650 TABLET ORAL EVERY 4 HOURS PRN
Status: DISCONTINUED | OUTPATIENT
Start: 2021-01-09 | End: 2021-01-11 | Stop reason: HOSPADM

## 2021-01-09 RX ORDER — ATORVASTATIN CALCIUM 80 MG/1
40 TABLET, FILM COATED ORAL DAILY
Status: DISCONTINUED | OUTPATIENT
Start: 2021-01-10 | End: 2021-01-11 | Stop reason: HOSPADM

## 2021-01-09 RX ORDER — LIDOCAINE 4 G/G
1 PATCH TOPICAL ONCE
Status: COMPLETED | OUTPATIENT
Start: 2021-01-09 | End: 2021-01-10

## 2021-01-09 RX ORDER — ACETAMINOPHEN 500 MG
1000 TABLET ORAL ONCE
Status: COMPLETED | OUTPATIENT
Start: 2021-01-09 | End: 2021-01-09

## 2021-01-09 RX ORDER — METOPROLOL SUCCINATE 25 MG/1
25 TABLET, EXTENDED RELEASE ORAL DAILY
Status: DISCONTINUED | OUTPATIENT
Start: 2021-01-10 | End: 2021-01-11 | Stop reason: HOSPADM

## 2021-01-09 RX ORDER — ONDANSETRON 2 MG/ML
4 INJECTION INTRAMUSCULAR; INTRAVENOUS EVERY 8 HOURS PRN
Status: DISCONTINUED | OUTPATIENT
Start: 2021-01-09 | End: 2021-01-10

## 2021-01-09 RX ORDER — ASPIRIN 81 MG/1
81 TABLET, CHEWABLE ORAL DAILY
Status: DISCONTINUED | OUTPATIENT
Start: 2021-01-10 | End: 2021-01-11 | Stop reason: HOSPADM

## 2021-01-09 RX ORDER — SODIUM CHLORIDE 0.9 % (FLUSH) 0.9 %
10 SYRINGE (ML) INJECTION EVERY 12 HOURS SCHEDULED
Status: DISCONTINUED | OUTPATIENT
Start: 2021-01-09 | End: 2021-01-11

## 2021-01-09 RX ORDER — MORPHINE SULFATE 4 MG/ML
4 INJECTION, SOLUTION INTRAMUSCULAR; INTRAVENOUS ONCE
Status: COMPLETED | OUTPATIENT
Start: 2021-01-09 | End: 2021-01-09

## 2021-01-09 RX ADMIN — ONDANSETRON 4 MG: 2 INJECTION INTRAMUSCULAR; INTRAVENOUS at 19:38

## 2021-01-09 RX ADMIN — NITROGLYCERIN 0.4 MG: 0.4 TABLET, ORALLY DISINTEGRATING SUBLINGUAL at 19:38

## 2021-01-09 RX ADMIN — MORPHINE SULFATE 4 MG: 4 INJECTION INTRAVENOUS at 19:57

## 2021-01-09 RX ADMIN — IOPAMIDOL 75 ML: 755 INJECTION, SOLUTION INTRAVENOUS at 22:30

## 2021-01-09 RX ADMIN — ACETAMINOPHEN 1000 MG: 500 TABLET ORAL at 22:31

## 2021-01-09 ASSESSMENT — ENCOUNTER SYMPTOMS
CONSTIPATION: 0
SHORTNESS OF BREATH: 1
SORE THROAT: 0
NAUSEA: 1
DIARRHEA: 0
ABDOMINAL PAIN: 0

## 2021-01-09 ASSESSMENT — HEART SCORE: ECG: 1

## 2021-01-09 ASSESSMENT — PAIN SCALES - GENERAL
PAINLEVEL_OUTOF10: 5
PAINLEVEL_OUTOF10: 10
PAINLEVEL_OUTOF10: 8
PAINLEVEL_OUTOF10: 8

## 2021-01-10 ENCOUNTER — APPOINTMENT (OUTPATIENT)
Dept: MRI IMAGING | Age: 61
DRG: 287 | End: 2021-01-10
Payer: MEDICARE

## 2021-01-10 ENCOUNTER — APPOINTMENT (OUTPATIENT)
Dept: GENERAL RADIOLOGY | Age: 61
DRG: 287 | End: 2021-01-10
Payer: MEDICARE

## 2021-01-10 PROCEDURE — 6370000000 HC RX 637 (ALT 250 FOR IP): Performed by: EMERGENCY MEDICINE

## 2021-01-10 PROCEDURE — 93005 ELECTROCARDIOGRAM TRACING: CPT | Performed by: EMERGENCY MEDICINE

## 2021-01-10 PROCEDURE — 72141 MRI NECK SPINE W/O DYE: CPT

## 2021-01-10 PROCEDURE — G0378 HOSPITAL OBSERVATION PER HR: HCPCS

## 2021-01-10 PROCEDURE — 2580000003 HC RX 258: Performed by: EMERGENCY MEDICINE

## 2021-01-10 PROCEDURE — 96372 THER/PROPH/DIAG INJ SC/IM: CPT

## 2021-01-10 PROCEDURE — 6360000002 HC RX W HCPCS: Performed by: EMERGENCY MEDICINE

## 2021-01-10 PROCEDURE — 96376 TX/PRO/DX INJ SAME DRUG ADON: CPT

## 2021-01-10 PROCEDURE — 73030 X-RAY EXAM OF SHOULDER: CPT

## 2021-01-10 PROCEDURE — 6360000002 HC RX W HCPCS: Performed by: STUDENT IN AN ORGANIZED HEALTH CARE EDUCATION/TRAINING PROGRAM

## 2021-01-10 PROCEDURE — 99222 1ST HOSP IP/OBS MODERATE 55: CPT | Performed by: NEUROLOGICAL SURGERY

## 2021-01-10 RX ORDER — ONDANSETRON 2 MG/ML
4 INJECTION INTRAMUSCULAR; INTRAVENOUS EVERY 4 HOURS
Status: DISCONTINUED | OUTPATIENT
Start: 2021-01-10 | End: 2021-01-11 | Stop reason: HOSPADM

## 2021-01-10 RX ADMIN — OXYCODONE HYDROCHLORIDE 5 MG: 5 TABLET ORAL at 21:18

## 2021-01-10 RX ADMIN — SODIUM CHLORIDE, PRESERVATIVE FREE 10 ML: 5 INJECTION INTRAVENOUS at 00:48

## 2021-01-10 RX ADMIN — OXYCODONE HYDROCHLORIDE 5 MG: 5 TABLET ORAL at 16:09

## 2021-01-10 RX ADMIN — ONDANSETRON 4 MG: 2 INJECTION INTRAMUSCULAR; INTRAVENOUS at 05:06

## 2021-01-10 RX ADMIN — ATORVASTATIN CALCIUM 40 MG: 80 TABLET, FILM COATED ORAL at 08:30

## 2021-01-10 RX ADMIN — ONDANSETRON 4 MG: 2 INJECTION INTRAMUSCULAR; INTRAVENOUS at 21:18

## 2021-01-10 RX ADMIN — ONDANSETRON 4 MG: 2 INJECTION INTRAMUSCULAR; INTRAVENOUS at 00:49

## 2021-01-10 RX ADMIN — ENOXAPARIN SODIUM 40 MG: 40 INJECTION SUBCUTANEOUS at 08:30

## 2021-01-10 RX ADMIN — ACETAMINOPHEN 650 MG: 325 TABLET ORAL at 05:06

## 2021-01-10 RX ADMIN — CLOPIDOGREL 75 MG: 75 TABLET, FILM COATED ORAL at 08:30

## 2021-01-10 RX ADMIN — OXYCODONE HYDROCHLORIDE 5 MG: 5 TABLET ORAL at 00:48

## 2021-01-10 RX ADMIN — ONDANSETRON 4 MG: 2 INJECTION INTRAMUSCULAR; INTRAVENOUS at 14:48

## 2021-01-10 RX ADMIN — MELOXICAM 7.5 MG: 7.5 TABLET ORAL at 16:09

## 2021-01-10 RX ADMIN — MELOXICAM 7.5 MG: 7.5 TABLET ORAL at 09:09

## 2021-01-10 RX ADMIN — Medication 10 ML: at 05:06

## 2021-01-10 RX ADMIN — METOPROLOL SUCCINATE 25 MG: 25 TABLET, FILM COATED, EXTENDED RELEASE ORAL at 08:30

## 2021-01-10 RX ADMIN — SODIUM CHLORIDE, PRESERVATIVE FREE 10 ML: 5 INJECTION INTRAVENOUS at 21:19

## 2021-01-10 RX ADMIN — ONDANSETRON 4 MG: 2 INJECTION INTRAMUSCULAR; INTRAVENOUS at 08:29

## 2021-01-10 RX ADMIN — ONDANSETRON 4 MG: 2 INJECTION INTRAMUSCULAR; INTRAVENOUS at 17:12

## 2021-01-10 RX ADMIN — ASPIRIN 81 MG: 81 TABLET, CHEWABLE ORAL at 08:29

## 2021-01-10 RX ADMIN — SODIUM CHLORIDE, PRESERVATIVE FREE 10 ML: 5 INJECTION INTRAVENOUS at 08:30

## 2021-01-10 ASSESSMENT — PAIN SCALES - GENERAL
PAINLEVEL_OUTOF10: 4
PAINLEVEL_OUTOF10: 8
PAINLEVEL_OUTOF10: 4
PAINLEVEL_OUTOF10: 8

## 2021-01-10 ASSESSMENT — PAIN DESCRIPTION - ONSET
ONSET: ON-GOING
ONSET: ON-GOING

## 2021-01-10 ASSESSMENT — PAIN DESCRIPTION - ORIENTATION
ORIENTATION: LEFT
ORIENTATION: MID

## 2021-01-10 ASSESSMENT — PAIN DESCRIPTION - PROGRESSION
CLINICAL_PROGRESSION: NOT CHANGED

## 2021-01-10 ASSESSMENT — PAIN DESCRIPTION - PAIN TYPE
TYPE: CHRONIC PAIN
TYPE: CHRONIC PAIN

## 2021-01-10 ASSESSMENT — PAIN DESCRIPTION - DIRECTION: RADIATING_TOWARDS: BACK/NECK

## 2021-01-10 ASSESSMENT — PAIN DESCRIPTION - DESCRIPTORS: DESCRIPTORS: ACHING;DISCOMFORT

## 2021-01-10 ASSESSMENT — PAIN DESCRIPTION - LOCATION: LOCATION: CHEST;NECK

## 2021-01-10 ASSESSMENT — PAIN DESCRIPTION - FREQUENCY: FREQUENCY: INTERMITTENT

## 2021-01-10 NOTE — ED NOTES
Pt resting comfortably awake, denies needs at this time  Equal chest rise/fall, alert, oriented speaking clearly. Call light within reach, bedlocked in lowest position  Will continue to monitor.        Debi Franz RN  01/10/21 [id number]

## 2021-01-10 NOTE — H&P
coronary artery bypass graft, CAD (coronary artery disease), Chronic back pain, Congenital heart disease, Depression, Headache(784.0), History of colon polyps, Hx of heart artery stent, Hyperlipidemia, Hypertension, LONG TERM ANTICOAGULENT USE, MI (myocardial infarction) ([address] Osteoarthritis, Radicular syndrome of left leg, Restless legs syndrome, S/P CABG x 4, and Shingles outbreak. I have reviewed the past medical history with the patient and it is pertinent to this complaint. SURGICAL HISTORY      has a past surgical history that includes Appendectomy; Coronary artery bypass graft (, ); Upper gastrointestinal endoscopy; Cardiac catheterization; Cardiac catheterization; and Colonoscopy (N/A, 10/15/2019). I have reviewed and agree with Surgical History entered and it is pertinent to this complaint. CURRENT MEDICATIONS         ondansetron (ZOFRAN) injection 4 mg, Q4H      aspirin chewable tablet 81 mg, Daily      metoprolol succinate (TOPROL XL) extended release tablet 25 mg, Daily      atorvastatin (LIPITOR) tablet 40 mg, Daily      clopidogrel (PLAVIX) tablet 75 mg, Daily      meloxicam (MOBIC) tablet 7.5 mg, BID WC      isosorbide mononitrate (IMDUR) extended release tablet 30 mg, Daily      sodium chloride flush 0.9 % injection 10 mL, 2 times per day      sodium chloride flush 0.9 % injection 10 mL, PRN      enoxaparin (LOVENOX) injection 40 mg, Daily      acetaminophen (TYLENOL) tablet 650 mg, Q4H PRN      oxyCODONE (ROXICODONE) immediate release tablet 5 mg, Q4H PRN      lidocaine 4 % external patch 1 patch, Once        All medication charted and reviewed. ALLERGIES     has No Known Allergies. FAMILY HISTORY     He indicated that his mother is alive. He indicated that his father is . family history includes Cancer in his father; Diabetes in his mother; Heart Disease in his father; Hypertension in his mother.   The patient denies any pertinent family history. I have reviewed and agree with the family history entered. I have reviewed the Family History and it is not significant to the case    SOCIAL HISTORY      reports that he quit smoking about 21 years ago. His smoking use included cigarettes. He has a 20.00 pack-year smoking history. He has never used smokeless tobacco. He reports that he does not drink alcohol or use drugs. I have reviewed and agree with all Social.  There are no concerns for substance abuse/use. PHYSICAL EXAM     INITIAL VITALS:  height is 5' 5\" (1.651 m) and weight is 140 lb (63.5 kg). His oral temperature is 98.4 °F (36.9 °C). His blood pressure is 133/72 and his pulse is 55. His respiration is 14 and oxygen saturation is 99%.       CONSTITUTIONAL: AOx4, no apparent distress, appears stated age    HEAD: normocephalic, atraumatic   EYES: PERRLA, EOMI    ENT: moist mucous membranes, uvula midline   NECK: supple, symmetric   BACK: symmetric   LUNGS: clear to auscultation bilaterally   CARDIOVASCULAR: regular rate and rhythm, no murmurs, rubs or gallops   ABDOMEN: soft, non-tender, non-distended with normal active bowel sounds   NEUROLOGIC:  MAEx4, no focal sensory or motor deficits   MUSCULOSKELETAL: no clubbing, cyanosis or edema   SKIN: no rash or wounds       DIFFERENTIAL DIAGNOSIS/MDM:     Chest Pain:  DDX: Emergent: ACS/NSTEMI/STEMI/angina, arrhythmia, trauma, aortic dissection,  PE, PNA, pneumothroax, esophageal rupture, tamponade, Cocaine use  Nonemergent: pneumonia, pericarditis, GERD, MSK, Endocarditis, anxiety  Evaluated for: diaphoresis, present chest pain, tachypnea, BP both arms, heart sounds, JVD, tender chest wall, wheezing    DIAGNOSTIC RESULTS     EKG: All EKG's are interpreted by the Observation Physician who either signs or Co-signs this chart in the absence of a cardiologist.    EKG Interpretation    Interpreted by observation physician    Rhythm: normal sinus   Rate: normal  Axis: normal  Ectopy: none  Conduction: normal  ST Segments: no acute change  T Waves: inversion in  v2  Q Waves: none    Clinical Impression: non-specific EKG    RADIOLOGY:   I directly visualized the following  images and reviewed the radiologist interpretations:    Cta Chest W Wo Contrast    Result Date: 1/9/2021  EXAMINATION: CTA OF THE CHEST WITH AND WITHOUT CONTRAST 1/9/2021 10:24 pm TECHNIQUE: CTA of the chest was performed before and after the administration of intravenous contrast.  Multiplanar reformatted images are provided for review. MIP images are provided for review. Dose modulation, iterative reconstruction, and/or weight based adjustment of the mA/kV was utilized to reduce the radiation dose to as low as reasonably achievable. COMPARISON: Chest x-ray from today and CT chest February 21, 2007 HISTORY: ORDERING SYSTEM PROVIDED HISTORY: Sharp midsternal CP with multiple cardiology procedures, please eval for dissection TECHNOLOGIST PROVIDED HISTORY: Sharp midsternal CP with multiple cardiology procedures, please eval for dissection Reason for Exam: Sharp midsternal CP with multiple cardiology procedures, please eval for dissection Acuity: Acute Type of Exam: Initial FINDINGS: Aorta: No evidence of thoracic aortic aneurysm or dissection. No acute abnormality of the aorta. Mediastinum: No evidence of mediastinal lymphadenopathy. The heart and pericardium demonstrate no acute abnormality. Calcific coronary artery disease and stents. Sternotomy wires. Lungs/Pleura: The lungs are without acute process. No focal consolidation or pulmonary edema. No evidence of pleural effusion or pneumothorax. Upper Abdomen: Limited images of the upper abdomen are unremarkable. Soft Tissues/Bones: No acute bone or soft tissue abnormality. Sternotomy coronary stents. No acute disease or evidence of aortic dissection or aneurysm.      Ct Cervical Spine Wo Contrast    Result Date: 1/9/2021  EXAMINATION: CT OF THE CERVICAL SPINE WITHOUT CONTRAST 1/9/2021 8:20 pm TECHNIQUE: CT of the cervical spine was performed without the administration of intravenous contrast. Multiplanar reformatted images are provided for review. Dose modulation, iterative reconstruction, and/or weight based adjustment of the mA/kV was utilized to reduce the radiation dose to as low as reasonably achievable. COMPARISON: None. HISTORY: ORDERING SYSTEM PROVIDED HISTORY: +Spurling test with L arm radiculopathy TECHNOLOGIST PROVIDED HISTORY: +Spurling test with L arm radiculopathy Reason for Exam: +Spurling test with L arm radiculopathy Acuity: Acute Type of Exam: Initial FINDINGS: BONES/ALIGNMENT: There is no acute fracture or traumatic malalignment. DEGENERATIVE CHANGES: Small posterior central disc protrusions C2-3, C3-4, C4-5, and C5-6. This is most severe at C4-5 with 50% narrowing of the central spinal canal. SOFT TISSUES: There is no prevertebral soft tissue swelling. Calcified plaque in the carotid bulbs bilaterally. Multilevel disc protrusions causing severe spinal stenosis at C4-5. Recommend MRI. Xr Chest Portable    Result Date: 1/9/2021  EXAMINATION: ONE XRAY VIEW OF THE CHEST 1/9/2021 6:23 pm COMPARISON: January 2, 2020 HISTORY: ORDERING SYSTEM PROVIDED HISTORY: SOA, chest pain after vomiting yesterday TECHNOLOGIST PROVIDED HISTORY: SOA, chest pain after vomiting yesterday FINDINGS: Adequate inspiration is present. No focal area of consolidation or pneumothorax is present. No pleural effusion is noted. Heart size is stable. Patient is status post prior CABG. Osseous structures are normal.     No evidence of acute cardiopulmonary disease       LABS:  I have reviewed and interpreted all available lab results.   Labs Reviewed   CBC WITH AUTO DIFFERENTIAL - Abnormal; Notable for the following components:       Result Value    Hematocrit 39.4 (*)     All other components within normal limits   COMPREHENSIVE METABOLIC PANEL W/ REFLEX TO MG FOR LOW K - Abnormal; Notable for the

## 2021-01-10 NOTE — CONSULTS
[facility]   Consult Note         Today's Date: 1/10/2021  Patient Name: Efren Encinas  Date of admission: 1/9/2021  7:02 PM  Patient's age: 56 y.o., 1960  Admission Dx: Chest pain [R07.9]    Reason for Consult:  Cardiac evaluation    Requesting Physician: Lopez Boyd MD    REASON FOR CONSULT: Chest pain    History Obtained From:  Patient, chart, staff, records    HISTORY OF PRESENT ILLNESS:      The patient is a 56 y.o. male who is admitted to the hospital for chest pain; patient started having sudden onset left-sided chest pain, sharp and shooting in nature, radiating down left arm, associated with shortness of breath, sweating and nausea worsening with physical exertion as well as lying flat. Pinpoint tenderness on palpation present on physical exam.    He underwent stress test on 1/5/2021 which showed mid inferior reversible defect indicative of ischemia-intermediate risk, was scheduled for cardiac catheterization on upcoming Wednesday with his cardiologist Dr. Robert Maldonado. Was started on Imdur 2 days ago, complains of headache and intermittent leg pains bilaterally since then. Troponins negative, proBNP 115, creatinine 0.83. CT cervical spine showed severe cervical radiculopathy. Past Medical History:   has a past medical history of Anxiety, Atherosclerosis of autologous vein coronary artery bypass graft, CAD (coronary artery disease), Chronic back pain, Congenital heart disease, Depression, Headache(784.0), History of colon polyps, Hx of heart artery stent, Hyperlipidemia, Hypertension, LONG TERM ANTICOAGULENT USE, MI (myocardial infarction) ([address] Osteoarthritis, Radicular syndrome of left leg, Restless legs syndrome, S/P CABG x 4, and Shingles outbreak. Past Surgical History:   has a past surgical history that includes Appendectomy; Coronary artery bypass graft (2008, 2009);  Upper gastrointestinal endoscopy; Cardiac catheterization; Cardiac catheterization; and Colonoscopy (N/A, 10/15/2019). Home Medications:    Prior to Admission medications    Medication Sig Start Date End Date Taking? Authorizing Provider   meloxicam (MOBIC) 7.5 MG tablet Take 1 tablet by mouth 2 times daily (with meals) 10/2/20  Yes Varghese Small PA-C   atorvastatin (LIPITOR) 40 MG tablet TAKE 1 TABLET BY MOUTH DAILY 3/9/20  Yes Varghese Small PA-C   clopidogrel (PLAVIX) 75 MG tablet TAKE 1 TABLET BY MOUTH DAILY 3/9/20  Yes Varghese Small PA-C   isosorbide mononitrate (IMDUR) 30 MG extended release tablet Take 30 mg by mouth 5/2/19  Yes Historical Provider, MD   metoprolol succinate (TOPROL XL) 25 MG extended release tablet Take 25 mg by mouth daily 8/13/19 1/10/21 Yes Historical Provider, MD   aspirin (ASPIRIN CHILDRENS) 81 MG chewable tablet Take 1 tablet by mouth daily 10/5/18  Yes Usha Garcia DO   diclofenac sodium (VOLTAREN) 1 % GEL Apply 2 g topically 4 times daily 10/7/20   ROSSANA Steven   ibuprofen (ADVIL;MOTRIN) 800 MG tablet TAKE 1 TABLET BY MOUTH TWICE DAILY AS NEEDED FOR PAIN 5/18/20   Varghese Small PA-C   bisacodyl (DULCOLAX) 5 MG EC tablet TAKE 2 TABS AT 9 AM DAY PRIOR TO COLONOSCOPY 10/8/19   Fritz Frey MD   nitroGLYCERIN (NITROSTAT) 0.4 MG SL tablet Place 0.4 mg under the tongue 3/25/19   Historical Provider, MD       ondansetron, 4 mg, Intravenous, Q4H    aspirin, 81 mg, Oral, Daily    metoprolol succinate, 25 mg, Oral, Daily    atorvastatin, 40 mg, Oral, Daily    clopidogrel, 75 mg, Oral, Daily    meloxicam, 7.5 mg, Oral, BID WC    isosorbide mononitrate, 30 mg, Oral, Daily    sodium chloride flush, 10 mL, Intravenous, 2 times per day    enoxaparin, 40 mg, Subcutaneous, Daily      Allergies:  Patient has no known allergies. Social History:   reports that he quit smoking about 21 years ago. His smoking use included cigarettes. He has a 20.00 pack-year smoking history.  He has never used smokeless tobacco. He reports that he does not drink alcohol or use drugs. Family History: family history includes Cancer in his father; Diabetes in his mother; Heart Disease in his father; Hypertension in his mother. No h/o sudden cardiac death. REVIEW OF SYSTEMS:    · Constitutional: there has been no unanticipated weight loss. There's been No change in energy level, No change in activity level. · Eyes: No visual changes or diplopia. No scleral icterus. · ENT: No Headaches, hearing loss or vertigo. No mouth sores or sore throat. · Cardiovascular: per HPI  · Respiratory: per HPI  · Gastrointestinal: No abdominal pain, appetite loss, blood in stools. No change in bowel or bladder habits. · Genitourinary: No dysuria, trouble voiding, or hematuria. · Musculoskeletal:  No gait disturbance, No weakness or joint complaints. · Integumentary: No rash or pruritis. · Neurological: No headache, diplopia, change in muscle strength, numbness or tingling. No change in gait, balance, coordination, mood, affect, memory, mentation, behavior. · Psychiatric: No anxiety, or depression. · Endocrine: No temperature intolerance. No excessive thirst, fluid intake, or urination. No tremor. · Hematologic/Lymphatic: No abnormal bruising or bleeding, blood clots or swollen lymph nodes. · Allergic/Immunologic: No nasal congestion or hives. PHYSICAL EXAM:      BP (!) 137/55   Pulse 53   Temp 98.1 °F (36.7 °C) (Oral)   Resp 15   Ht 5' 5\" (1.651 m)   Wt 140 lb (63.5 kg)   SpO2 98%   BMI 23.30 kg/m²    Constitutional and General Appearance: alert, cooperative, no distress and appears stated age  HEENT: PERRL, no cervical lymphadenopathy. No masses palpable. Normal oral mucosa  Respiratory:  · Normal excursion and expansion without use of accessory muscles  · Resp Auscultation: Good respiratory effort. No for increased work of breathing. On auscultation: clear to auscultation bilaterally  Cardiovascular:  · The apical impulse is not displaced.   Pinpoint tenderness on left-sided chest wall on palpation. · Heart tones are crisp and normal. regular S1 and S2.  · Jugular venous pulsation Normal  · The carotid upstroke is normal in amplitude and contour without delay or bruit  · Peripheral pulses are symmetrical and full   Abdomen:   · No masses or tenderness  · Bowel sounds present  Extremities:  ·  No Cyanosis or Clubbing  ·  Lower extremity edema: No  ·  Skin: Warm and dry  Neurological:  · Alert and oriented. · Moves all extremities well  · No abnormalities of mood, affect, memory, mentation, or behavior are noted        EKG:    Date: 01/10/21  Reading: Sinus Bradycardia, T wave inversions in V2. LAST ECHO:  Date: 2017  Findings Summary: Normal left ventricle size, wall thickness and function with an estimated EF > 55%. Thickened anterior mitral valve leaflet. Mild mitral regurgitation. Mild tricuspid regurgitation. Estimated right ventricular systolic pressure  is 25 mmHg. Normal right ventricular systolic pressure. No significant pericardial effusion is seen. LAST Stress Test:   Date of last ST: 1/5/21  Major Findings: Perfusion Defect: -There is small size, moderate intensity mid inferior reversible defect which represents ischemia. -There is basal inferior and inferoseptal fixed defect which could be due to old scar versus soft tissue attenuation/artifact.  Most likely it is soft tissue attenuation versus artifact as there is preserved contractility on gated images.  There is significant subdiaphragmatic GI uptake of radiotracer.   Stress Function Comments: Post-stress ejection fraction is 60 %.   Baseline ECG indicates sinus tachycardia. There were no arrhythmias during stress. The stress ECG was negative.   Perfusion Comments: Based on the perfusion study data, risk of   cardiovascular events is intermediate risk. LAST Cardiac Angiography:.  Date: 2/2017   Findings: LMCA: Chronic occlusion 100 % .      LAD: Chronic occlusion 100 % .     LCx: Chronic occlusion 100 % .    RCA: patent stents in the proximal, mid and distal vessel. The distal RCA gives right to left collaterals to the distal LCX and mid LAD     Cardiac Grafts   -  There is a Vein graft that originates at the Aorta Left and attaches to the Mid LAD. mild <20% stenosis, patent graft     -  There is a Vein graft that originates at the Aorta Left and attaches to the 1st [address]. patent, with 30-40% proximal lesion      Labs:     CBC:   Recent Labs     01/09/21 1944   WBC 10.1   HGB 13.2   HCT 39.4*        BMP:   Recent Labs     01/09/21 1944      K 3.6*   CO2 21   BUN 14   CREATININE 0.83   LABGLOM >60   GLUCOSE 107*     BNP: No results for input(s): BNP in the last 72 hours. PT/INR:   Recent Labs     01/09/21 1944   PROTIME 10.1   INR 1.0     APTT:  Recent Labs     01/09/21 1944   APTT 26.2     CARDIAC ENZYMES:No results for input(s): CKTOTAL, CKMB, CKMBINDEX, TROPONINI in the last 72 hours.   FASTING LIPID PANEL:  Lab Results   Component Value Date    HDL 37 02/24/2017    TRIG 70 02/24/2017     LIVER PROFILE:  Recent Labs     01/09/21 1944   AST 17   ALT 15   LABALBU 3.9     Troponins: Invalid input(s): TROPONIN     Other Current Problems  Patient Active Problem List   Diagnosis    CAD (coronary artery disease) s/p CABGx4, 10 stents    Anxiety    Chest pain with high risk of acute coronary syndrome    Unstable gait    DJD (degenerative joint disease), lumbosacral    Chest wall pain, chronic    Psychophysiological insomnia    Frequency of urination    Urinary hesitancy    Nocturia more than twice per night    Mixed hyperlipidemia    Hypertension    Radicular syndrome of left leg    Primary osteoarthritis of both knees    Herpes zoster keratitis    Herpes zoster keratoconjunctivitis    Ganglionitis, gasserian    Ocular hypertension    Unstable angina (HCC)    Contusion of left lower leg    Reactive depression    Atherosclerosis of coronary artery bypass graft(s) without angina pectoris    Opioid withdrawal (HCC)    Bilateral hearing loss    Polyp of descending colon    History of colon polyps    Chest pain           IMPRESSION & Recommendations:    Typical angina s/p CABG x 2, also with Hx of 10 stents placed-stress test on 1/5/21 showed mid inferior reversible defect indicative of ischemia. Patient is scheduled for cardiac cath with his cardiologist Dr. Александр Linares at [facility] on upcoming wednesday but wishes to undergo catheterization during this inpatient admission due to unrelenting chest pain. Plan for coronary angiography tomorrow am. Keep NPO starting midnight. Continue with ASA, statin, plavix, imdur 30 mg daily, toprol 25 mg daily  Musculoskeletal left sided chest pain-give pain relief medications as per discretion of primary team. This in addition to actual typical angina. HTN-toprol 25 mg daily, imdur 30 mg daily  HLD-lipitor 40 mg daily. Discussed with patient, family, and Nurse. Electronically signed by Mardella Homans, MD on 1/10/2021 at 11:07 Gracy Kaplan MD  PGY-1, [id number] [facility]. Attending Cardiologist Addendum: I have reviewed and performed the history, physical, subjective, objective, assessment, and plan with the resident/fellow/NP and agree with the note. I performed the history and physical personally. I have made changes to the note above as needed. Thank you for allowing me to participate in the care of this patient, please do not hesitate to call if you have any questions. Kathi Barcenas, [id number] [facility]  [facility]. [facility]  [phone]

## 2021-01-10 NOTE — ED NOTES
Pt arrived to ed c/o chest pain started yesterday. Pain is intermittent, sharp. Pt has 1 episode of vomiting, with constant nausea. Pt reports starting a new medication for angia but cannot remember the name of it. Pt reports taking two nitros at home without relief and now reports headache. Pt appears in distress, pt is restless, slightly diaphorectic, pain with inspiration and movement. Pt is alert, oriented. Wife at bedside. Hx of CABG with 2 stents. Placed on monitor will continue to monitor.       Joselyn Arnold RN  01/09/21 1954

## 2021-01-10 NOTE — ED NOTES
Pt reports decreased pain to 5/10  Denies other needs   Equal chest rise/fall, alert, oriented speaking clearly. Call light within reach, bedlocked in lowest position  Will continue to monitor.        Veronique Estrada RN  01/10/21 0440

## 2021-01-10 NOTE — PROGRESS NOTES
[facility]  CDU / OBSERVATION eNCOUnter  Attending NOte       I performed a history and physical examination of the patient and discussed management with the resident. I reviewed the residents note and agree with the documented findings and plan of care. Any areas of disagreement are noted on the chart. I was personally present for the key portions of any procedures. I have documented in the chart those procedures where I was not present during the key portions. I have reviewed the nurses notes. I agree with the chief complaint, past medical history, past surgical history, allergies, medications, social and family history as documented unless otherwise noted below. The Family history, social history, and ROS are effectively unchanged since admission unless noted elsewhere in the chart. Patient presents ED after 3-hour history of left-sided sharp electric chest pain. Patient has complex cardiac history including \"10 stents and 2 open heart surgeries\" patient is on Plavix. Patient an episode of violent vomiting last night. Patient had a stress test intermediate [facility].  Plan was for diagnostic catheterization scheduled 4 days from now. Patient admitted after no ischemic findings found in ED. Will discuss with patient possibility of cardiology here with catheterization versus transfer for primary cardiologist to perform procedure. Patient elected to have procedure done here. Patient for cardiac catheterization tomorrow. Patient with spinal stenosis. Patient for MRI to address concerns while awaiting catheterization.     Emilie Virk MD  Attending Emergency  Physician

## 2021-01-10 NOTE — ED NOTES
Pt resting comfortable in bed, eyes closed, equal chest rise and fall. Call light within reach, bed locked in lowest position. Will continue to monitor.        Sangita Guerra RN  01/10/21 [id number]

## 2021-01-10 NOTE — ED NOTES
Bedside rounding    Writer emptied urinal 500 ML  Pt requesting nausea and pain medication for pain that is 8/10  Orders to follow   MRI checklist completed and faxed at Lyn Madrigal RN  01/10/21 2990

## 2021-01-10 NOTE — CONSULTS
Laterality Date    APPENDECTOMY      CARDIAC CATHETERIZATION      CARDIAC CATHETERIZATION      pt states he has heart stents x 10    COLONOSCOPY N/A 10/15/2019    COLONOSCOPY POLYPECTOMY SNARE/COLD performed by Khris Johnson MD at [facility].  ,     CABG x 4    UPPER GASTROINTESTINAL ENDOSCOPY         Social History:   Social History     Socioeconomic History    Marital status: Single     Spouse name: Not on file    Number of children: Not on file    Years of education: Not on file    Highest education level: Not on file   Occupational History    Not on file   Social Needs    Financial resource strain: Not on file    Food insecurity     Worry: Not on file     Inability: Not on file    Transportation needs     Medical: Not on file     Non-medical: Not on file   Tobacco Use    Smoking status: Former Smoker     Packs/day: 1.00     Years: 20.00     Pack years: 20.00     Types: Cigarettes     Quit date: 10/24/1999     Years since quittin.2    Smokeless tobacco: Never Used   Substance and Sexual Activity    Alcohol use: No    Drug use: No    Sexual activity: Not on file   Lifestyle    Physical activity     Days per week: Not on file     Minutes per session: Not on file    Stress: Not on file   Relationships    Social connections     Talks on phone: Not on file     Gets together: Not on file     Attends Rastafari service: Not on file     Active member of club or organization: Not on file     Attends meetings of clubs or organizations: Not on file     Relationship status: Not on file    Intimate partner violence     Fear of current or ex partner: Not on file     Emotionally abused: Not on file     Physically abused: Not on file     Forced sexual activity: Not on file   Other Topics Concern    Not on file   Social History Narrative    Not on file       Family History:       Problem Relation Age of Onset    Diabetes Mother     Hypertension Mother  Heart Disease Father     Cancer Father        Allergies:  Patient has no known allergies. Home Medications:  Prior to Admission medications    Medication Sig Start Date End Date Taking?  Authorizing Provider   meloxicam (MOBIC) 7.5 MG tablet Take 1 tablet by mouth 2 times daily (with meals) 10/2/20  Yes Sondra Gomes PA-C   atorvastatin (LIPITOR) 40 MG tablet TAKE 1 TABLET BY MOUTH DAILY 3/9/20  Yes Sondra Gomes PA-C   clopidogrel (PLAVIX) 75 MG tablet TAKE 1 TABLET BY MOUTH DAILY 3/9/20  Yes Sondra Gomes PA-C   isosorbide mononitrate (IMDUR) 30 MG extended release tablet Take 30 mg by mouth 5/2/19  Yes Historical Provider, MD   metoprolol succinate (TOPROL XL) 25 MG extended release tablet Take 25 mg by mouth daily 8/13/19 1/10/21 Yes Historical Provider, MD   aspirin (ASPIRIN CHILDRENS) 81 MG chewable tablet Take 1 tablet by mouth daily 10/5/18  Yes Lorretta Arms, DO   diclofenac sodium (VOLTAREN) 1 % GEL Apply 2 g topically 4 times daily 10/7/20   ROSSANA Donato   ibuprofen (ADVIL;MOTRIN) 800 MG tablet TAKE 1 TABLET BY MOUTH TWICE DAILY AS NEEDED FOR PAIN 5/18/20   Sondra Gomes PA-C   bisacodyl (DULCOLAX) 5 MG EC tablet TAKE 2 TABS AT 9 AM DAY PRIOR TO COLONOSCOPY 10/8/19   Jony Islas MD   nitroGLYCERIN (NITROSTAT) 0.4 MG SL tablet Place 0.4 mg under the tongue 3/25/19   Historical Provider, MD       Current Medications:   Current Facility-Administered Medications: ondansetron (ZOFRAN) injection 4 mg, 4 mg, Intravenous, Q4H  aspirin chewable tablet 81 mg, 81 mg, Oral, Daily  metoprolol succinate (TOPROL XL) extended release tablet 25 mg, 25 mg, Oral, Daily  atorvastatin (LIPITOR) tablet 40 mg, 40 mg, Oral, Daily  clopidogrel (PLAVIX) tablet 75 mg, 75 mg, Oral, Daily  meloxicam (MOBIC) tablet 7.5 mg, 7.5 mg, Oral, BID WC  isosorbide mononitrate (IMDUR) extended release tablet 30 mg, 30 mg, Oral, Daily  sodium chloride flush 0.9 % injection 10 mL, 10 mL, Intravenous, 2 times per day  sodium chloride flush 0.9 % injection 10 mL, 10 mL, Intravenous, PRN  enoxaparin (LOVENOX) injection 40 mg, 40 mg, Subcutaneous, Daily  acetaminophen (TYLENOL) tablet 650 mg, 650 mg, Oral, Q4H PRN  oxyCODONE (ROXICODONE) immediate release tablet 5 mg, 5 mg, Oral, Q4H PRN    REVIEW OF SYSTEMS:       CONSTITUTIONAL: negative for fatigue, malaise, wt loss or gain   EYES: negative for double vision, photophobia, tunnel vision   HEENT: negative for tinnitus, hearing loss, sore throat, otorrhea, rhinorrhea   RESPIRATORY: negative for cough, shortness of breath, hemptysis   CARDIOVASCULAR: negative for chest pain, palpitations   GASTROINTESTINAL: negative for nausea, vomiting, diarrhea, hematamesis, melena   GENITOURINARY: negative for incontinence, urinary retention   MUSCULOSKELETAL: negative for new or worsened neck or back pain   NEUROLOGICAL: negative for seizures, new numbness, tingling, weakness, paresthesias   PSYCHIATRIC: negative for new or worsened anxiety or depression     Review of systems otherwise negative.     PHYSICAL EXAM:       BP (!) 137/55   Pulse 53   Temp 98.1 °F (36.7 °C) (Oral)   Resp 15   Ht 5' 5\" (1.651 m)   Wt 140 lb (63.5 kg)   SpO2 98%   BMI 23.30 kg/m²     CONSTITUTIONAL: no apparent distress, appears stated age   HEAD: normocephalic, atraumatic, no guajardo sign or racoon eyes   ENT: moist mucous membranes, no rhinorrhea or otorrhea   NECK: supple, symmetric, no midline tenderness to palpation   BACK: without midline tenderness, step-offs or deformities   LUNGS: Normoxic, no accessorizing, equal chest rise and fall   CARDIOVASCULAR: regular rate and rhythm per telemetry   ABDOMEN: Soft, non-tender, non-distended    NEUROLOGIC:  EYE OPENING     Spontaneous - 4 []       To voice - 3 []       To pain - 2 []       None - 1 []    VERBAL RESPONSE     Appropriate, oriented - 5 []       Dazed or confused - 4 []       Syllables, expletives - 3 []       Grunts - 2 []       None - 1 []    MOTOR RESPONSE     Spontaneous, command - 6 []       Localizes pain - 5 []       Withdraws pain - 4 []       Abnormal flexion - 3 []       Abnormal extension - 2 []       None - 1 []            Total GCS: 15    Mental Status: Oriented x3               Cranial Nerves:    Pupils equal and reactive to light at 4  Extraocular motion intact  No nystagmus  Face symmetric  Shrug symmetric  Tongue and uvula midline   tone symmetric, V1-3 sensation intact/symmetric to light touch  Hearing symmetric    Motor Exam:    Drift:  absent  Tone:  normal    Motor exam is symmetrical 5 out of 5 all extremities bilaterally    Sensory:    Right Upper Extremity:  normal  Left Upper Extremity:  normal  Right Lower Extremity:  normal  Left Lower Extremity:  normal    Deep Tendon Reflexes:    Right Bicep:  2+  Left Bicep:  2+  Right Knee:  2+  Left Knee:  2+    Plantar Response:    Right: downgoing  Left:  downgoing    Clonus:  absent  Tamayo's:  absent    Gait: Not tested   SKIN: no rash       LABS AND IMAGING:     CBC with Differential:    Lab Results   Component Value Date    WBC 10.1 01/09/2021    RBC 4.39 01/09/2021    HGB 13.2 01/09/2021    HCT 39.4 01/09/2021     01/09/2021    MCV 89.7 01/09/2021    MCH 30.1 01/09/2021    MCHC 33.5 01/09/2021    RDW 12.1 01/09/2021    LYMPHOPCT 35 01/09/2021    MONOPCT 10 01/09/2021    BASOPCT 1 01/09/2021    MONOSABS 1.05 01/09/2021    LYMPHSABS 3.56 01/09/2021    EOSABS 0.07 01/09/2021    BASOSABS 0.05 01/09/2021    DIFFTYPE NOT REPORTED 01/09/2021     BMP:    Lab Results   Component Value Date     01/09/2021    K 3.6 01/09/2021     01/09/2021    CO2 21 01/09/2021    BUN 14 01/09/2021    LABALBU 3.9 01/09/2021    CREATININE 0.83 01/09/2021    CALCIUM 8.9 01/09/2021    GFRAA >60 01/09/2021    LABGLOM >60 01/09/2021    GLUCOSE 107 01/09/2021       Radiology Review: CT showed multilevel spondylosis with stenosis  MRI cervical spine confirms multilevel significant

## 2021-01-10 NOTE — CARE COORDINATION
Case Management Initial Discharge Plan  Jackie Miller,             Met with:patient to discuss discharge plans. Information verified: address, contacts, phone number, , insurance Yes    Emergency Contact/Next of Kin name & number: Farhat Montero [phone]    PCP: Thaddeus Sanchez PA-C  Date of last visit: 6 months ago     Insurance Provider: Medicare and Medicaid     Discharge Planning    Living Arrangements:  Family Members   Support Systems:  None    Home has 2 stories  4 stairs to climb to get into front door, 1 flight of stairs to climb to reach second floor  Location of bedroom/bathroom in home second floor     Patient able to perform ADL's:Independent    Current Services (outpatient & in home) none  DME equipment: cane   DME provider: na    Receiving oral anticoagulation therapy? Plavix     If indicated:   Physician managing anticoagulation treatment: na  Where does patient obtain lab work for ATC treatment? na      Potential Assistance Needed:  N/A    Patient agreeable to home care: No  [place] of choice provided:  n/a    Prior SNF/Rehab Placement and Facility: none   Agreeable to SNF/Rehab: No  [place] of choice provided: n/a     Evaluation: no    Expected Discharge date:       Patient expects to be discharged to:  Home  Follow Up Appointment: Best Day/ Time:      Transportation provider: DEE   Transportation arrangements needed for discharge: No    Readmission Risk              Risk of Unplanned Readmission:        0             Does patient have a readmission risk score greater than 14?: AARON   If yes, follow-up appointment must be made within 7 days of discharge.      Goals of Care: Feel better       Discharge Plan: Home Independently           Electronically signed by Margo Crum RN on 1/10/21 at 3:06 PM EST

## 2021-01-10 NOTE — ED PROVIDER NOTES
[facility]     Emergency Department     Faculty Attestation    I performed a history and physical examination of the patient and discussed management with the resident. I reviewed the resident´s note and agree with the documented findings and plan of care. Any areas of disagreement are noted on the chart. I was personally present for the key portions of any procedures. I have documented in the chart those procedures where I was not present during the key portions. I have reviewed the emergency nurses triage note. I agree with the chief complaint, past medical history, past surgical history, allergies, medications, social and family history as documented unless otherwise noted below. For Physician Assistant/ Nurse Practitioner cases/documentation I have personally evaluated this patient and have completed at least one if not all key elements of the E/M (history, physical exam, and MDM). Additional findings are as noted. Chest clear,  Heart exam normal , no pain or swelling on examination of the lower extremities , equal pulses both wrists , trachea midline. Abdomen is nontender without pulsatile mass or bruit. Chest pain does not radiate to the back , and the pain is not pleuritic. The patient has a history of CABG x2 plus multiple cardiac stents. Spurling's test caused some discomfort into his left shoulder.     Marietta Tapia MD [facility]  Attending Physician       EKG Interpretation    Interpreted by emergency department physician    Rhythm: normal sinus   Rate: normal/60  Axis: normal 71  Ectopy: none  Conduction: normal  ST Segments: no acute change  T Waves: Nonspecific changes  Q Waves: none    Clinical Impression: Abnormal EKG    KRISSY Cox MD  01/09/21 1940    Repeat EKG     EKG Interpretation    Interpreted by emergency department physician    Rhythm: normal sinus   Rate: 48  Axis: 17  Ectopy: none  Conduction: QRS duration 104 ms  ST Segments: no acute change  T Waves: no acute change  Q Waves: none    Clinical Impression: Normal EKG except for sinus bradycardia    KRISSY Cho MD  01/09/21 5112

## 2021-01-10 NOTE — ED NOTES
Pt provided urinal   Pt denies other needs at this time    Will continue to monitor       Preston Stockton RN  01/09/21 2005

## 2021-01-10 NOTE — ED NOTES
Dr. Shea Clark notified for patient reporting increasing pain  Repeat EKG obtained  Medication given     at bedside for eval of pt  CTA of chest ordered     Lizzie Esteban RN  01/09/21 8239

## 2021-01-10 NOTE — ED PROVIDER NOTES
[facility]  Emergency Department Encounter  EmergencyMedicine Resident     Pt Name:Shilo Armenta  MRN: [id number]  [place] 1960  Date of evaluation: 1/9/21   PCP:  Avery Babin Dr       Chief Complaint   Patient presents with    Chest Pain       HISTORY OF PRESENT ILLNESS  (Location/Symptom, Timing/Onset, Context/Setting, Quality, Duration, Modifying Factors, Severity.)      Amanda Sandhu is a 56 y.o. male who presents to the emergency department with a 3-hour history of left-sided sharp electric chest pain radiating down the left arm to the elbow via the bicep worsened by laying down and improved by sitting up. Patient has a complex cardiac history including \"10 stents and 2 open heart surgeries\" for which he is on Plavix. Recently started an unknown medication which girlfriend believes was isosorbide mononitrate, the patient has been taking that for the past 2 days. Yesterday he had one episode of \"violent vomiting\" and was able to tolerate p.o. after that although he states that today he is nauseous. Denies recent vision changes, HEENT symptoms, abdominal pain, vomiting today, or problems with urinary or bowel movements. No new numbness or tingling anywhere especially in the left arm. No recent history of exertion although patient does state he had a stress test last week. Cardiology at [facility] documented yesterday that patient has abnormal intermediate risk nuclear stress test with a planned diagnostic cardiac catheterization with graft angiography to rule out graft occlusive disease or revascularizable coronary artery disease, scheduled for 4 days from now. He had complained about a headache and was told that is likely a side effect of isosorbide mononitrate.     PAST MEDICAL / SURGICAL / SOCIAL / FAMILY HISTORY      has a past medical history of Anxiety, Atherosclerosis of autologous vein coronary artery bypass graft, CAD (coronary artery disease), Chronic back pain, Congenital heart disease, Depression, Headache(784.0), History of colon polyps, Hx of heart artery stent, Hyperlipidemia, Hypertension, LONG TERM ANTICOAGULENT USE, MI (myocardial infarction) ([facility] [address] Osteoarthritis, Radicular syndrome of left leg, Restless legs syndrome, S/P CABG x 4, and Shingles outbreak.     has a past surgical history that includes Appendectomy; Coronary artery bypass graft (, ); Upper gastrointestinal endoscopy; Cardiac catheterization; Cardiac catheterization; and Colonoscopy (N/A, 10/15/2019).       Social History     Socioeconomic History    Marital status: Single     Spouse name: Not on file    Number of children: Not on file    Years of education: Not on file    Highest education level: Not on file   Occupational History    Not on file   Social Needs    Financial resource strain: Not on file    Food insecurity     Worry: Not on file     Inability: Not on file    Transportation needs     Medical: Not on file     Non-medical: Not on file   Tobacco Use    Smoking status: Former Smoker     Packs/day: 1.00     Years: 20.00     Pack years: 20.00     Types: Cigarettes     Quit date: 10/24/1999     Years since quittin.2    Smokeless tobacco: Never Used   Substance and Sexual Activity    Alcohol use: No    Drug use: No    Sexual activity: Not on file   Lifestyle    Physical activity     Days per week: Not on file     Minutes per session: Not on file    Stress: Not on file   Relationships    Social connections     Talks on phone: Not on file     Gets together: Not on file     Attends Hoahaoism service: Not on file     Active member of club or organization: Not on file     Attends meetings of clubs or organizations: Not on file     Relationship status: Not on file    Intimate partner violence     Fear of current or ex partner: Not on file     Emotionally abused: Not on file     Physically abused: Not on file     Forced sexual activity: Not on file   Other Topics Concern    Not on file   Social History Narrative    Not on file       Family History   Problem Relation Age of Onset    Diabetes Mother     Hypertension Mother     Heart Disease Father     Cancer Father        Allergies:  Patient has no known allergies. Home Medications:  Prior to Admission medications    Medication Sig Start Date End Date Taking? Authorizing Provider   diclofenac sodium (VOLTAREN) 1 % GEL Apply 2 g topically 4 times daily 10/7/20   ROSSANA Francois   meloxicam [facility]) 7.5 MG tablet Take 1 tablet by mouth 2 times daily (with meals) 10/2/20   aSntos Armendariz PA-C   ibuprofen (ADVIL;MOTRIN) 800 MG tablet TAKE 1 TABLET BY MOUTH TWICE DAILY AS NEEDED FOR PAIN 5/18/20   Santos Armendariz PA-C   atorvastatin (LIPITOR) 40 MG tablet TAKE 1 TABLET BY MOUTH DAILY 3/9/20   Santos Armendariz PA-C   clopidogrel (PLAVIX) 75 MG tablet TAKE 1 TABLET BY MOUTH DAILY 3/9/20   Santos Armendariz PA-C   bisacodyl (DULCOLAX) 5 MG EC tablet TAKE 2 TABS AT 9 AM DAY PRIOR TO COLONOSCOPY 10/8/19   Abe Garcia MD   nitroGLYCERIN (NITROSTAT) 0.4 MG SL tablet Place 0.4 mg under the tongue 3/25/19   Historical Provider, MD   isosorbide mononitrate (IMDUR) 30 MG extended release tablet Take 30 mg by mouth 5/2/19   Historical Provider, MD   metoprolol succinate (TOPROL XL) 25 MG extended release tablet Take 25 mg by mouth daily 8/13/19 9/29/20  Historical Provider, MD   aspirin (ASPIRIN CHILDRENS) 81 MG chewable tablet Take 1 tablet by mouth daily 10/5/18   Anabelle Shell DO       REVIEW OF SYSTEMS    (2-9 systems for level 4, 10 or more for level 5)      Review of Systems   Constitutional: Negative for chills and fever. HENT: Negative for ear pain, hearing loss and sore throat. Eyes: Negative for visual disturbance. Respiratory: Positive for shortness of breath. Cardiovascular: Positive for chest pain. Negative for leg swelling.    Gastrointestinal: Positive status is at baseline. Motor: No abnormal muscle tone. Comments: Spurling's test demonstrates pain with axial loading only radiating to the left shoulder         DIFFERENTIAL  DIAGNOSIS     PLAN (LABS / IMAGING / EKG):  Orders Placed This Encounter   Procedures    XR CHEST PORTABLE    CT CERVICAL SPINE WO CONTRAST    MRI CERVICAL SPINE WO CONTRAST    CTA CHEST W WO CONTRAST    CBC Auto Differential    Comprehensive Metabolic Panel w/ Reflex to MG    Lipase    Troponin    Brain Natriuretic Peptide    Protime-INR    APTT    COVID-19    Troponin    Diet NPO, After Midnight    DIET CARDIAC;    Vital signs per unit routine    Notify physician    Notify physician    Up as tolerated    Telemetry Monitoring    Inpatient consult to Cardiology    Inpatient consult to Neurosurgery    EKG 12 Lead    EKG 12 Lead    Insert peripheral IV    PATIENT STATUS (FROM ED OR OR/PROCEDURAL) Observation       MEDICATIONS ORDERED:  Orders Placed This Encounter   Medications    ondansetron (ZOFRAN) injection 4 mg    nitroGLYCERIN (NITROSTAT) SL tablet 0.4 mg    morphine injection 4 mg    aspirin chewable tablet 81 mg    metoprolol succinate (TOPROL XL) extended release tablet 25 mg    atorvastatin (LIPITOR) tablet 40 mg    clopidogrel (PLAVIX) tablet 75 mg    meloxicam (MOBIC) tablet 7.5 mg    isosorbide mononitrate (IMDUR) extended release tablet 30 mg    sodium chloride flush 0.9 % injection 10 mL    sodium chloride flush 0.9 % injection 10 mL    enoxaparin (LOVENOX) injection 40 mg    acetaminophen (TYLENOL) tablet 650 mg    ondansetron (ZOFRAN) injection 4 mg    oxyCODONE (ROXICODONE) immediate release tablet 5 mg    lidocaine 4 % external patch 1 patch    acetaminophen (TYLENOL) tablet 1,000 mg    iopamidol (ISOVUE-370) 76 % injection 75 mL       DDX: Anuja Maynard is a 56 y.o. male who presents to the emergency department with acute history of chest pain.  Differential diagnosis Result Value Ref Range    Lipase 49 13 - 60 U/L   Troponin   Result Value Ref Range    Troponin, High Sensitivity 6 0 - 22 ng/L    Troponin T NOT REPORTED <0.03 ng/mL    Troponin Interp NOT REPORTED    Troponin   Result Value Ref Range    Troponin, High Sensitivity 6 0 - 22 ng/L    Troponin T NOT REPORTED <0.03 ng/mL    Troponin Interp NOT REPORTED    Brain Natriuretic Peptide   Result Value Ref Range    Pro- <300 pg/mL    BNP Interpretation Pro-BNP Reference Range:    Protime-INR   Result Value Ref Range    Protime 10.1 9.0 - 12.0 sec    INR 1.0    APTT   Result Value Ref Range    PTT 26.2 20.5 - 30.5 sec   COVID-19    Specimen: Other   Result Value Ref Range    SARS-CoV-2          SARS-CoV-2, Rapid Not Detected Not Detected    Source . NASOPHARYNGEAL SWAB     SARS-CoV-2             IMPRESSION: Martinez Knott is a 56 y.o. male who presents to the emergency department with acute history of chest pain. On examination he is afebrile, vital signs demonstrate hypertension and examination is significant for an uncomfortable appearing occasionally diaphoretic male of stated age with significant cardiac risk factors and significant cardiac history. Plan for pain control, admission due to significant risk factors, cardiac work-up. Patient verbalizes understanding and agreement with plan. RADIOLOGY:  No results found. EKG  EKG Interpretation    Interpreted by emergency department physician    Rhythm: normal sinus   Rate: normal  Axis: normal  Ectopy: none  Conduction: normal  ST Segments: no acute change  T Waves: Inversion in V2, mild flattening in lateral leads  Q Waves: none    Clinical Impression: Nonspecific EKG, more T wave flattening present compared to prior EKG on January 3    Gustavo Weir MD    All EKG's are interpreted by the Emergency Department Physician who either signs or co-signs this chart in the absence of a cardiologist.    EMERGENCY DEPARTMENT COURSE:  ED Course as of Jan 09 2302   Sat Jan 09, 2021 2214 Patient complaining of worsening chest pain, will obtain repeat EKG and troponin. Given comorbidities will also perform CTA to eval for dissection. If negative plan for obs admission. [TS]   6271 For the obs resident this is a 47-year-old male with heart score 5 who presented the emergency department for acute left-sided sharp chest pain radiating into the left bicep. Cardiac work-up was nonspecific but given risk factors admitted for cardiology evaluation. Note that the patient has a catheterization already planned for Wednesday. Also admitted for neurosurgical evaluation and cervical MRI given CT findings concerning for possible radiculopathy. [TS]   3139 Requested neurosurgery and cardiology to evaluate patient in AM.    [TS]      ED Course User Index  [TS] Cathleen Mendenhall MD       PROCEDURES:  None    CONSULTS:  IP CONSULT TO CARDIOLOGY  IP CONSULT TO NEUROSURGERY    CRITICAL CARE:  Please see attending note. FINAL IMPRESSION      1. Other chest pain    2. Radiculopathy, cervical          DISPOSITION / PLAN     DISPOSITION        PATIENT REFERRED TO:  No follow-up provider specified. DISCHARGE MEDICATIONS:  New Prescriptions    No medications on file       Cathleen Mendenhall MD  Emergency Medicine Resident    This patient was evaluated in the Emergency Department for symptoms described in the history of present illness. He/she was evaluated in the context of the global COVID-19 pandemic, which necessitated consideration that the patient might be at risk for infection with the SARS-CoV-2 virus that causes COVID-19. Institutional protocols and algorithms that pertain to the evaluation of patients at risk for COVID-19 are in a state of rapid change based on information released by regulatory bodies including the CDC and federal and state organizations. These policies and algorithms were followed during the patient's care in the ED.     (Please note that portions of thisnote were

## 2021-01-11 ENCOUNTER — APPOINTMENT (OUTPATIENT)
Dept: CARDIAC CATH/INVASIVE PROCEDURES | Age: 61
DRG: 287 | End: 2021-01-11
Payer: MEDICARE

## 2021-01-11 VITALS
WEIGHT: 140 LBS | HEIGHT: 65 IN | DIASTOLIC BLOOD PRESSURE: 67 MMHG | HEART RATE: 55 BPM | RESPIRATION RATE: 20 BRPM | TEMPERATURE: 97.7 F | BODY MASS INDEX: 23.32 KG/M2 | SYSTOLIC BLOOD PRESSURE: 117 MMHG | OXYGEN SATURATION: 96 %

## 2021-01-11 LAB
ANION GAP SERPL CALCULATED.3IONS-SCNC: 9 MMOL/L (ref 9–17)
BUN BLDV-MCNC: 18 MG/DL (ref 8–23)
BUN/CREAT BLD: ABNORMAL (ref 9–20)
CALCIUM SERPL-MCNC: 9.1 MG/DL (ref 8.6–10.4)
CHLORIDE BLD-SCNC: 107 MMOL/L (ref 98–107)
CO2: 26 MMOL/L (ref 20–31)
CREAT SERPL-MCNC: 1.22 MG/DL (ref 0.7–1.2)
EKG ATRIAL RATE: 48 BPM
EKG ATRIAL RATE: 51 BPM
EKG ATRIAL RATE: 60 BPM
EKG P AXIS: 39 DEGREES
EKG P AXIS: 58 DEGREES
EKG P AXIS: 74 DEGREES
EKG P-R INTERVAL: 114 MS
EKG P-R INTERVAL: 126 MS
EKG P-R INTERVAL: 132 MS
EKG Q-T INTERVAL: 404 MS
EKG Q-T INTERVAL: 442 MS
EKG Q-T INTERVAL: 460 MS
EKG QRS DURATION: 104 MS
EKG QRS DURATION: 106 MS
EKG QRS DURATION: 98 MS
EKG QTC CALCULATION (BAZETT): 394 MS
EKG QTC CALCULATION (BAZETT): 404 MS
EKG QTC CALCULATION (BAZETT): 423 MS
EKG R AXIS: 17 DEGREES
EKG R AXIS: 35 DEGREES
EKG R AXIS: 71 DEGREES
EKG T AXIS: 60 DEGREES
EKG T AXIS: 65 DEGREES
EKG T AXIS: 72 DEGREES
EKG VENTRICULAR RATE: 48 BPM
EKG VENTRICULAR RATE: 51 BPM
EKG VENTRICULAR RATE: 60 BPM
GFR AFRICAN AMERICAN: >60 ML/MIN
GFR NON-AFRICAN AMERICAN: >60 ML/MIN
GFR SERPL CREATININE-BSD FRML MDRD: ABNORMAL ML/MIN/{1.73_M2}
GFR SERPL CREATININE-BSD FRML MDRD: ABNORMAL ML/MIN/{1.73_M2}
GLUCOSE BLD-MCNC: 95 MG/DL (ref 70–99)
HCT VFR BLD CALC: 41.5 % (ref 40.7–50.3)
HEMOGLOBIN: 13.5 G/DL (ref 13–17)
MCH RBC QN AUTO: 29.7 PG (ref 25.2–33.5)
MCHC RBC AUTO-ENTMCNC: 32.5 G/DL (ref 28.4–34.8)
MCV RBC AUTO: 91.2 FL (ref 82.6–102.9)
NRBC AUTOMATED: 0 PER 100 WBC
PDW BLD-RTO: 12 % (ref 11.8–14.4)
PLATELET # BLD: 223 K/UL (ref 138–453)
PMV BLD AUTO: 10.6 FL (ref 8.1–13.5)
POTASSIUM SERPL-SCNC: 3.9 MMOL/L (ref 3.7–5.3)
RBC # BLD: 4.55 M/UL (ref 4.21–5.77)
SODIUM BLD-SCNC: 142 MMOL/L (ref 135–144)
WBC # BLD: 9.6 K/UL (ref 3.5–11.3)

## 2021-01-11 PROCEDURE — B2151ZZ FLUOROSCOPY OF LEFT HEART USING LOW OSMOLAR CONTRAST: ICD-10-PCS | Performed by: INTERNAL MEDICINE

## 2021-01-11 PROCEDURE — 2709999900 HC NON-CHARGEABLE SUPPLY

## 2021-01-11 PROCEDURE — 2580000003 HC RX 258: Performed by: EMERGENCY MEDICINE

## 2021-01-11 PROCEDURE — 80048 BASIC METABOLIC PNL TOTAL CA: CPT

## 2021-01-11 PROCEDURE — C1769 GUIDE WIRE: HCPCS

## 2021-01-11 PROCEDURE — 93010 ELECTROCARDIOGRAM REPORT: CPT | Performed by: INTERNAL MEDICINE

## 2021-01-11 PROCEDURE — 93459 L HRT ART/GRFT ANGIO: CPT | Performed by: INTERNAL MEDICINE

## 2021-01-11 PROCEDURE — B2111ZZ FLUOROSCOPY OF MULTIPLE CORONARY ARTERIES USING LOW OSMOLAR CONTRAST: ICD-10-PCS | Performed by: INTERNAL MEDICINE

## 2021-01-11 PROCEDURE — B2131ZZ FLUOROSCOPY OF MULTIPLE CORONARY ARTERY BYPASS GRAFTS USING LOW OSMOLAR CONTRAST: ICD-10-PCS | Performed by: INTERNAL MEDICINE

## 2021-01-11 PROCEDURE — 6360000002 HC RX W HCPCS: Performed by: STUDENT IN AN ORGANIZED HEALTH CARE EDUCATION/TRAINING PROGRAM

## 2021-01-11 PROCEDURE — 6360000002 HC RX W HCPCS

## 2021-01-11 PROCEDURE — 6360000002 HC RX W HCPCS: Performed by: EMERGENCY MEDICINE

## 2021-01-11 PROCEDURE — 6370000000 HC RX 637 (ALT 250 FOR IP): Performed by: EMERGENCY MEDICINE

## 2021-01-11 PROCEDURE — 85027 COMPLETE CBC AUTOMATED: CPT

## 2021-01-11 PROCEDURE — C1894 INTRO/SHEATH, NON-LASER: HCPCS

## 2021-01-11 PROCEDURE — 96372 THER/PROPH/DIAG INJ SC/IM: CPT

## 2021-01-11 PROCEDURE — 2500000003 HC RX 250 WO HCPCS

## 2021-01-11 PROCEDURE — 96376 TX/PRO/DX INJ SAME DRUG ADON: CPT

## 2021-01-11 PROCEDURE — 4A023N7 MEASUREMENT OF CARDIAC SAMPLING AND PRESSURE, LEFT HEART, PERCUTANEOUS APPROACH: ICD-10-PCS | Performed by: INTERNAL MEDICINE

## 2021-01-11 PROCEDURE — 1200000000 HC SEMI PRIVATE

## 2021-01-11 PROCEDURE — 36415 COLL VENOUS BLD VENIPUNCTURE: CPT

## 2021-01-11 PROCEDURE — 6360000004 HC RX CONTRAST MEDICATION

## 2021-01-11 PROCEDURE — C1760 CLOSURE DEV, VASC: HCPCS

## 2021-01-11 RX ORDER — ACETAMINOPHEN 325 MG/1
650 TABLET ORAL EVERY 4 HOURS PRN
Status: DISCONTINUED | OUTPATIENT
Start: 2021-01-11 | End: 2021-01-11 | Stop reason: HOSPADM

## 2021-01-11 RX ORDER — SODIUM CHLORIDE 0.9 % (FLUSH) 0.9 %
10 SYRINGE (ML) INJECTION EVERY 12 HOURS SCHEDULED
Status: DISCONTINUED | OUTPATIENT
Start: 2021-01-11 | End: 2021-01-11 | Stop reason: HOSPADM

## 2021-01-11 RX ORDER — POLYETHYLENE GLYCOL 3350 17 G/17G
17 POWDER, FOR SOLUTION ORAL DAILY
Status: DISCONTINUED | OUTPATIENT
Start: 2021-01-11 | End: 2021-01-11 | Stop reason: HOSPADM

## 2021-01-11 RX ORDER — SODIUM CHLORIDE 9 MG/ML
INJECTION, SOLUTION INTRAVENOUS CONTINUOUS
Status: DISCONTINUED | OUTPATIENT
Start: 2021-01-11 | End: 2021-01-11 | Stop reason: HOSPADM

## 2021-01-11 RX ORDER — ONDANSETRON 2 MG/ML
4 INJECTION INTRAMUSCULAR; INTRAVENOUS EVERY 6 HOURS PRN
Status: DISCONTINUED | OUTPATIENT
Start: 2021-01-11 | End: 2021-01-11 | Stop reason: HOSPADM

## 2021-01-11 RX ORDER — SODIUM CHLORIDE 0.9 % (FLUSH) 0.9 %
10 SYRINGE (ML) INJECTION PRN
Status: DISCONTINUED | OUTPATIENT
Start: 2021-01-11 | End: 2021-01-11 | Stop reason: HOSPADM

## 2021-01-11 RX ADMIN — ENOXAPARIN SODIUM 40 MG: 40 INJECTION SUBCUTANEOUS at 07:57

## 2021-01-11 RX ADMIN — ASPIRIN 81 MG: 81 TABLET, CHEWABLE ORAL at 07:58

## 2021-01-11 RX ADMIN — METOPROLOL SUCCINATE 25 MG: 25 TABLET, FILM COATED, EXTENDED RELEASE ORAL at 07:57

## 2021-01-11 RX ADMIN — ISOSORBIDE MONONITRATE 30 MG: 30 TABLET ORAL at 07:58

## 2021-01-11 RX ADMIN — CLOPIDOGREL 75 MG: 75 TABLET, FILM COATED ORAL at 07:58

## 2021-01-11 RX ADMIN — ATORVASTATIN CALCIUM 40 MG: 80 TABLET, FILM COATED ORAL at 07:58

## 2021-01-11 RX ADMIN — ONDANSETRON 4 MG: 2 INJECTION INTRAMUSCULAR; INTRAVENOUS at 07:57

## 2021-01-11 RX ADMIN — SODIUM CHLORIDE, PRESERVATIVE FREE 10 ML: 5 INJECTION INTRAVENOUS at 07:58

## 2021-01-11 RX ADMIN — OXYCODONE HYDROCHLORIDE 5 MG: 5 TABLET ORAL at 15:52

## 2021-01-11 RX ADMIN — MELOXICAM 7.5 MG: 7.5 TABLET ORAL at 17:15

## 2021-01-11 RX ADMIN — ACETAMINOPHEN 650 MG: 325 TABLET ORAL at 02:49

## 2021-01-11 RX ADMIN — MELOXICAM 7.5 MG: 7.5 TABLET ORAL at 07:57

## 2021-01-11 RX ADMIN — OXYCODONE HYDROCHLORIDE 5 MG: 5 TABLET ORAL at 02:49

## 2021-01-11 RX ADMIN — ONDANSETRON 4 MG: 2 INJECTION INTRAMUSCULAR; INTRAVENOUS at 13:57

## 2021-01-11 RX ADMIN — ONDANSETRON 4 MG: 2 INJECTION INTRAMUSCULAR; INTRAVENOUS at 17:15

## 2021-01-11 ASSESSMENT — PAIN SCALES - GENERAL
PAINLEVEL_OUTOF10: 8
PAINLEVEL_OUTOF10: 4
PAINLEVEL_OUTOF10: 8
PAINLEVEL_OUTOF10: 6
PAINLEVEL_OUTOF10: 6

## 2021-01-11 ASSESSMENT — PAIN DESCRIPTION - PROGRESSION: CLINICAL_PROGRESSION: NOT CHANGED

## 2021-01-11 ASSESSMENT — PAIN DESCRIPTION - LOCATION: LOCATION: CHEST

## 2021-01-11 ASSESSMENT — PAIN DESCRIPTION - PAIN TYPE: TYPE: CHRONIC PAIN

## 2021-01-11 NOTE — PROGRESS NOTES
Patient admitted, consent signed, all questions answered. Pt ready for procedure. Bed in low position, call light to reach with side rails up 2 of 2.  johan groins clipped. No family  at bedside with patient.

## 2021-01-11 NOTE — PROCEDURES
Procedure Summary      Severe native vessel disease of left main, LAD and LCX. Patent RCA stents with mild disease. Patent SVG-LAD and SVG-OM. Similar to cath done in 2017. Normal left ventricular systolic function. Estimated blood loss 15 ml. Recommendations      Medical therapy as needed.       Signature      ----------------------------------------------------------------   Electronically signed by Tarik De La Paz(Performing Physician)   on 01/11/2021 15:19

## 2021-01-11 NOTE — PROGRESS NOTES
CDU Daily Progress Note  Attending Physician       Pt Name: Venetta Bosworth  MRN: [id number]  [place] 1960  Date of evaluation: 1/11/21    I performed a history and physical examination of the patient and discussed management with the resident. I reviewed the residents note and agree with the documented findings and plan of care. Any areas of disagreement are noted on the chart. I was personally present for the key portions of any procedures. I have documented in the chart those procedures where I was not present during the key portions. I have reviewed the emergency nurses triage note. I agree with the chief complaint, past medical history, past surgical history, allergies, medications, social and family history as documented unless otherwise noted below. Documentation of the HPI, Physical Exam and Medical Decision Making performed by medical students or scribes is based on my personal performance of the HPI, PE and MDM. For Physician Assistant/ Nurse Practitioner cases/documentation I have personally evaluated this patient and have completed at least one if not all key elements of the E/M (history, physical exam, and MDM). Additional findings are as noted. The Family History, Social History and Review of Systems are unchanged from the previous day. No significant events overnight. Going for cath today. MRI C spine showed multiple level spinal stenosis, foraminal narrowing minor to severe. NS consulted will FU outpt.     Arlene Mcleod MD  Attending Physician  Critical Decision Unit

## 2021-01-11 NOTE — PROGRESS NOTES
OBS/CDU   RESIDENT NOTE    Patients PCP is:  Tracy Gonzalez PA-C    SUBJECTIVE    Patient states that his chest pain has improved, describes as 4 out of 10, radiating to the left shoulder, pressure-like. Does have pain with palpation of chest.  Denies any shortness of breath or diaphoresis at this time. Patient was scheduled for catheterization later in the week, will be obtaining cardiac cath later today. No acute events overnight. Has been able to tolerate a full diet without nausea or vomiting, n.p.o. since midnight. He has been urinating on his own and is passing flatus. He denies fever, chills, nausea, vomiting, chest pain, shortness of breath, abdominal pain, focal weakness, numbness, tingling, urinary/bowel symptoms, vision changes, visual hallucinations, or headache. PHYSICAL EXAM      General: NAD, A&Ox3  Heent: EOMI, PERRLA  Neck: Supple, No JVD  Cardiovascular: RRR, Normal S1/S2  Pulmonary: CTA-B, No SOB  Abdomen: Soft, nontender, nondistended, +bowel sounds  Extremities: +2/4 distal pulses, No swelling  Neuro/Psych: No numbness or tingling, mentating at baseline    PERTINENT TEST /EXAMS      I have reviewed all available laboratory results. MEDICATIONS CURRENT         ondansetron (ZOFRAN) injection 4 mg, Q4H      aspirin chewable tablet 81 mg, Daily      metoprolol succinate (TOPROL XL) extended release tablet 25 mg, Daily      atorvastatin (LIPITOR) tablet 40 mg, Daily      clopidogrel (PLAVIX) tablet 75 mg, Daily      meloxicam (MOBIC) tablet 7.5 mg, BID WC      isosorbide mononitrate (IMDUR) extended release tablet 30 mg, Daily      sodium chloride flush 0.9 % injection 10 mL, 2 times per day      sodium chloride flush 0.9 % injection 10 mL, PRN      enoxaparin (LOVENOX) injection 40 mg, Daily      acetaminophen (TYLENOL) tablet 650 mg, Q4H PRN      oxyCODONE (ROXICODONE) immediate release tablet 5 mg, Q4H PRN        All medication charted and reviewed.     CONSULTS      IP

## 2021-01-11 NOTE — PROGRESS NOTES
[facility]  Progress Note                   Date:   1/11/2021  Patient name: Damaso Lozada  Date of admission:  1/9/2021  7:02 PM  MRN:   [id number]  YOB: 1960  PCP: Teresa Chamberlain PA-C    Reason for Admission: Chest pain [R07.9]    Subjective:       Clinical Changes /Abnormalities: Pt. Seen & examined alone in room. Denies any CP or SOB presently. No AM labs. Tele reviewed - SR/SA. NPO for cath. Review of Systems    Medications:   Scheduled Meds:   ondansetron  4 mg Intravenous Q4H    aspirin  81 mg Oral Daily    metoprolol succinate  25 mg Oral Daily    atorvastatin  40 mg Oral Daily    clopidogrel  75 mg Oral Daily    meloxicam  7.5 mg Oral BID WC    isosorbide mononitrate  30 mg Oral Daily    sodium chloride flush  10 mL Intravenous 2 times per day    enoxaparin  40 mg Subcutaneous Daily     Continuous Infusions:  CBC:   Recent Labs     01/09/21 1944   WBC 10.1   HGB 13.2        BMP:    Recent Labs     01/09/21 1944      K 3.6*      CO2 21   BUN 14   CREATININE 0.83   GLUCOSE 107*     Hepatic:  Recent Labs     01/09/21 1944   AST 17   ALT 15   BILITOT 0.59   ALKPHOS 48     Troponin:   Recent Labs     01/09/21 1944 01/09/21  2214   TROPHS 6 6     BNP: No results for input(s): BNP in the last 72 hours. Lipids: No results for input(s): CHOL, HDL in the last 72 hours. Invalid input(s): LDLCALCU  INR:   Recent Labs     01/09/21 1944   INR 1.0       Objective:   Vitals: BP (!) 141/74   Pulse 53   Temp 98.7 °F (37.1 °C) (Oral)   Resp 16   Ht 5' 5\" (1.651 m)   Wt 140 lb (63.5 kg)   SpO2 96%   BMI 23.30 kg/m²   General appearance: alert and cooperative with exam  HEENT: Head: Normocephalic, no lesions, without obvious abnormality.   Neck:no JVD, trachea midline, no adenopathy  Lungs: Clear to auscultation  Heart: Regular rate and rhythm, s1/s2 auscultated, no murmurs  Abdomen: soft, non-tender, bowel sounds active  Extremities: no edema  Neurologic: not done    stress test on 1/5/2021 which showed mid inferior reversible defect indicative of ischemia-intermediate risk, was scheduled for cardiac catheterization on upcoming Wednesday with his cardiologist Dr. Lyssa Russo Cardiac Angiography:.  Date: 2/2017   Findings: LMCA: Chronic occlusion 100 % .    LAD: Chronic occlusion 100 % .     LCx: Chronic occlusion 100 % .    RCA: patent stents in the proximal, mid and distal vessel. The distal RCA gives right to left collaterals to the distal LCX and mid LAD     Cardiac Grafts   - Mikel Quoc is a Vein graft that originates at the Aorta Left and attaches to the Mid LAD. mild <20% stenosis, patent graft      -  There is a Vein graft that originates at the Aorta Left and attaches to the 1st Ob Aleja. patent, with 30-40% proximal lesion    Assessment / Acute Cardiac Problems:   1. Chest pain  2. Abnormal OP stress test  3. Hx of CAD s/p CABG x4 and multiple stents  4. HTN  5. HLP  6. Anxiety    Patient Active Problem List:     CAD (coronary artery disease) s/p CABGx4, 10 stents     Anxiety     Chest pain with high risk of acute coronary syndrome     Unstable gait     DJD (degenerative joint disease), lumbosacral     Chest wall pain, chronic     Psychophysiological insomnia     Frequency of urination     Urinary hesitancy     Nocturia more than twice per night     Mixed hyperlipidemia     Hypertension     Radicular syndrome of left leg     Primary osteoarthritis of both knees     Herpes zoster keratitis     Herpes zoster keratoconjunctivitis     Ganglionitis, gasserian     Ocular hypertension     Unstable angina (HCC)     Contusion of left lower leg     Reactive depression     Atherosclerosis of coronary artery bypass graft(s) without angina pectoris     Opioid withdrawal (HCC)     Bilateral hearing loss     Polyp of descending colon     History of colon polyps     Chest pain      Plan of Treatment:   1. NPO for cath today.  I have discussed risks (including but not limited to vascular injury, infection, hematoma, contrast induced kidney dysfunction, CVA and MI), benefits, alternatives in detail. All questions answered. Patient agrees to proceed. 2. Cotninue PO ASA, statin, Plavix, Imdur, & BB  3. Further POC following cath.     Electronically signed by CJ Villagran CNP on 1/11/2021 at 9:21 [facility]8 [facility].  [phone]
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
## 2021-02-12 ENCOUNTER — OFFICE VISIT (OUTPATIENT)
Dept: PRIMARY CARE CLINIC | Age: 61
End: 2021-02-12
Payer: MEDICARE

## 2021-02-12 VITALS
TEMPERATURE: 97.9 F | OXYGEN SATURATION: 98 % | DIASTOLIC BLOOD PRESSURE: 64 MMHG | HEART RATE: 52 BPM | SYSTOLIC BLOOD PRESSURE: 112 MMHG | WEIGHT: 150.6 LBS | BODY MASS INDEX: 25.06 KG/M2

## 2021-02-12 DIAGNOSIS — I25.10 CORONARY ARTERY DISEASE INVOLVING NATIVE CORONARY ARTERY OF NATIVE HEART, ANGINA PRESENCE UNSPECIFIED: ICD-10-CM

## 2021-02-12 DIAGNOSIS — M79.671 RIGHT FOOT PAIN: Primary | ICD-10-CM

## 2021-02-12 DIAGNOSIS — E66.3 OVERWEIGHT (BMI 25.0-29.9): ICD-10-CM

## 2021-02-12 DIAGNOSIS — Z12.5 PROSTATE CANCER SCREENING: ICD-10-CM

## 2021-02-12 DIAGNOSIS — M54.12 CERVICAL RADICULOPATHY: ICD-10-CM

## 2021-02-12 DIAGNOSIS — Z76.0 MEDICATION REFILL: ICD-10-CM

## 2021-02-12 DIAGNOSIS — M25.521 RIGHT ELBOW PAIN: ICD-10-CM

## 2021-02-12 PROCEDURE — 3017F COLORECTAL CA SCREEN DOC REV: CPT | Performed by: PHYSICIAN ASSISTANT

## 2021-02-12 PROCEDURE — 99214 OFFICE O/P EST MOD 30 MIN: CPT | Performed by: PHYSICIAN ASSISTANT

## 2021-02-12 PROCEDURE — G8419 CALC BMI OUT NRM PARAM NOF/U: HCPCS | Performed by: PHYSICIAN ASSISTANT

## 2021-02-12 PROCEDURE — G8484 FLU IMMUNIZE NO ADMIN: HCPCS | Performed by: PHYSICIAN ASSISTANT

## 2021-02-12 PROCEDURE — G8427 DOCREV CUR MEDS BY ELIG CLIN: HCPCS | Performed by: PHYSICIAN ASSISTANT

## 2021-02-12 PROCEDURE — 1036F TOBACCO NON-USER: CPT | Performed by: PHYSICIAN ASSISTANT

## 2021-02-12 RX ORDER — MELOXICAM 7.5 MG/1
7.5 TABLET ORAL 2 TIMES DAILY WITH MEALS
Qty: 60 TABLET | Refills: 1 | Status: SHIPPED | OUTPATIENT
Start: 2021-02-12 | End: 2022-02-02

## 2021-02-12 RX ORDER — ATORVASTATIN CALCIUM 40 MG/1
40 TABLET, FILM COATED ORAL DAILY
Qty: 90 TABLET | Refills: 0 | Status: SHIPPED | OUTPATIENT
Start: 2021-02-12

## 2021-02-12 RX ORDER — CLOPIDOGREL BISULFATE 75 MG/1
75 TABLET ORAL DAILY
Qty: 90 TABLET | Refills: 1 | Status: SHIPPED | OUTPATIENT
Start: 2021-02-12

## 2021-02-12 NOTE — PROGRESS NOTES
Visit Information    Have you changed or started any medications since your last visit including any over-the-counter medicines, vitamins, or herbal medicines? no   Are you having any side effects from any of your medications? -  no  Have you stopped taking any of your medications? Is so, why? -  no    Have you seen any other physician or provider since your last visit? Yes - Records Obtained  Have you had any other diagnostic tests since your last visit? Yes - Records Obtained  Have you been seen in the emergency room and/or had an admission to a hospital since we last saw you? Yes - Records Obtained  Have you had your routine dental cleaning in the past 6 months? no    Have you activated your [employer] account? If not, what are your barriers?  Yes     Patient Care Team:  Bert Baker PA-C as PCP - General  Bert Baker PA-C as PCP [facility]  Cricket Heath MD as Consulting Physician (Gastroenterology)  Kvng Beckham MD as Consulting Physician (Cardiology)    Medical History Review  Past Medical, Family, and Social History reviewed and does not contribute to the patient presenting condition    Health Maintenance   Topic Date Due    Shingles Vaccine (1 of 2) 05/11/2008    Lipid screen  02/24/2018    A1C test (Diabetic or Prediabetic)  01/23/2019    Annual Wellness Visit (AWV)  06/19/2019    Flu vaccine (1) 09/01/2020    Colon cancer screen colonoscopy  10/15/2024    DTaP/Tdap/Td vaccine (2 - Td) 04/04/2027    Hepatitis C screen  Addressed    HIV screen  Addressed    Hepatitis A vaccine  Aged Out    Hepatitis B vaccine  Aged Out    Hib vaccine  Aged Out    Meningococcal (ACWY) vaccine  Aged Out    Pneumococcal 0-64 years Vaccine  Aged Out

## 2021-02-12 NOTE — PROGRESS NOTES
[facility]  [id number] [facility] [address]  Dept: [phone]  Dept Fax: [phone]    Office Progress/Follow Up Note    Date of patient's visit: 2/12/2021    Patient's Name:  Vlad Padilla YOB: 1960            Patient Care Team:  Hanny Kaminski PA-C as PCP - General  Hanny Kaminski PA-C as PCP - [facility]  Otto Herrera MD as Consulting Physician (Gastroenterology)  Isabel Fraga MD as Consulting Physician (Cardiology)  ================================================================    REASON FOR VISIT/CHIEF COMPLAINT:  Foot Problem (right foot, onset 2 weeks ago)    HISTORY OF PRESENTING ILLNESS:  History was obtained from: patient. Vlad Padilla is a 56 y.o. is here for follow-up for elbow pain, medication refill, complaining of foot pain. Patient states he is compliant with medications. Foot Pain   The pain is present in the right toes and right foot. This is a new problem. The current episode started 1 to 4 weeks ago. There has been no history of extremity trauma. The problem occurs constantly. The problem has been unchanged. The quality of the pain is described as aching. Associated symptoms include a limited range of motion and stiffness. Pertinent negatives include no fever, joint locking, joint swelling, numbness or tingling. He has tried NSAIDS for the symptoms. The treatment provided mild relief. Patient was seen by podiatry in 2/2020 for similar concerns, per podiatry office note he received a steroid injection in foot, \"do not remember appointment or injection\". Patient was provided podiatry office information and encouraged to call and schedule appointment for follow-up. Patient was seen by orthopedic for elbow pain in 10/2020, EMG and physical therapy ordered, patient confirms \"did not complete\".   Encourage patient to call and schedule EMG and physical therapy to complete necessary evaluation, encourage patient to call and follow-up with orthopedic afterwards, patient voiced understanding, patient request medication refill. Patient was seen by cardiology in 1/2021, was evaluated by stress test for chest pain, patient did require cardiac cath to be completed for coronary artery disease. Patient request medication refill. Patient will be follow-up with neurosurgery on 2/17/2021 for cervical radiculopathy. Patient blood pressure is controlled in office. Patient weight is stable. Labs reviewed, patient states \"got labs done at [address] [place] [address] on same day he had x-rays completed\", no record of results, reprinted necessary labs encourage patient to get done in the next few weeks. Health maintenance reviewed. Medication reviewed and prescribed. Patient was informed that PCP will be residing in April 2021, encourage patient to get established with another provider in office or contact PCP office to be referred to another primary care patient, patient voiced understanding.     HPI    Patient Active Problem List   Diagnosis    CAD (coronary artery disease) s/p CABGx4, 10 stents    Anxiety    Chest pain with high risk of acute coronary syndrome    Unstable gait    DJD (degenerative joint disease), lumbosacral    Chest wall pain, chronic    Psychophysiological insomnia    Frequency of urination    Urinary hesitancy    Nocturia more than twice per night    Mixed hyperlipidemia    Hypertension    Radicular syndrome of left leg    Primary osteoarthritis of both knees    Ganglionitis, gasserian    Ocular hypertension    Unstable angina (HCC)    Reactive depression    Atherosclerosis of coronary artery bypass graft(s) without angina pectoris    Bilateral hearing loss    Polyp of descending colon    History of colon polyps    Chest pain       Health Maintenance Due   Topic Date Due    Shingles Vaccine (1 of 2) 05/11/2008    Lipid screen 2018    A1C test (Diabetic or Prediabetic)  2019    Annual Wellness Visit (AWV)  2019       No Known Allergies      Current Outpatient Medications   Medication Sig Dispense Refill    atorvastatin (LIPITOR) 40 MG tablet Take 1 tablet by mouth daily 90 tablet 0    clopidogrel (PLAVIX) 75 MG tablet Take 1 tablet by mouth daily 90 tablet 1    meloxicam (MOBIC) 7.5 MG tablet Take 1 tablet by mouth 2 times daily (with meals) 60 tablet 1    diclofenac sodium (VOLTAREN) 1 % GEL Apply 2 g topically 4 times daily 1 Tube 0    ibuprofen (ADVIL;MOTRIN) 800 MG tablet TAKE 1 TABLET BY MOUTH TWICE DAILY AS NEEDED FOR PAIN 30 tablet 2    nitroGLYCERIN (NITROSTAT) 0.4 MG SL tablet Place 0.4 mg under the tongue      metoprolol succinate (TOPROL XL) 25 MG extended release tablet Take 25 mg by mouth daily      aspirin (ASPIRIN CHILDRENS) 81 MG chewable tablet Take 1 tablet by mouth daily 30 tablet 3     No current facility-administered medications for this visit. Social History     Tobacco Use    Smoking status: Former Smoker     Packs/day: 1.00     Years: 20.00     Pack years: 20.00     Types: Cigarettes     Quit date: 10/24/1999     Years since quittin.3    Smokeless tobacco: Never Used   Substance Use Topics    Alcohol use: No    Drug use: No       Family History   Problem Relation Age of Onset    Diabetes Mother     Hypertension Mother     Heart Disease Father     Cancer Father         REVIEW OFSYSTEMS:  Review of Systems   Constitutional: Negative for chills and fever. HENT: Negative for congestion. Respiratory: Negative for cough, shortness of breath and wheezing. Cardiovascular: Negative for chest pain. Gastrointestinal: Negative for constipation, diarrhea, nausea and vomiting. Genitourinary: Negative for dysuria, frequency and urgency. Musculoskeletal: Positive for arthralgias and stiffness. Negative for back pain, myalgias and neck pain.    Neurological: Negative for tingling, numbness and headaches. PHYSICAL EXAM:  Vitals:    02/12/21 0939   BP: 112/64   Site: Right Upper Arm   Position: Sitting   Cuff Size: Medium Adult   Pulse: 52   Temp: 97.9 °F (36.6 °C)   TempSrc: Temporal   SpO2: 98%   Weight: 150 lb 9.6 oz (68.3 kg)     BP Readings from Last 3 Encounters:   02/12/21 112/64   01/11/21 117/67   02/18/20 136/76        Physical Exam  Vitals signs reviewed. Constitutional:       Appearance: Normal appearance. He is well-developed, well-groomed and overweight. HENT:      Head: Normocephalic and atraumatic. Right Ear: External ear normal.      Left Ear: External ear normal.      Nose: Nose normal.   Eyes:      General: Lids are normal.      Conjunctiva/sclera: Conjunctivae normal.      Pupils: Pupils are equal, round, and reactive to light. Cardiovascular:      Rate and Rhythm: Normal rate and regular rhythm. Heart sounds: Normal heart sounds. Pulmonary:      Effort: Pulmonary effort is normal.      Breath sounds: Normal breath sounds. Abdominal:      Palpations: Abdomen is soft. There is no mass. Tenderness: There is no abdominal tenderness. Musculoskeletal:         General: No tenderness. Feet:    Skin:     General: Skin is warm and dry. Neurological:      Mental Status: He is alert and oriented to person, place, and time. Psychiatric:         Behavior: Behavior is cooperative.            DIAGNOSTIC FINDINGS:  CBC:  Lab Results   Component Value Date    WBC 9.6 01/11/2021    HGB 13.5 01/11/2021     01/11/2021       BMP:    Lab Results   Component Value Date     01/11/2021    K 3.9 01/11/2021     01/11/2021    CO2 26 01/11/2021    BUN 18 01/11/2021    CREATININE 1.22 01/11/2021    GLUCOSE 95 01/11/2021       HEMOGLOBIN A1C:   Lab Results   Component Value Date    LABA1C 5.7 01/23/2018       FASTING LIPID PANEL:  Lab Results   Component Value Date    CHOL 105 02/24/2017    HDL 37 (L) 02/24/2017    TRIG 70 02/24/2017 ASSESSMENT AND PLAN:  Brandt Thomas was seen today for foot problem. Diagnoses and all orders for this visit:    Right foot pain    Right elbow pain  -     meloxicam (MOBIC) 7.5 MG tablet; Take 1 tablet by mouth 2 times daily (with meals)    Coronary artery disease involving native coronary artery of native heart, angina presence unspecified  -     atorvastatin (LIPITOR) 40 MG tablet; Take 1 tablet by mouth daily  -     clopidogrel (PLAVIX) 75 MG tablet; Take 1 tablet by mouth daily    Cervical radiculopathy    Overweight (BMI 25.0-29. 9)    Prostate cancer screening    Medication refill  -     atorvastatin (LIPITOR) 40 MG tablet; Take 1 tablet by mouth daily  -     clopidogrel (PLAVIX) 75 MG tablet; Take 1 tablet by mouth daily  -     meloxicam (MOBIC) 7.5 MG tablet; Take 1 tablet by mouth 2 times daily (with meals)      FOLLOW UP AND INSTRUCTIONS:  Return if symptoms worsen or fail to improve. · Discussed use, benefit, and side effects of prescribed medications. Barriers to medication compliance addressed. All patient questions answered. Pt voiced understanding. · Patient given educational materials - see patient instructions    Electronically signed by Beatriz Friedman PA-C on 2/12/21 at 10:28 AM EST    This note is created with the assistance of a speech-recognition program. While intending to generate a document that actually reflects the content of the visit, the document can still have some mistakes which may not have been identified and corrected by editing.

## 2021-02-12 NOTE — PATIENT INSTRUCTIONS
· Restart Meloxicam  · Call and schedule follow up with podiatry for right foot concerns    [facility]   [address], [address]   [phone]    · Call and schedule follow up with orthopedic for elbow concerns    [id number] [facility]   [address]   [phone]

## 2021-02-21 PROBLEM — B02.33 HERPES ZOSTER KERATOCONJUNCTIVITIS: Status: RESOLVED | Noted: 2017-02-16 | Resolved: 2021-02-21

## 2021-02-21 PROBLEM — F11.93 OPIOID WITHDRAWAL (HCC): Status: RESOLVED | Noted: 2018-01-23 | Resolved: 2021-02-21

## 2021-02-21 PROBLEM — S80.12XA CONTUSION OF LEFT LOWER LEG: Status: RESOLVED | Noted: 2017-08-02 | Resolved: 2021-02-21

## 2021-02-21 PROBLEM — B02.33 HERPES ZOSTER KERATITIS: Status: RESOLVED | Noted: 2017-02-13 | Resolved: 2021-02-21

## 2021-02-21 ASSESSMENT — ENCOUNTER SYMPTOMS
COUGH: 0
NAUSEA: 0
SHORTNESS OF BREATH: 0
WHEEZING: 0
CONSTIPATION: 0
VOMITING: 0
DIARRHEA: 0
BACK PAIN: 0

## 2021-03-26 ENCOUNTER — HOSPITAL ENCOUNTER (EMERGENCY)
Age: 61
Discharge: HOME | End: 2021-03-26
Attending: EMERGENCY MEDICINE
Payer: MEDICARE

## 2021-03-26 ENCOUNTER — APPOINTMENT (OUTPATIENT)
Dept: GENERAL RADIOLOGY | Age: 61
End: 2021-03-26
Payer: MEDICARE

## 2021-03-26 VITALS
HEART RATE: 62 BPM | SYSTOLIC BLOOD PRESSURE: 150 MMHG | BODY MASS INDEX: 24.16 KG/M2 | TEMPERATURE: 97.9 F | OXYGEN SATURATION: 98 % | RESPIRATION RATE: 16 BRPM | WEIGHT: 145 LBS | DIASTOLIC BLOOD PRESSURE: 63 MMHG | HEIGHT: 65 IN

## 2021-03-26 DIAGNOSIS — M25.50 ARTHRALGIA OF MULTIPLE JOINTS: ICD-10-CM

## 2021-03-26 DIAGNOSIS — S60.222A CONTUSION OF LEFT HAND, INITIAL ENCOUNTER: Primary | ICD-10-CM

## 2021-03-26 PROCEDURE — 73130 X-RAY EXAM OF HAND: CPT

## 2021-03-26 PROCEDURE — 6370000000 HC RX 637 (ALT 250 FOR IP): Performed by: EMERGENCY MEDICINE

## 2021-03-26 PROCEDURE — 99282 EMERGENCY DEPT VISIT SF MDM: CPT

## 2021-03-26 RX ORDER — ACETAMINOPHEN 500 MG
1000 TABLET ORAL ONCE
Status: COMPLETED | OUTPATIENT
Start: 2021-03-26 | End: 2021-03-26

## 2021-03-26 RX ORDER — ACETAMINOPHEN 500 MG
1000 TABLET ORAL EVERY 6 HOURS PRN
Qty: 60 TABLET | Refills: 0 | Status: SHIPPED | OUTPATIENT
Start: 2021-03-26

## 2021-03-26 RX ADMIN — ACETAMINOPHEN 1000 MG: 500 TABLET ORAL at 11:19

## 2021-03-26 NOTE — ED PROVIDER NOTES
EMERGENCY DEPARTMENT ENCOUNTER    Pt Name: Rebecca Horn  MRN: [id number]  [place] 1960  Date of evaluation: 3/26/21  CHIEF COMPLAINT       Chief Complaint   Patient presents with    Hand Injury     HISTORY OF PRESENT ILLNESS     Hand Problem  Location:  Hand  Hand location:  L hand  Injury: yes    Time since incident:  1 day  Mechanism of injury comment:  Hit left hand with hammer accidently  Pain details:     Quality:  Aching    Radiates to:  Does not radiate    Severity:  Moderate    Onset quality:  Sudden    Duration:  1 day    Timing:  Constant    Progression:  Unchanged  Handedness:  Right-handed  Relieved by:  Nothing  Worsened by:  Nothing  Ineffective treatments:  None tried  Associated symptoms: swelling    Associated symptoms: no decreased range of motion, no fever, no muscle weakness and no numbness            REVIEW OF SYSTEMS     Review of Systems   Constitutional: Negative for fever. All other systems reviewed and are negative.     PASTMEDICAL HISTORY     Past Medical History:   Diagnosis Date    Anxiety     Atherosclerosis of autologous vein coronary artery bypass graft 3/29/2007    CAD (coronary artery disease)     Chronic back pain     Congenital heart disease     Depression     Headache(784.0)     Herpes zoster keratoconjunctivitis 2/16/2017    History of colon polyps 10/15/2019    tubular adenoma x1    Hx of heart artery stent     pt states he has heart stents x 10    Hyperlipidemia 5/30/2014    Hypertension     LONG TERM ANTICOAGULENT USE     MI (myocardial infarction) ([facility] 2007    Opioid withdrawal ([facility] 1/23/2018    Osteoarthritis     Radicular syndrome of left leg 11/15/2016    Restless legs syndrome     S/P CABG x 4     Shingles outbreak 02/10/2017    in left eye      Past Problem List  Patient Active Problem List   Diagnosis Code    CAD (coronary artery disease) s/p CABGx4, 10 stents I25.10    Anxiety F41.9    Chest pain with high risk of acute coronary syndrome R07.9    Unstable gait R26.81    DJD (degenerative joint disease), lumbosacral M47.817    Chest wall pain, chronic R07.89, G89.29    Psychophysiological insomnia F51.04    Frequency of urination R35.0    Urinary hesitancy R39.11    Nocturia more than twice per night R35.1    Mixed hyperlipidemia E78.2    Hypertension I10    Radicular syndrome of left leg M54.10    Primary osteoarthritis of both knees M17.0    Ganglionitis, gasserian G50.0    Ocular hypertension H40.059    Unstable angina (HCC) I20.0    Reactive depression F32.9    Atherosclerosis of coronary artery bypass graft(s) without angina pectoris I25.810    Bilateral hearing loss H91.93    Polyp of descending colon K63.5    History of colon polyps Z86.010    Chest pain R07.9     SURGICAL HISTORY       Past Surgical History:   Procedure Laterality Date    APPENDECTOMY      CARDIAC CATHETERIZATION      CARDIAC CATHETERIZATION      pt states he has heart stents x 10    COLONOSCOPY N/A 10/15/2019    COLONOSCOPY POLYPECTOMY SNARE/COLD performed by Rebekah Hernandez MD at [facility].  2008, 2009    CABG x 4    UPPER GASTROINTESTINAL ENDOSCOPY       CURRENT MEDICATIONS       Discharge Medication List as of 3/26/2021 11:57 AM      CONTINUE these medications which have NOT CHANGED    Details   atorvastatin (LIPITOR) 40 MG tablet Take 1 tablet by mouth daily, Disp-90 tablet, R-0Normal      clopidogrel (PLAVIX) 75 MG tablet Take 1 tablet by mouth daily, Disp-90 tablet, R-1Normal      meloxicam (MOBIC) 7.5 MG tablet Take 1 tablet by mouth 2 times daily (with meals), Disp-60 tablet, R-1Normal      diclofenac sodium (VOLTAREN) 1 % GEL Apply 2 g topically 4 times daily, Topical, 4 TIMES DAILY Starting Wed 10/7/2020, Disp-1 Tube,R-0, Normal      ibuprofen (ADVIL;MOTRIN) 800 MG tablet TAKE 1 TABLET BY MOUTH TWICE DAILY AS NEEDED FOR PAIN, Disp-30 tablet,R-2Normal      nitroGLYCERIN (NITROSTAT) 0.4 MG SL tablet Place 0.4 mg under the tongueHistorical Med      metoprolol succinate (TOPROL XL) 25 MG extended release tablet Take 25 mg by mouth dailyHistorical Med      aspirin (ASPIRIN CHILDRENS) 81 MG chewable tablet Take 1 tablet by mouth daily, Disp-30 tablet, R-3Normal           ALLERGIES     has No Known Allergies. FAMILY HISTORY     He indicated that his mother is alive. He indicated that his father is . SOCIAL HISTORY       Social History     Tobacco Use    Smoking status: Former Smoker     Packs/day: 1.00     Years: 20.00     Pack years: 20.00     Types: Cigarettes     Quit date: 10/24/1999     Years since quittin.4    Smokeless tobacco: Never Used   Substance Use Topics    Alcohol use: No    Drug use: No     PHYSICAL EXAM     INITIAL VITALS: BP (!) 150/63   Pulse 62   Temp 97.9 °F (36.6 °C) (Oral)   Resp 16   Ht 5' 5\" (1.651 m)   Wt 145 lb (65.8 kg)   SpO2 98%   BMI 24.13 kg/m²    Physical Exam  Constitutional:       General: He is not in acute distress. Appearance: Normal appearance. He is well-developed. He is not diaphoretic. HENT:      Head: Normocephalic and atraumatic. Right Ear: External ear normal.      Left Ear: External ear normal.      Nose: Nose normal. No congestion. Mouth/Throat:      Mouth: Mucous membranes are moist.      Pharynx: Oropharynx is clear. Eyes:      General:         Right eye: No discharge. Left eye: No discharge. Conjunctiva/sclera: Conjunctivae normal.      Pupils: Pupils are equal, round, and reactive to light. Neck:      Musculoskeletal: Normal range of motion and neck supple. Trachea: No tracheal deviation. Cardiovascular:      Rate and Rhythm: Normal rate and regular rhythm. Pulses: Normal pulses. Heart sounds: Normal heart sounds. Comments: Radial pulse 2+ right hand  Cap refill less than 2 sec right hand fingers  Pulmonary:      Effort: Pulmonary effort is normal. No respiratory distress. Breath sounds: Normal breath sounds. No stridor. No wheezing or rales. Abdominal:      Palpations: Abdomen is soft. Tenderness: There is no abdominal tenderness. There is no guarding or rebound. Musculoskeletal: Normal range of motion. General: No deformity. Comments: Left dorsal thenar edema and ecchymosis with a small abrasion   Skin:     General: Skin is warm and dry. Capillary Refill: Capillary refill takes less than 2 seconds. Findings: No erythema or rash. Neurological:      General: No focal deficit present. Mental Status: He is alert and oriented to person, place, and time. Cranial Nerves: No cranial nerve deficit. Coordination: Coordination normal.      Comments: Left hand neuro intact   Psychiatric:         Mood and Affect: Mood normal.         Behavior: Behavior normal.         Thought Content: Thought content normal.         Judgment: Judgment normal.         MEDICAL DECISION MAKING:            Procedures    DIAGNOSTIC RESULTS     RADIOLOGY:All plain film, CT, MRI, and formal ultrasound images (except ED bedside ultrasound) are read by the radiologist, see reports below, unless otherwisenoted in MDM or here. XR HAND LEFT (MIN 3 VIEWS)   Final Result   No acute bony abnormality. Mild radiopaque debris along the skin surface of the radial sided index   finger and distal dorsal long and ring fingers.   Correlate with physical exam.               Do not see a fx  Reviewed images  He has glue on outside of hand, do not suspect intradermal fb    Discussed with patient anticipatory guidance, discharge instructions, follow up ortho Dr Jason Barragan 24 hours    EMERGENCY DEPARTMENTCOURSE:         Vitals:    Vitals:    03/26/21 1043   BP: (!) 150/63   Pulse: 62   Resp: 16   Temp: 97.9 °F (36.6 °C)   TempSrc: Oral   SpO2: 98%   Weight: 145 lb (65.8 kg)   Height: 5' 5\" (1.651 m)       The patient was given the following medications while in the emergency department:  Orders Placed This Encounter   Medications    acetaminophen (TYLENOL) tablet 1,000 mg    acetaminophen (TYLENOL) 500 MG tablet     Sig: Take 2 tablets by mouth every 6 hours as needed for Pain     Dispense:  60 tablet     Refill:  0       FINAL IMPRESSION      1. Contusion of left hand, initial encounter    2. Arthralgia of multiple joints          DISPOSITION/PLAN   DISPOSITION Decision To Discharge 03/26/2021 11:28:55 AM      PATIENT REFERRED TO:  Maricruz Casanova MD  [place] [address], [address] [address]  [phone]    Schedule an appointment as soon as possible for a visit in 1 day      DISCHARGE MEDICATIONS:  Discharge Medication List as of 3/26/2021 11:57 AM      START taking these medications    Details   acetaminophen (TYLENOL) 500 MG tablet Take 2 tablets by mouth every 6 hours as needed for Pain, Disp-60 tablet, R-0Print           Liza Ch MD  Attending Emergency Physician                    Liza Ch MD  03/26/21 6248      I do not think the patient has compartment syndrome at this time.   No Pain out of proportion   No Persistent deep ache or burning pain   No Paresthesias  No Pain with passive stretch of muscles in the affected compartment   No Tense compartments  No Pallor from vascular insufficiency   No Diminished sensation  No Muscle weakness   No Paralysis          Liza Ch MD  03/26/21 6210
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
## 2022-01-12 ENCOUNTER — HOSPITAL ENCOUNTER (OUTPATIENT)
Age: 62
Discharge: HOME | End: 2022-01-12
Payer: MEDICARE

## 2022-01-12 LAB
CHOLESTEROL, FASTING: 129 MG/DL
CHOLESTEROL/HDL RATIO: 3.8
HDLC SERPL-MCNC: 34 MG/DL
LDL CHOLESTEROL: 74 MG/DL (ref 0–130)
TRIGLYCERIDE, FASTING: 106 MG/DL
VLDLC SERPL CALC-MCNC: ABNORMAL MG/DL (ref 1–30)

## 2022-01-12 PROCEDURE — 36415 COLL VENOUS BLD VENIPUNCTURE: CPT

## 2022-01-12 PROCEDURE — 80061 LIPID PANEL: CPT

## 2022-02-02 ENCOUNTER — VIRTUAL VISIT (OUTPATIENT)
Dept: PRIMARY CARE CLINIC | Age: 62
End: 2022-02-02
Payer: MEDICARE

## 2022-02-02 DIAGNOSIS — R63.4 WEIGHT LOSS: Primary | ICD-10-CM

## 2022-02-02 DIAGNOSIS — E66.3 OVERWEIGHT (BMI 25.0-29.9): ICD-10-CM

## 2022-02-02 DIAGNOSIS — R73.01 IMPAIRED FASTING GLUCOSE: ICD-10-CM

## 2022-02-02 PROCEDURE — 3017F COLORECTAL CA SCREEN DOC REV: CPT | Performed by: STUDENT IN AN ORGANIZED HEALTH CARE EDUCATION/TRAINING PROGRAM

## 2022-02-02 PROCEDURE — G8420 CALC BMI NORM PARAMETERS: HCPCS | Performed by: STUDENT IN AN ORGANIZED HEALTH CARE EDUCATION/TRAINING PROGRAM

## 2022-02-02 PROCEDURE — G8428 CUR MEDS NOT DOCUMENT: HCPCS | Performed by: STUDENT IN AN ORGANIZED HEALTH CARE EDUCATION/TRAINING PROGRAM

## 2022-02-02 PROCEDURE — G8484 FLU IMMUNIZE NO ADMIN: HCPCS | Performed by: STUDENT IN AN ORGANIZED HEALTH CARE EDUCATION/TRAINING PROGRAM

## 2022-02-02 PROCEDURE — 99214 OFFICE O/P EST MOD 30 MIN: CPT | Performed by: STUDENT IN AN ORGANIZED HEALTH CARE EDUCATION/TRAINING PROGRAM

## 2022-02-02 PROCEDURE — 1036F TOBACCO NON-USER: CPT | Performed by: STUDENT IN AN ORGANIZED HEALTH CARE EDUCATION/TRAINING PROGRAM

## 2022-02-02 NOTE — PROGRESS NOTES
Mely Carrasco (:  1960) is a 59 y.o. male,, here for evaluation of the following chief complaint(s):  No chief complaint on file. ASSESSMENT/PLAN:  1. Weight loss  -     CT ABDOMEN PELVIS W IV CONTRAST Additional Contrast? Oral; Future  2. Overweight (BMI 25.0-29.9)  3. Impaired fasting glucose    12 to 13 pound weight loss over past 2 months  Otherwise no symptoms no B symptoms  Blood sugars previously normal however no hemoglobin A1c should probably check for this  Previous smoking history previous opioid use disorder  Would benefit from CT abdomen pelvis to rule out occult malignancy  CTA chest reviewed previously which was unremarkable  He had a colonoscopy in 2019 this was normal  Ejection fraction is preserved however he does have multivessel CAD has had 10 stents and bypass in the past  Most recent ejection fraction based on ventriculogram during cardiac catheterization last year showed preserved ejection fraction    No follow-ups on file. Subjective   SUBJECTIVE/OBJECTIVE:  HPI: 49-year-old male presents to discuss weight loss 13 pounds over past 2 months  Originally talked to his cardiologist about it who referred him to me  He was previously on chronic opioids had a dependence  Now he is off  Has not been tested for diabetes  Has 1 shot of [employer] needs to get a booster advised him to get either of the 3 FDA approved vaccinations    Review of Systems  Pertinent positives and negatives included in HPI 14 point ROS otherwise negative         Objective              An electronic signature was used to authenticate this note.     --Bessie Celeste MD

## 2022-02-15 ENCOUNTER — HOSPITAL ENCOUNTER (OUTPATIENT)
Age: 62
Discharge: HOME | End: 2022-02-15
Payer: MEDICARE

## 2022-02-15 DIAGNOSIS — R73.01 IMPAIRED FASTING GLUCOSE: ICD-10-CM

## 2022-02-15 LAB
ESTIMATED AVERAGE GLUCOSE: 111 MG/DL
HBA1C MFR BLD: 5.5 % (ref 4–6)
HCT VFR BLD CALC: 41.3 % (ref 41–53)
HEMOGLOBIN: 13.6 G/DL (ref 13.5–17.5)
MCH RBC QN AUTO: 30.4 PG (ref 26–34)
MCHC RBC AUTO-ENTMCNC: 33 G/DL (ref 31–37)
MCV RBC AUTO: 92.1 FL (ref 80–100)
NRBC AUTOMATED: ABNORMAL PER 100 WBC
PDW BLD-RTO: 13.5 % (ref 11.5–14.9)
PLATELET # BLD: 256 K/UL (ref 150–450)
PMV BLD AUTO: 8.2 FL (ref 6–12)
RBC # BLD: 4.49 M/UL (ref 4.5–5.9)
WBC # BLD: 8.3 K/UL (ref 3.5–11)

## 2022-02-15 PROCEDURE — 83036 HEMOGLOBIN GLYCOSYLATED A1C: CPT

## 2022-02-15 PROCEDURE — 36415 COLL VENOUS BLD VENIPUNCTURE: CPT

## 2022-02-15 PROCEDURE — 85027 COMPLETE CBC AUTOMATED: CPT

## 2022-03-26 ENCOUNTER — APPOINTMENT (OUTPATIENT)
Dept: GENERAL RADIOLOGY | Age: 62
End: 2022-03-26
Payer: MEDICARE

## 2022-03-26 ENCOUNTER — APPOINTMENT (OUTPATIENT)
Dept: CT IMAGING | Age: 62
End: 2022-03-26
Payer: MEDICARE

## 2022-03-26 ENCOUNTER — HOSPITAL ENCOUNTER (EMERGENCY)
Age: 62
Discharge: HOME | End: 2022-03-26
Attending: EMERGENCY MEDICINE
Payer: MEDICARE

## 2022-03-26 VITALS
WEIGHT: 137 LBS | SYSTOLIC BLOOD PRESSURE: 148 MMHG | BODY MASS INDEX: 22.82 KG/M2 | HEART RATE: 53 BPM | HEIGHT: 65 IN | TEMPERATURE: 98.3 F | DIASTOLIC BLOOD PRESSURE: 72 MMHG | RESPIRATION RATE: 18 BRPM | OXYGEN SATURATION: 100 %

## 2022-03-26 DIAGNOSIS — J04.0 LARYNGITIS: Primary | ICD-10-CM

## 2022-03-26 DIAGNOSIS — J40 BRONCHITIS: ICD-10-CM

## 2022-03-26 LAB
ABSOLUTE EOS #: 0.1 K/UL (ref 0–0.4)
ABSOLUTE LYMPH #: 3.1 K/UL (ref 1–4.8)
ABSOLUTE MONO #: 0.9 K/UL (ref 0.1–1.3)
ANION GAP SERPL CALCULATED.3IONS-SCNC: 8 MMOL/L (ref 9–17)
BASOPHILS # BLD: 1 % (ref 0–2)
BASOPHILS ABSOLUTE: 0.1 K/UL (ref 0–0.2)
BUN BLDV-MCNC: 13 MG/DL (ref 8–23)
CALCIUM SERPL-MCNC: 9.3 MG/DL (ref 8.6–10.4)
CHLORIDE BLD-SCNC: 103 MMOL/L (ref 98–107)
CO2: 28 MMOL/L (ref 20–31)
CREAT SERPL-MCNC: 1 MG/DL (ref 0.7–1.2)
EOSINOPHILS RELATIVE PERCENT: 2 % (ref 0–4)
GFR AFRICAN AMERICAN: >60 ML/MIN
GFR NON-AFRICAN AMERICAN: >60 ML/MIN
GFR SERPL CREATININE-BSD FRML MDRD: ABNORMAL ML/MIN/{1.73_M2}
GLUCOSE BLD-MCNC: 93 MG/DL (ref 70–99)
HCT VFR BLD CALC: 43.6 % (ref 41–53)
HEMOGLOBIN: 14.5 G/DL (ref 13.5–17.5)
LYMPHOCYTES # BLD: 33 % (ref 24–44)
MCH RBC QN AUTO: 30.3 PG (ref 26–34)
MCHC RBC AUTO-ENTMCNC: 33.2 G/DL (ref 31–37)
MCV RBC AUTO: 91.4 FL (ref 80–100)
MONOCYTES # BLD: 10 % (ref 1–7)
PDW BLD-RTO: 13.6 % (ref 11.5–14.9)
PLATELET # BLD: 242 K/UL (ref 150–450)
PMV BLD AUTO: 7.6 FL (ref 6–12)
POTASSIUM SERPL-SCNC: 4.6 MMOL/L (ref 3.7–5.3)
RBC # BLD: 4.78 M/UL (ref 4.5–5.9)
SEG NEUTROPHILS: 54 % (ref 36–66)
SEGMENTED NEUTROPHILS ABSOLUTE COUNT: 5.1 K/UL (ref 1.3–9.1)
SODIUM BLD-SCNC: 139 MMOL/L (ref 135–144)
WBC # BLD: 9.3 K/UL (ref 3.5–11)

## 2022-03-26 PROCEDURE — 36415 COLL VENOUS BLD VENIPUNCTURE: CPT

## 2022-03-26 PROCEDURE — 2580000003 HC RX 258: Performed by: EMERGENCY MEDICINE

## 2022-03-26 PROCEDURE — 71045 X-RAY EXAM CHEST 1 VIEW: CPT

## 2022-03-26 PROCEDURE — 99284 EMERGENCY DEPT VISIT MOD MDM: CPT

## 2022-03-26 PROCEDURE — 80048 BASIC METABOLIC PNL TOTAL CA: CPT

## 2022-03-26 PROCEDURE — 6360000004 HC RX CONTRAST MEDICATION: Performed by: EMERGENCY MEDICINE

## 2022-03-26 PROCEDURE — 6370000000 HC RX 637 (ALT 250 FOR IP): Performed by: EMERGENCY MEDICINE

## 2022-03-26 PROCEDURE — 85025 COMPLETE CBC W/AUTO DIFF WBC: CPT

## 2022-03-26 PROCEDURE — 70491 CT SOFT TISSUE NECK W/DYE: CPT

## 2022-03-26 RX ORDER — 0.9 % SODIUM CHLORIDE 0.9 %
80 INTRAVENOUS SOLUTION INTRAVENOUS ONCE
Status: COMPLETED | OUTPATIENT
Start: 2022-03-26 | End: 2022-03-26

## 2022-03-26 RX ORDER — SODIUM CHLORIDE 0.9 % (FLUSH) 0.9 %
10 SYRINGE (ML) INJECTION PRN
Status: DISCONTINUED | OUTPATIENT
Start: 2022-03-26 | End: 2022-03-26 | Stop reason: HOSPADM

## 2022-03-26 RX ADMIN — BENZOCAINE 1 LOZENGE: 2.6; 15 LOZENGE ORAL at 14:13

## 2022-03-26 RX ADMIN — IOPAMIDOL 75 ML: 755 INJECTION, SOLUTION INTRAVENOUS at 14:52

## 2022-03-26 RX ADMIN — SODIUM CHLORIDE 80 ML: 9 INJECTION, SOLUTION INTRAVENOUS at 14:50

## 2022-03-26 RX ADMIN — SODIUM CHLORIDE, PRESERVATIVE FREE 10 ML: 5 INJECTION INTRAVENOUS at 14:54

## 2022-03-26 ASSESSMENT — ENCOUNTER SYMPTOMS
VOICE CHANGE: 1
TROUBLE SWALLOWING: 0
SHORTNESS OF BREATH: 0
COUGH: 1
SORE THROAT: 1
DIARRHEA: 0
VOMITING: 0

## 2022-03-26 ASSESSMENT — PAIN DESCRIPTION - LOCATION: LOCATION: THROAT

## 2022-03-26 ASSESSMENT — PAIN - FUNCTIONAL ASSESSMENT: PAIN_FUNCTIONAL_ASSESSMENT: 0-10

## 2022-03-26 ASSESSMENT — PAIN SCALES - GENERAL: PAINLEVEL_OUTOF10: 3

## 2022-03-26 ASSESSMENT — PAIN DESCRIPTION - PAIN TYPE: TYPE: ACUTE PAIN

## 2022-03-26 NOTE — ED PROVIDER NOTES
[address] [facility]  EMERGENCY DEPARTMENT ENCOUNTER      Pt Name: Zuleima Love  MRN: [id number]  [place] 1960  Date of evaluation: 3/26/22      CHIEF COMPLAINT       Chief Complaint   Patient presents with    Pharyngitis    Cough    Chest Pain         HISTORY OF PRESENT ILLNESS   HPI 59 y.o. male presents with c/o throat pain for the last 3 weeks. He has had a hard time speaking. He is also having a bad cough, non-productive in character. He reports pain in his sternal area after coughing. He has tried taking over the counter cold and flu medication with minimal relief. He reports he feels like his throat to close up at night. He reports that he is scheduled to see an ENT doctor at an outside hospital in the coming weeks  . REVIEW OF SYSTEMS       Review of Systems   Constitutional: Positive for unexpected weight change (12lb in the last month). Negative for fatigue and fever. HENT: Positive for sore throat and voice change. Negative for trouble swallowing. Respiratory: Positive for cough. Negative for shortness of breath. Cardiovascular: Positive for chest pain (after coughing). Gastrointestinal: Negative for diarrhea and vomiting. Genitourinary: Negative for difficulty urinating. Musculoskeletal: Positive for myalgias. Skin: Negative for rash. Neurological: Negative for headaches.        PAST MEDICAL HISTORY     Past Medical History:   Diagnosis Date    Anxiety     Atherosclerosis of autologous vein coronary artery bypass graft 3/29/2007    CAD (coronary artery disease)     Chronic back pain     Congenital heart disease     Depression     Headache(784.0)     Herpes zoster keratoconjunctivitis 2/16/2017    History of colon polyps 10/15/2019    tubular adenoma x1    Hx of heart artery stent     pt states he has heart stents x 10    Hyperlipidemia 5/30/2014    Hypertension     LONG TERM ANTICOAGULENT USE     [facility]) [facility] 2007    Opioid withdrawal ([facility] 2018    Osteoarthritis     Radicular syndrome of left leg 11/15/2016    Restless legs syndrome     S/P CABG x 4     Shingles outbreak 02/10/2017    in left eye        SURGICAL HISTORY       Past Surgical History:   Procedure Laterality Date    APPENDECTOMY      CARDIAC CATHETERIZATION      CARDIAC CATHETERIZATION      pt states he has heart stents x 10    COLONOSCOPY N/A 10/15/2019    COLONOSCOPY POLYPECTOMY SNARE/COLD performed by Amy Kaur MD at [facility].  ,     CABG x 4    UPPER GASTROINTESTINAL ENDOSCOPY         CURRENT MEDICATIONS       Discharge Medication List as of 3/26/2022  3:27 PM      CONTINUE these medications which have NOT CHANGED    Details   acetaminophen (TYLENOL) 500 MG tablet Take 2 tablets by mouth every 6 hours as needed for Pain, Disp-60 tablet, R-0Print      atorvastatin (LIPITOR) 40 MG tablet Take 1 tablet by mouth daily, Disp-90 tablet, R-0Normal      clopidogrel (PLAVIX) 75 MG tablet Take 1 tablet by mouth daily, Disp-90 tablet, R-1Normal      diclofenac sodium (VOLTAREN) 1 % GEL Apply 2 g topically 4 times daily, Topical, 4 TIMES DAILY Starting Wed 10/7/2020, Disp-1 Tube,R-0, Normal      nitroGLYCERIN (NITROSTAT) 0.4 MG SL tablet Place 0.4 mg under the tongueHistorical Med      metoprolol succinate (TOPROL XL) 25 MG extended release tablet Take 25 mg by mouth dailyHistorical Med      aspirin (ASPIRIN CHILDRENS) 81 MG chewable tablet Take 1 tablet by mouth daily, Disp-30 tablet, R-3Normal             ALLERGIES     has No Known Allergies. FAMILY HISTORY     He indicated that his mother is alive. He indicated that his father is . SOCIAL HISTORY      reports that he quit smoking about 22 years ago. His smoking use included cigarettes. He has a 20.00 pack-year smoking history. He has never used smokeless tobacco. He reports that he does not drink alcohol and does not use drugs.     PHYSICAL EXAM INITIAL VITALS: BP (!) 148/72   Pulse 53   Temp 98.3 °F (36.8 °C) (Oral)   Resp 18   Ht 5' 5\" (1.651 m)   Wt 137 lb (62.1 kg)   SpO2 100%   BMI 22.80 kg/m²   Gen: NAD  Head: Normocephalic, atraumatic  Eye: Pupils equal round reactive to light, no conjunctivitis  ENT: MMM, no tonsillar or uvular edema. No glottic edema. Neck: No stridor, I feel possible mass on the left side of the neck that seems mildly tender to the patient. Heart: Bradycardic with a regular rhythm no murmurs  Lungs: Clear to auscultation bilaterally, no respiratory distress  Chest wall: No crepitus, no tenderness palpation  Abdomen: Soft, nontender, nondistended, with no peritoneal signs  Neurologic: Patient is alert and oriented x3,    Extremities: no edema    MEDICAL DECISION MAKING:     MDM  59 y.o. male voice change, tenderness to the left side of his neck. Difficulty breathing weight loss. Obtaining a CT scan of the neck to evaluate for any infection or mass lesion. Obtaining a chest x-ray to make sure he does not have any pneumonia or sign of lung cancer. Emergency Department course:  Chest x-ray, neck CT scan, laboratory studies are unremarkable. Patient has no evidence of upper airway obstruction. D/w pt the results, treatment plan, warning precautions for prompt ED return and importance of close OP FU, he verbalizes understanding and agrees with the treatment plan. DIAGNOSTIC RESULTS     RADIOLOGY:All plain film, CT, MRI, and formal ultrasound images (except ED bedside ultrasound) are read by the radiologist and the images and interpretations are directly viewed by the emergency physician. CT SOFT TISSUE NECK W CONTRAST   Final Result   No acute abnormality of the soft tissue structures of the neck. XR CHEST PORTABLE   Final Result      No acute findings in the chest.             LABS: All lab results were reviewed by myself, and all abnormals are listed below.   Labs Reviewed   CBC WITH AUTO DIFFERENTIAL - Abnormal; Notable for the following components:       Result Value    Monocytes 10 (*)     All other components within normal limits   BASIC METABOLIC PANEL - Abnormal; Notable for the following components:    Anion Gap 8 (*)     All other components within normal limits       EMERGENCY DEPARTMENT COURSE:   Vitals:    Vitals:    03/26/22 1308   BP: (!) 148/72   Pulse: 53   Resp: 18   Temp: 98.3 °F (36.8 °C)   TempSrc: Oral   SpO2: 100%   Weight: 137 lb (62.1 kg)   Height: 5' 5\" (1.651 m)       The patient was given the following medications while in the emergency department:  Orders Placed This Encounter   Medications    DISCONTD: Benzocaine-Menthol (CEPACOL SORE THROAT) 15-2.6 MG lozenge 1 lozenge    0.9 % sodium chloride bolus    DISCONTD: sodium chloride flush 0.9 % injection 10 mL    iopamidol (ISOVUE-370) 76 % injection 75 mL    Benzocaine-Menthol (CEPACOL) 6-10 MG LOZG lozenge     Sig: Take 1 lozenge by mouth every 2 hours as needed for Sore Throat     Dispense:  18 lozenge     Refill:  0     -------------------------  CRITICAL CARE:   CONSULTS: None  PROCEDURES: Procedures     FINAL IMPRESSION      1. Laryngitis    2.  Bronchitis          DISPOSITION/PLAN   DISPOSITION Decision To Discharge 03/26/2022 03:26:52 PM      PATIENT REFERRED TO:  Toribio Garcia MD  [address]  [phone]    In 3 days      [facility] ED  [facility] [address] [place] [address] [address] [address] [phone]    If symptoms worsen      DISCHARGE MEDICATIONS:  Discharge Medication List as of 3/26/2022  3:27 PM      START taking these medications    Details   Benzocaine-Menthol (CEPACOL) 6-10 MG LOZG lozenge Take 1 lozenge by mouth every 2 hours as needed for Sore Throat, Disp-18 lozenge, R-0Print               Leopoldo Ken, MD  Attending Emergency Physician                      Leopoldo Ken, MD  03/27/22 1181

## 2022-03-26 NOTE — ED TRIAGE NOTES
Mode of arrival (squad #, walk in, police, etc) : walk in        Chief complaint(s): throat tightness, hoarse voice        Arrival Note (brief scenario, treatment PTA, etc). : onset 2 weeks and worsening, feels like this throat was going to close last night. C= \"Have you ever felt that you should Cut down on your drinking? \" no  A= \"Have people Annoyed you by criticizing your drinking? \" no  G= \"Have you ever felt bad or Guilty about your drinking? \"  no  E= \"Have you ever had a drink as an Eye-opener first thing in the morning to steady your nerves or to help a hangover? \"  no      Deferred []      Reason for deferring: na    *If yes to two or more: probable alcohol abuse. *

## 2022-06-27 ENCOUNTER — HOSPITAL ENCOUNTER (EMERGENCY)
Age: 62
Discharge: HOME | End: 2022-06-27
Attending: STUDENT IN AN ORGANIZED HEALTH CARE EDUCATION/TRAINING PROGRAM
Payer: MEDICARE

## 2022-06-27 ENCOUNTER — APPOINTMENT (OUTPATIENT)
Dept: GENERAL RADIOLOGY | Age: 62
End: 2022-06-27
Payer: MEDICARE

## 2022-06-27 VITALS
SYSTOLIC BLOOD PRESSURE: 139 MMHG | OXYGEN SATURATION: 98 % | TEMPERATURE: 97.9 F | RESPIRATION RATE: 20 BRPM | DIASTOLIC BLOOD PRESSURE: 72 MMHG | HEART RATE: 55 BPM

## 2022-06-27 DIAGNOSIS — S93.105A DISLOCATION OF PHALANX OF LEFT FOOT, INITIAL ENCOUNTER: Primary | ICD-10-CM

## 2022-06-27 PROCEDURE — 28630 TREAT TOE DISLOCATION: CPT

## 2022-06-27 PROCEDURE — 6360000002 HC RX W HCPCS: Performed by: STUDENT IN AN ORGANIZED HEALTH CARE EDUCATION/TRAINING PROGRAM

## 2022-06-27 PROCEDURE — 2500000003 HC RX 250 WO HCPCS: Performed by: STUDENT IN AN ORGANIZED HEALTH CARE EDUCATION/TRAINING PROGRAM

## 2022-06-27 PROCEDURE — 90715 TDAP VACCINE 7 YRS/> IM: CPT | Performed by: STUDENT IN AN ORGANIZED HEALTH CARE EDUCATION/TRAINING PROGRAM

## 2022-06-27 PROCEDURE — 99284 EMERGENCY DEPT VISIT MOD MDM: CPT

## 2022-06-27 PROCEDURE — 73630 X-RAY EXAM OF FOOT: CPT

## 2022-06-27 PROCEDURE — 90471 IMMUNIZATION ADMIN: CPT | Performed by: STUDENT IN AN ORGANIZED HEALTH CARE EDUCATION/TRAINING PROGRAM

## 2022-06-27 PROCEDURE — 6370000000 HC RX 637 (ALT 250 FOR IP): Performed by: STUDENT IN AN ORGANIZED HEALTH CARE EDUCATION/TRAINING PROGRAM

## 2022-06-27 RX ORDER — LIDOCAINE HYDROCHLORIDE 10 MG/ML
10 INJECTION, SOLUTION INFILTRATION; PERINEURAL ONCE
Status: COMPLETED | OUTPATIENT
Start: 2022-06-27 | End: 2022-06-27

## 2022-06-27 RX ORDER — HYDROCODONE BITARTRATE AND ACETAMINOPHEN 5; 325 MG/1; MG/1
1 TABLET ORAL ONCE
Status: COMPLETED | OUTPATIENT
Start: 2022-06-27 | End: 2022-06-27

## 2022-06-27 RX ADMIN — LIDOCAINE HYDROCHLORIDE 10 ML: 10 INJECTION, SOLUTION INFILTRATION; PERINEURAL at 04:14

## 2022-06-27 RX ADMIN — TETANUS TOXOID, REDUCED DIPHTHERIA TOXOID AND ACELLULAR PERTUSSIS VACCINE, ADSORBED 0.5 ML: 5; 2.5; 8; 8; 2.5 SUSPENSION INTRAMUSCULAR at 05:00

## 2022-06-27 RX ADMIN — HYDROCODONE BITARTRATE AND ACETAMINOPHEN 1 TABLET: 5; 325 TABLET ORAL at 04:12

## 2022-06-27 ASSESSMENT — ENCOUNTER SYMPTOMS
SORE THROAT: 0
ABDOMINAL PAIN: 0
RHINORRHEA: 0
EYE REDNESS: 0
VOMITING: 0
EYE PAIN: 0
COLOR CHANGE: 0
FACIAL SWELLING: 0
BACK PAIN: 0
COUGH: 0
SHORTNESS OF BREATH: 0
DIARRHEA: 0
NAUSEA: 0

## 2022-06-27 NOTE — ED PROVIDER NOTES
([facility] 1/23/2018    Osteoarthritis     Radicular syndrome of left leg 11/15/2016    Restless legs syndrome     S/P CABG x 4     Shingles outbreak 02/10/2017    in left eye      Past Problem List  Patient Active Problem List   Diagnosis Code    CAD (coronary artery disease) s/p CABGx4, 10 stents I25.10    Anxiety F41.9    Chest pain with high risk of acute coronary syndrome R07.9    Unstable gait R26.81    DJD (degenerative joint disease), lumbosacral M47.817    Chest wall pain, chronic R07.89, G89.29    Psychophysiological insomnia F51.04    Frequency of urination R35.0    Urinary hesitancy R39.11    Nocturia more than twice per night R35.1    Mixed hyperlipidemia E78.2    Hypertension I10    Radicular syndrome of left leg M54.10    Primary osteoarthritis of both knees M17.0    Ganglionitis, gasserian G50.0    Ocular hypertension H40.059    Unstable angina (HCC) I20.0    Reactive depression F32.9    Atherosclerosis of coronary artery bypass graft(s) without angina pectoris I25.810    Bilateral hearing loss H91.93    Polyp of descending colon K63.5    History of colon polyps Z86.010    Chest pain R07.9     SURGICAL HISTORY       Past Surgical History:   Procedure Laterality Date    APPENDECTOMY      CARDIAC CATHETERIZATION      CARDIAC CATHETERIZATION      pt states he has heart stents x 10    COLONOSCOPY N/A 10/15/2019    COLONOSCOPY POLYPECTOMY SNARE/COLD performed by Ish Garcia MD at [facility] [address]  2008, 2009    CABG x 4    UPPER GASTROINTESTINAL ENDOSCOPY       CURRENT MEDICATIONS       Discharge Medication List as of 6/27/2022  4:52 AM      CONTINUE these medications which have NOT CHANGED    Details   Benzocaine-Menthol (CEPACOL) 6-10 MG LOZG lozenge Take 1 lozenge by mouth every 2 hours as needed for Sore Throat, Disp-18 lozenge, R-0Print      acetaminophen (TYLENOL) 500 MG tablet Take 2 tablets by mouth every 6 hours as needed for Pain, Disp-60 tablet, R-0Print      atorvastatin (LIPITOR) 40 MG tablet Take 1 tablet by mouth daily, Disp-90 tablet, R-0Normal      clopidogrel (PLAVIX) 75 MG tablet Take 1 tablet by mouth daily, Disp-90 tablet, R-1Normal      diclofenac sodium (VOLTAREN) 1 % GEL Apply 2 g topically 4 times daily, Topical, 4 TIMES DAILY Starting Wed 10/7/2020, Disp-1 Tube,R-0, Normal      nitroGLYCERIN (NITROSTAT) 0.4 MG SL tablet Place 0.4 mg under the tongueHistorical Med      metoprolol succinate (TOPROL XL) 25 MG extended release tablet Take 25 mg by mouth dailyHistorical Med      aspirin (ASPIRIN CHILDRENS) 81 MG chewable tablet Take 1 tablet by mouth daily, Disp-30 tablet, R-3Normal           ALLERGIES     has No Known Allergies. FAMILY HISTORY     He indicated that his mother is alive. He indicated that his father is . SOCIAL HISTORY       Social History     Tobacco Use    Smoking status: Former Smoker     Packs/day: 1.00     Years: 20.00     Pack years: 20.00     Types: Cigarettes     Quit date: 10/24/1999     Years since quittin.6    Smokeless tobacco: Never Used   Vaping Use    Vaping Use: Never used   Substance Use Topics    Alcohol use: No    Drug use: No     PHYSICAL EXAM     INITIAL VITALS: /72   Pulse 55   Temp 97.9 °F (36.6 °C) (Oral)   Resp 20   SpO2 98%    Physical Exam  Vitals and nursing note reviewed. Constitutional:       Appearance: Normal appearance. HENT:      Head: Normocephalic and atraumatic. Nose: Nose normal.   Eyes:      Extraocular Movements: Extraocular movements intact. Pupils: Pupils are equal, round, and reactive to light. Cardiovascular:      Rate and Rhythm: Normal rate and regular rhythm. Pulmonary:      Effort: Pulmonary effort is normal. No respiratory distress. Breath sounds: Normal breath sounds. Abdominal:      General: Abdomen is flat. There is no distension. Palpations: Abdomen is soft. There is no mass.    Musculoskeletal: General: No swelling. Normal range of motion. Cervical back: Normal range of motion. No rigidity. Comments: Tenderness and deformity of the left third toe   Skin:     General: Skin is warm and dry. Neurological:      General: No focal deficit present. Mental Status: He is alert. Mental status is at baseline. Cranial Nerves: No cranial nerve deficit. MEDICAL DECISION MAKIN-year-old male presents for evaluation with left third toe pain. X-rays are consistent with a dislocation of the left third toe at the PIP joint. Reduction performed at the bedside successfully under a digital block. Toes graham taped and placed in a postop shoe and will have the patient follow-up with podiatry. Postreduction film showed successful reduction, small bony fragment along medial margin of third toe         CRITICAL CARE:       PROCEDURES:    Ortho Injury    Date/Time: 2022 5:14 AM  Performed by: Jose David Freeman MD  Authorized by: Jose David Freeman MD   Consent: Verbal consent obtained.   Risks and benefits: risks, benefits and alternatives were discussed  Consent given by: patient  Patient understanding: patient states understanding of the procedure being performed  Patient consent: the patient's understanding of the procedure matches consent given  Injury location: toe  Location details: left third toe  Injury type: dislocation  Dislocation type: PIP  Pre-procedure neurovascular assessment: neurovascularly intact  Pre-procedure distal perfusion: normal  Pre-procedure neurological function: normal  Pre-procedure range of motion: reduced  Anesthesia: digital block    Anesthesia:  Local anesthesia used: yes  Local Anesthetic: lidocaine 1% without epinephrine  Anesthetic total: 8 mL  Manipulation performed: yes  Reduction successful: yes  X-ray confirmed reduction: yes  Immobilization: tape (graham tape and post-op shoe )  Post-procedure neurovascular assessment: post-procedure neurovascularly intact  Post-procedure distal perfusion: normal  Post-procedure neurological function: normal  Post-procedure range of motion: normal          DIAGNOSTIC RESULTS   EKG:All EKG's are interpreted by the Emergency Department Physician who either signs or Co-signs this chart in the absence of a cardiologist.        RADIOLOGY:All plain film, CT, MRI, and formal ultrasound images (except ED bedside ultrasound) are read by the radiologist, see reports below, unless otherwisenoted in MDM or here. XR FOOT LEFT (MIN 3 VIEWS)   Preliminary Result   1. Interval reduction of 3rd toe PIP dislocation. Alignment is now anatomic. 2. Small bony fragment along the medial margin of the 3rd toe proximal   phalanx with probable donor site along the medial base of the middle phalanx. XR FOOT LEFT (MIN 3 VIEWS)   Preliminary Result   Acute dorsal lateral dislocation of the 3rd digit at the PIP joint. No   obvious fracture identified. LABS: All lab results were reviewed by myself, and all abnormals are listed below. Labs Reviewed - No data to display    EMERGENCY DEPARTMENTCOURSE:         Vitals:    Vitals:    06/27/22 0423   BP: 139/72   Pulse: 55   Resp: 20   Temp: 97.9 °F (36.6 °C)   TempSrc: Oral   SpO2: 98%       The patient was given the following medications while in the emergency department:  Orders Placed This Encounter   Medications    HYDROcodone-acetaminophen (NORCO) 5-325 MG per tablet 1 tablet    lidocaine 1 % injection 10 mL    tetanus-diphth-acell pertussis (BOOSTRIX) injection 0.5 mL     CONSULTS:  None    FINAL IMPRESSION      1.  Dislocation of phalanx of left foot, initial encounter          DISPOSITION/PLAN   DISPOSITION Decision To Discharge 06/27/2022 04:51:37 AM      PATIENT REFERRED TO:  Teresa Carl DPM  [address] [place] [address] 97  55  JOHN [address] [phone]    Call in 1 day      DISCHARGE MEDICATIONS:  Discharge Medication List as of 6/27/2022  4:52 AM The care is provided during an unprecedented national emergency due to the novel coronavirus, COVID 19.   MD Yon Mcnamara MD  06/27/22 7651

## 2022-06-27 NOTE — ED NOTES
Toes graham taped per Dr. Indigo Clarke and post op shoe applied and pt instructed on use. 4th toes has minor scrapes and was cleaned with soap and water. Pt being escorted home with family members.       [address]  06/27/22 5450
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
## 2022-06-30 ENCOUNTER — OFFICE VISIT (OUTPATIENT)
Dept: PODIATRY | Age: 62
End: 2022-06-30
Payer: MEDICARE

## 2022-06-30 VITALS — HEIGHT: 65 IN | WEIGHT: 145 LBS | BODY MASS INDEX: 24.16 KG/M2

## 2022-06-30 DIAGNOSIS — S93.105D: ICD-10-CM

## 2022-06-30 DIAGNOSIS — S92.425A CLOSED NONDISPLACED FRACTURE OF DISTAL PHALANX OF LEFT GREAT TOE, INITIAL ENCOUNTER: ICD-10-CM

## 2022-06-30 DIAGNOSIS — M79.605 LOWER LIMB PAIN, INFERIOR, LEFT: Primary | ICD-10-CM

## 2022-06-30 DIAGNOSIS — R60.0 EDEMA OF LOWER EXTREMITY: ICD-10-CM

## 2022-06-30 PROCEDURE — 99214 OFFICE O/P EST MOD 30 MIN: CPT | Performed by: PODIATRIST

## 2022-06-30 PROCEDURE — G8420 CALC BMI NORM PARAMETERS: HCPCS | Performed by: PODIATRIST

## 2022-06-30 PROCEDURE — G8427 DOCREV CUR MEDS BY ELIG CLIN: HCPCS | Performed by: PODIATRIST

## 2022-06-30 PROCEDURE — 28510 TREATMENT OF TOE FRACTURE: CPT | Performed by: PODIATRIST

## 2022-06-30 PROCEDURE — 1036F TOBACCO NON-USER: CPT | Performed by: PODIATRIST

## 2022-06-30 PROCEDURE — 3017F COLORECTAL CA SCREEN DOC REV: CPT | Performed by: PODIATRIST

## 2022-06-30 RX ORDER — TRAMADOL HYDROCHLORIDE 50 MG/1
50 TABLET ORAL EVERY 6 HOURS PRN
Qty: 28 TABLET | Refills: 0 | Status: SHIPPED | OUTPATIENT
Start: 2022-06-30 | End: 2022-07-07

## 2022-07-07 NOTE — PROGRESS NOTES
[facility]  [address] [address]  Dept: [phone]    NEW PATIENT PROGRESS NOTE  Date of patient's visit: 7/7/2022  Patient's Name:  Pratik Arriola YOB: 1960            Patient Care Team:  Irina Kinney MD as PCP - General (Internal Medicine)  Irina Kinney MD as PCP - [facility]  Brigitte Bowman MD as Consulting Physician (Gastroenterology)  Laura Caceres MD as Consulting Physician (Cardiology)        Chief Complaint   Patient presents with   [address] [facility] Patient     ED Follow up          HPI:   Pratik Arriola is a 57 y.o. male who presents to the office today complaining of left foot pain along 3rd toe. Patient relates pain is Present. Pain is rated 4 out of 10 and is described as intermittent. Treatments prior to today's visit include: went to ED where he received xrays. states 3rd toe was dislocated and reduced in the ED and discharged with surgical shoe. Currently denies F/C/N/V.  Pt's primary care physician is Irina Kinney MD     No Known Allergies    Past Medical History:   Diagnosis Date    Anxiety     Atherosclerosis of autologous vein coronary artery bypass graft 3/29/2007    CAD (coronary artery disease)     Chronic back pain     Congenital heart disease     Depression     Headache(784.0)     Herpes zoster keratoconjunctivitis 2/16/2017    History of colon polyps 10/15/2019    tubular adenoma x1    Hx of heart artery stent     pt states he has heart stents x 10    Hyperlipidemia 5/30/2014    Hypertension     LONG TERM ANTICOAGULENT USE     MI (myocardial infarction) ([facility] [address] 2007    Opioid withdrawal ([facility] [address] [facility] 1/23/2018    Osteoarthritis     Radicular syndrome of left leg 11/15/2016    Restless legs syndrome     S/P CABG x 4     Shingles outbreak 02/10/2017    in left eye        Prior to Admission medications    Medication Sig Start Date End Date Taking? Authorizing Provider   metoprolol tartrate (LOPRESSOR) 25 MG tablet  22  Yes Historical Provider, MD   traMADol (ULTRAM) 50 MG tablet Take 1 tablet by mouth every 6 hours as needed for Pain for up to 7 days.  22 Yes Harmony Leyva DPM   Benzocaine-Menthol (CEPACOL) 6-10 MG LOZG lozenge Take 1 lozenge by mouth every 2 hours as needed for Sore Throat 3/26/22  Yes Beverly Sigala MD   acetaminophen (TYLENOL) 500 MG tablet Take 2 tablets by mouth every 6 hours as needed for Pain 3/26/21  Yes Alphonso Douglas MD   atorvastatin (LIPITOR) 40 MG tablet Take 1 tablet by mouth daily 21  Yes Mikhail Alfonso PA-C   clopidogrel (PLAVIX) 75 MG tablet Take 1 tablet by mouth daily 21  Yes Mikhail Alfonso PA-C   diclofenac sodium (VOLTAREN) 1 % GEL Apply 2 g topically 4 times daily 10/7/20  Yes ROSSANA Moya   nitroGLYCERIN (NITROSTAT) 0.4 MG SL tablet Place 0.4 mg under the tongue 3/25/19  Yes Historical Provider, MD   aspirin (ASPIRIN CHILDRENS) 81 MG chewable tablet Take 1 tablet by mouth daily 10/5/18  Yes Asha Salter DO   metoprolol succinate (TOPROL XL) 25 MG extended release tablet Take 25 mg by mouth daily 19  Historical Provider, MD       Past Surgical History:   Procedure Laterality Date    APPENDECTOMY     R [address]      pt states he has heart stents x 10    COLONOSCOPY N/A 10/15/2019    COLONOSCOPY POLYPECTOMY SNARE/COLD performed by Noe Vigil MD at [facility] [address]  ,     CABG x 4    UPPER GASTROINTESTINAL ENDOSCOPY         Family History   Problem Relation Age of Onset    Diabetes Mother     Hypertension Mother     Heart Disease Father     Cancer Father        Social History     Tobacco Use    Smoking status: Former Smoker     Packs/day: 1.00     Years: 20.00     Pack years: 20.00     Types: Cigarettes     Quit date: 10/24/1999     Years since quittin.7 absent, Bilateral.     Xrays, 3 views, left foot:    1. Interval reduction of 3rd toe PIP dislocation.  Alignment is now anatomic. 2. Small bony fragment along the medial margin of the 3rd toe proximal   phalanx with probable donor site along the medial base of the middle phalanx.           Asessment: Patient is a 57 y.o. male with:    Diagnosis Orders   1. Lower limb pain, inferior, left  traMADol (ULTRAM) 50 MG tablet    DE CLOSED RX TOE FX, EACH   2. Edema of lower extremity  DE CLOSED RX TOE FX, EACH   3. Toe dislocation, left, subsequent encounter  DE CLOSED RX TOE FX, EACH   4. Closed nondisplaced fracture of distal phalanx of left great toe, initial encounter  DE CLOSED RX TOE FX, EACH         Plan: Patient examined and evaluated. Current condition and treatment options discussed in detail. Discussed conservative and surgical options with the patient. Advised pt to graham splint 2nd and 3rd digits. Pt to wear post op shoe at all times when walking. No surgery is recommended at this time. All labs were reviewed and all imagining including the above findings were reviewed PRIOR to the patients arrival and with the patient today. Previous patient encounter was reviewed. Encounters from the patients other medical providers were reviewed and noted. Time was spent educating the patient and their families/caregivers on proper care of the feet and ankles. All the above diagnosis were addressed at todays visit and all questions were answered. A total of 45 minutes was spent with this patients encounter which included charting after the patients visit    . Verbal and written instructions given to patient. Contact office with any questions/problems/concerns. RTC in 3week(s).     6/30/2022    Electronically signed by Lexus Francisco DPM on 7/7/2022 at 3:24 PM  6/30/2022

## 2022-12-05 ENCOUNTER — OFFICE VISIT (OUTPATIENT)
Dept: PRIMARY CARE CLINIC | Age: 62
End: 2022-12-05
Payer: MEDICARE

## 2022-12-05 VITALS
DIASTOLIC BLOOD PRESSURE: 66 MMHG | BODY MASS INDEX: 24.06 KG/M2 | OXYGEN SATURATION: 98 % | WEIGHT: 144.6 LBS | SYSTOLIC BLOOD PRESSURE: 142 MMHG | HEART RATE: 45 BPM

## 2022-12-05 DIAGNOSIS — M25.511 ACUTE PAIN OF RIGHT SHOULDER: ICD-10-CM

## 2022-12-05 DIAGNOSIS — M25.561 CHRONIC PAIN OF BOTH KNEES: Primary | ICD-10-CM

## 2022-12-05 DIAGNOSIS — M25.562 CHRONIC PAIN OF BOTH KNEES: Primary | ICD-10-CM

## 2022-12-05 DIAGNOSIS — I25.10 CORONARY ARTERY DISEASE INVOLVING NATIVE CORONARY ARTERY OF NATIVE HEART WITHOUT ANGINA PECTORIS: ICD-10-CM

## 2022-12-05 DIAGNOSIS — I73.9 CLAUDICATION (HCC): ICD-10-CM

## 2022-12-05 DIAGNOSIS — I10 PRIMARY HYPERTENSION: ICD-10-CM

## 2022-12-05 DIAGNOSIS — G89.29 CHRONIC PAIN OF BOTH KNEES: Primary | ICD-10-CM

## 2022-12-05 DIAGNOSIS — J38.3 DISORDER OF VOCAL CORD: ICD-10-CM

## 2022-12-05 DIAGNOSIS — Z76.0 MEDICATION REFILL: ICD-10-CM

## 2022-12-05 PROCEDURE — 3078F DIAST BP <80 MM HG: CPT | Performed by: NURSE PRACTITIONER

## 2022-12-05 PROCEDURE — 99213 OFFICE O/P EST LOW 20 MIN: CPT | Performed by: NURSE PRACTITIONER

## 2022-12-05 PROCEDURE — 3074F SYST BP LT 130 MM HG: CPT | Performed by: NURSE PRACTITIONER

## 2022-12-05 RX ORDER — ATORVASTATIN CALCIUM 40 MG/1
40 TABLET, FILM COATED ORAL DAILY
Qty: 90 TABLET | Refills: 0 | Status: SHIPPED | OUTPATIENT
Start: 2022-12-05

## 2022-12-05 RX ORDER — ASPIRIN 81 MG/1
81 TABLET, CHEWABLE ORAL DAILY
Qty: 30 TABLET | Refills: 3 | Status: SHIPPED | OUTPATIENT
Start: 2022-12-05

## 2022-12-05 RX ORDER — NITROGLYCERIN 0.4 MG/1
0.4 TABLET SUBLINGUAL EVERY 5 MIN PRN
Qty: 25 TABLET | Refills: 1 | Status: SHIPPED | OUTPATIENT
Start: 2022-12-05

## 2022-12-05 RX ORDER — CLOPIDOGREL BISULFATE 75 MG/1
75 TABLET ORAL DAILY
Qty: 90 TABLET | Refills: 1 | Status: SHIPPED | OUTPATIENT
Start: 2022-12-05

## 2022-12-05 SDOH — ECONOMIC STABILITY: FOOD INSECURITY: WITHIN THE PAST 12 MONTHS, THE FOOD YOU BOUGHT JUST DIDN'T LAST AND YOU DIDN'T HAVE MONEY TO GET MORE.: NEVER TRUE

## 2022-12-05 SDOH — ECONOMIC STABILITY: FOOD INSECURITY: WITHIN THE PAST 12 MONTHS, YOU WORRIED THAT YOUR FOOD WOULD RUN OUT BEFORE YOU GOT MONEY TO BUY MORE.: NEVER TRUE

## 2022-12-05 SDOH — ECONOMIC STABILITY: FOOD INSECURITY: WITHIN THE PAST 12 MONTHS, THE FOOD YOU BOUGHT JUST DIDN'T LAST AND YOU DIDN'T HAVE MONEY TO GET MORE.: SOMETIMES TRUE

## 2022-12-05 SDOH — ECONOMIC STABILITY: FOOD INSECURITY: WITHIN THE PAST 12 MONTHS, YOU WORRIED THAT YOUR FOOD WOULD RUN OUT BEFORE YOU GOT MONEY TO BUY MORE.: SOMETIMES TRUE

## 2022-12-05 ASSESSMENT — ENCOUNTER SYMPTOMS
SORE THROAT: 0
ABDOMINAL PAIN: 0
SINUS PAIN: 0
VOMITING: 0
COUGH: 0
DIARRHEA: 0
CHEST TIGHTNESS: 0
PHOTOPHOBIA: 0
VOICE CHANGE: 1
SHORTNESS OF BREATH: 0
NAUSEA: 0
COLOR CHANGE: 0
SINUS PRESSURE: 0
BACK PAIN: 0

## 2022-12-05 ASSESSMENT — PATIENT HEALTH QUESTIONNAIRE - PHQ9
3. TROUBLE FALLING OR STAYING ASLEEP: 1
SUM OF ALL RESPONSES TO PHQ QUESTIONS 1-9: 1
9. THOUGHTS THAT YOU WOULD BE BETTER OFF DEAD, OR OF HURTING YOURSELF: 0
8. MOVING OR SPEAKING SO SLOWLY THAT OTHER PEOPLE COULD HAVE NOTICED. OR THE OPPOSITE, BEING SO FIGETY OR RESTLESS THAT YOU HAVE BEEN MOVING AROUND A LOT MORE THAN USUAL: 0
SUM OF ALL RESPONSES TO PHQ QUESTIONS 1-9: 1
SUM OF ALL RESPONSES TO PHQ QUESTIONS 1-9: 1
1. LITTLE INTEREST OR PLEASURE IN DOING THINGS: 0
6. FEELING BAD ABOUT YOURSELF - OR THAT YOU ARE A FAILURE OR HAVE LET YOURSELF OR YOUR FAMILY DOWN: 0
5. POOR APPETITE OR OVEREATING: 0
2. FEELING DOWN, DEPRESSED OR HOPELESS: 0
SUM OF ALL RESPONSES TO PHQ9 QUESTIONS 1 & 2: 0
4. FEELING TIRED OR HAVING LITTLE ENERGY: 0
SUM OF ALL RESPONSES TO PHQ QUESTIONS 1-9: 1
7. TROUBLE CONCENTRATING ON THINGS, SUCH AS READING THE NEWSPAPER OR WATCHING TELEVISION: 0
10. IF YOU CHECKED OFF ANY PROBLEMS, HOW DIFFICULT HAVE THESE PROBLEMS MADE IT FOR YOU TO DO YOUR WORK, TAKE CARE OF THINGS AT HOME, OR GET ALONG WITH OTHER PEOPLE: 0

## 2022-12-05 ASSESSMENT — SOCIAL DETERMINANTS OF HEALTH (SDOH): HOW HARD IS IT FOR YOU TO PAY FOR THE VERY BASICS LIKE FOOD, HOUSING, MEDICAL CARE, AND HEATING?: NOT HARD AT ALL

## 2022-12-05 NOTE — PROGRESS NOTES
[facility]  [address] [address]  Dept: [phone]       Ashli Victor is a 57 y.o. male who presents today for his  medical conditions/complaintsas noted below. Ashli Victor is c/o of New Patient (New to provider) and Knee Pain (Pt c/o bilateral knee pain on going for years )      HPI:     HPI    Patient is here to establish with provider. Was previously followed by Dr. Simba Pleitez. Patient presents with a multitude of complaints today. Patient states he has been having trouble hearing from bilateral ears. States a sensation of fullness and dull tone without any acute pain. States he has tried over-the-counter eardrops without relief. Patient complains of worsening/chronic bilateral knee pain. Symptoms are aggravated by walking in the colder temperatures. States uses warm compresses occasionally but has not tried any other supportive measures. Patient does use a cane chronically but states he has been using it more with increased knee pain. He denies any acute trauma/injury and denies any associated numbness or tingling  Both knees, worsens with ambulation, aggravated bycold weather. Of note, he also is describing symptoms consistent with claudication to the left calf. There is no erythema or swelling noted to the affected area. States symptoms been ongoing for a month. Patient estimates 4-6 steps before pain ensues pain is alleviated by rest.  Denies any associated shortness of breath. Patient also complains of right shoulder pain. Pain is aggravated by extending arms over the head and lifting motion. He does describe associated tingling down the affected extremity. Denies any acute pain/injury. During her exam, patient demonstrated acute change in vocal tone. Voice all of a sudden became whisper like and strained.   Patient states this happens frequently when he gets pain to his previous CABG incision. He denies any difficulty swallowing or speaking. Denies a sensation of any mass/occlusion or \"throat closing\". He did have a CT scan of the soft tissues/neck done approximately 2 months ago which was negative. There is no associated chest pain or shortness of breath. Swallowing is intact and oral cavity is free from obstruction.     Past Medical History:   Diagnosis Date    Anxiety     Atherosclerosis of autologous vein coronary artery bypass graft 3/29/2007    CAD (coronary artery disease)     Chronic back pain     Congenital heart disease     Depression     Headache(784.0)     Herpes zoster keratoconjunctivitis 2017    History of colon polyps 10/15/2019    tubular adenoma x1    Hx of heart artery stent     pt states he has heart stents x 10    Hyperlipidemia 2014    Hypertension     LONG TERM ANTICOAGULENT USE     MI (myocardial infarction) ([facility] [address]     Opioid withdrawal ([facility] [facility] 2018    Osteoarthritis     Radicular syndrome of left leg 11/15/2016    Restless legs syndrome     S/P CABG x 4     Shingles outbreak 02/10/2017    in left eye       Past Surgical History:   Procedure Laterality Date    [facility]      pt states he has heart stents x 10    COLONOSCOPY N/A 10/15/2019    COLONOSCOPY POLYPECTOMY SNARE/COLD performed by Otto Lomas MD at [place] [address]  ,     CABG x 4    UPPER GASTROINTESTINAL ENDOSCOPY       Family History   Problem Relation Age of Onset    Diabetes Mother     Hypertension Mother     Heart Disease Father     Cancer Father      Social History     Tobacco Use    Smoking status: Former     Packs/day: 1.00     Years: 20.00     Pack years: 20.00     Types: Cigarettes     Quit date: 10/24/1999     Years since quittin.1    Smokeless tobacco: Never   Substance Use Topics    Alcohol use: No      Current Outpatient Medications   Medication Sig Dispense Refill    aspirin (ASPIRIN CHILDRENS) 81 MG chewable tablet Take 1 tablet by mouth daily 30 tablet 3    atorvastatin (LIPITOR) 40 MG tablet Take 1 tablet by mouth daily 90 tablet 0    clopidogrel (PLAVIX) 75 MG tablet Take 1 tablet by mouth daily 90 tablet 1    metoprolol tartrate (LOPRESSOR) 25 MG tablet Take 1 tablet by mouth 2 times daily 60 tablet 3    nitroGLYCERIN (NITROSTAT) 0.4 MG SL tablet Place 1 tablet under the tongue every 5 minutes as needed for Chest pain 25 tablet 1    diclofenac sodium (VOLTAREN) 1 % GEL Apply 4 g topically 4 times daily 150 g 1    acetaminophen (TYLENOL) 500 MG tablet Take 2 tablets by mouth every 6 hours as needed for Pain 60 tablet 0     No current facility-administered medications for this visit. No Known Allergies    Health Maintenance   Topic Date Due    Shingles vaccine (1 of 2) Never done    Annual Wellness Visit (AWV)  Never done    COVID-19 Vaccine (2 - Booster for Flit series) 07/06/2021    Flu vaccine (1) 12/05/2023 (Originally 8/1/2022)    Lipids  01/12/2023    Depression Monitoring  12/05/2023    Colorectal Cancer Screen  10/15/2024    DTaP/Tdap/Td vaccine (3 - Td or Tdap) 06/27/2032    Hepatitis C screen  Addressed    HIV screen  Addressed    Hepatitis A vaccine  Aged Out    Hib vaccine  Aged Out    Meningococcal (ACWY) vaccine  Aged Out    Pneumococcal 0-64 years Vaccine  Aged Out       Review of Systems   Constitutional:  Negative for activity change, appetite change and fever. HENT:  Positive for hearing loss and voice change. Negative for sinus pressure, sinus pain and sore throat. Eyes:  Negative for photophobia and visual disturbance. Respiratory:  Negative for cough, chest tightness and shortness of breath. Cardiovascular:  Negative for chest pain. Gastrointestinal:  Negative for abdominal pain, diarrhea, nausea and vomiting. Endocrine: Negative for polydipsia and polyuria. Genitourinary:  Negative for difficulty urinating. Musculoskeletal:  Positive for arthralgias. Negative for back pain and neck pain. Skin:  Negative for color change. Neurological:  Negative for dizziness, light-headedness and headaches. Psychiatric/Behavioral:  Negative for behavioral problems. Objective:     Physical Exam  Vitals and nursing note reviewed. Constitutional:       General: He is not in acute distress. Appearance: Normal appearance. HENT:      Head: Normocephalic. Right Ear: Ear canal and external ear normal. No decreased hearing noted. There is impacted cerumen. Left Ear: Ear canal and external ear normal. No decreased hearing noted. There is impacted cerumen. Nose: Nose normal. No congestion or rhinorrhea. Mouth/Throat:      Mouth: Mucous membranes are dry. Eyes:      Conjunctiva/sclera: Conjunctivae normal.      Pupils: Pupils are equal, round, and reactive to light. Cardiovascular:      Rate and Rhythm: Normal rate and regular rhythm. Pulses: Normal pulses. Heart sounds: Normal heart sounds. No murmur heard. Pulmonary:      Effort: Pulmonary effort is normal. No respiratory distress. Breath sounds: Normal breath sounds. No wheezing. Abdominal:      Palpations: Abdomen is soft. Tenderness: There is no abdominal tenderness. Musculoskeletal:         General: No swelling or signs of injury. Right shoulder: Tenderness present. Decreased range of motion. Cervical back: Normal range of motion. Right knee: Crepitus present. No swelling or effusion. Tenderness present. Left knee: Crepitus present. No swelling or effusion. Tenderness present. Right lower leg: No edema. Left lower leg: No edema. Skin:     General: Skin is warm and dry. Capillary Refill: Capillary refill takes less than 2 seconds. Neurological:      General: No focal deficit present. Mental Status: He is alert and oriented to person, place, and time.  Mental status is at baseline. Motor: No weakness. Gait: Gait normal.   Psychiatric:         Behavior: Behavior normal.       Assessment:      No results found for this visit on 12/05/22. Jenny Villasenor was seen today for new patient and knee pain. Diagnoses and all orders for this visit:    Chronic pain of both knees  -     [facility]  [place]  -     diclofenac sodium (VOLTAREN) 1 % GEL; Apply 4 g topically 4 times daily    Disorder of vocal cord  -     [facility] - Simon Salamanca MD, Otolaryngology, [place]    Acute pain of right shoulder  -     XR SHOULDER RIGHT (MIN 2 VIEWS); Future    Claudication (HCC)  -     VL DUP LOWER EXTREMITY ARTERIES BILATERAL; Future    Medication refill  -     atorvastatin (LIPITOR) 40 MG tablet; Take 1 tablet by mouth daily  -     clopidogrel (PLAVIX) 75 MG tablet; Take 1 tablet by mouth daily    Coronary artery disease involving native coronary artery of native heart without angina pectoris  -     aspirin (ASPIRIN CHILDRENS) 81 MG chewable tablet; Take 1 tablet by mouth daily    Primary hypertension  -     metoprolol tartrate (LOPRESSOR) 25 MG tablet; Take 1 tablet by mouth 2 times daily    Other orders  -     nitroGLYCERIN (NITROSTAT) 0.4 MG SL tablet; Place 1 tablet under the tongue every 5 minutes as needed for Chest pain    In office ear irrigation completed. Tympanic membranes visualized and clear. External canal free of debris. Patient tolerated well and reports improvement in symptoms. Patient instructed to keep cardiology follow-up to further discuss symptoms of claudication    Patient given referral for PT for chronic knee pain and right shoulder pain. Referral given to ENT for further vocal cord assessment     Return in about 6 weeks (around 1/16/2023). Plan of care reviewed with patient. Questions and concerns addressed to patient satisfaction. Follow up as directed.      Electronically signed by CJ Worley CNP, PACon 12/5/2022 at 11:21 AM

## 2022-12-21 ENCOUNTER — HOSPITAL ENCOUNTER (OUTPATIENT)
Dept: GENERAL RADIOLOGY | Age: 62
Discharge: HOME | End: 2022-12-23
Payer: MEDICARE

## 2022-12-21 ENCOUNTER — HOSPITAL ENCOUNTER (OUTPATIENT)
Dept: PHYSICAL THERAPY | Age: 62
Setting detail: THERAPIES SERIES
Discharge: HOME | End: 2022-12-21
Payer: MEDICARE

## 2022-12-21 ENCOUNTER — HOSPITAL ENCOUNTER (OUTPATIENT)
Age: 62
Discharge: HOME | End: 2022-12-23
Payer: MEDICARE

## 2022-12-21 DIAGNOSIS — M25.511 ACUTE PAIN OF RIGHT SHOULDER: ICD-10-CM

## 2022-12-21 PROCEDURE — 97161 PT EVAL LOW COMPLEX 20 MIN: CPT

## 2022-12-21 PROCEDURE — 97110 THERAPEUTIC EXERCISES: CPT

## 2022-12-21 PROCEDURE — 73030 X-RAY EXAM OF SHOULDER: CPT

## 2022-12-21 NOTE — CONSULTS
[address]  [place]: [phone]   F: [phone]     Physical Therapy Evaluation    Date:  2022  Patient: Tawanna Newman  : 1960  MRN: [id number]  Physician: CJ Cortez CNP     Insurance: Medicare (Bo Franco, [facility]) Secondary: [place] medicaid  Medical Diagnosis: M25.561, M25.562, G89.29 (ICD-10-CM) - Chronic pain of both knees    Rehab Codes: M25.561 right knee pain, M25.562 left knee pain  Onset Date: 2019                                 Next 's appt: TBD    Subjective:   CC:B knee pain chronic  HPI: Patient reports that pain is in L>R knees and is a dull achy pain over the last 3 years. He reports that is he puts heat on it does get better and he is able to move around. He reports that it has gotten worse over the years. He currently uses a cane and reports he uses it more in the winter and less in the summer. He has not had PT in the past for the knee. He would like to walk more without pain. Standing endurance is 45 min. He reports that pain is in L calf that gets worse with walking. He has not gotten doppler of calf or X rays of R shoulder or knees. He reports that R shoulder pain but has not gotten a referral for it.    Aggravating factors: cold, walking, AM is the worst.    Alleviating factors: heat pack    PMHx: [] Unremarkable [] Diabetes [x] HTN  [] Pacemaker   [x] MI/Heart Problems [] Cancer [x] Arthritis [] Asthma                         [x] refer to full medical chart  In EPIC  [x] Other:  CABG ,      Comorbidities:   [] Obesity [] Dialysis  [] Other:   [] Asthma/COPD [] Dementia [] Other:   [] Stroke [] Sleep apnea [] Other:   [x] Vascular disease [] Rheumatic disease [] Other:     Tests: [] X-Ray: [] MRI:  [x] Other: No recent imaging    Medications: [x] Refer to full medical record [] None [] Other:  Allergies:      [x] Refer to full medical record  [] None [] Other:    Function: Patient lives with: Alone   In what type of home [x]  One story   [] Two story   [] Split level   Number of stairs to enter 14   With handrail on the [x]  Right to enter   [] Left to enter   Bathroom has a []  Tub only  [] Tub/shower combo   [x] Walk in shower    []  Grab bars   Washing machine is on []  Main level   [] Second level   [] Basement     ADL/IADL Previous level of function Current level of function Comments   Bathing  [x] Independent  [] Deficit [x] Independent  [] Deficit    Dress/grooming [x] Independent  [] Deficit [] Independent  [x] Deficit Pants, putting on socks   Transfer/mobility [x] Independent  [] Deficit [x] Independent  [] Deficit    Feeding [x] Independent  [] Deficit [x] Independent  [] Deficit    Toileting [x] Independent  [] Deficit [x] Independent  [] Deficit    Driving [x] Independent  [] Deficit [x] Independent  [] Deficit    Housekeeping [x] Independent  [] Deficit [x] Independent  [] Deficit    Grocery shop/meal prep [x] Independent  [] Deficit [] Independent  [x] Deficit He uses cart to support        Gait Prior level of function Current level of function    [x] Independent  [] Assist [] Independent  [x] Assist   Device: [] Independent [] Independent    [] Straight Cane [] Quad cane [x] Straight Cane [] Quad cane    [] Standard walker [] Rolling walker   [] 4 wheeled walker [] Standard walker [] Rolling walker   [] 4 wheeled walker    [] Wheelchair [] Wheelchair     Working:  [] Full-time  [] Part-time  [] Off d/t condition  [] Retired  [x] Disability  [] N/A    Pain:  [x] Yes  [] No Location: B knees, R shoulder Pain Rating: (0-10 scale) 7/10, 9/10  Pain altered Tx:  [] Yes  [x] No  Action:    Objective: p = pain, L = Lacks      ROM  ° A/P STRENGTH    Left Right Left Right   Hip Flexion  [facility] 4/5 4/5   Ext [facility] 4/5 4/5   Abd [facility] 4/5 4/5   Add [facility] 4/5 4/5   Knee Extension 0- 0-130 4/5 4/5   Flexion   4/5 4/5   Ankle DF [facility] 5/5 5/5   PF   4/5 4/5 Functional Tests:  LEFI: 23/80= 28% functional      Carl Fall Risk Assessment    Risk Factor Scale  Score   History of Falls [] Yes  [x] No 25  0 0   Secondary Diagnosis [] Yes  [x] No 15  0 0   Ambulatory Aid [] Furniture  [] Crutches/cane/walker  [x] None/bedrest/wheelchair/nurse 30  15  0 0   IV/Heparin Lock [] Yes  [x] No 20  0 0   Gait/Transferring [] Impaired  [] Weak  [x] Normal/bedrest/immobile 20  10  0 0   Mental Status [] Forgets limitations  [x] Oriented to own ability 15  0 0      Total:0     Based on the Assessment score: check the appropriate box. [x]  No intervention needed   Low =   Score of 0-24    []  Use standard prevention interventions Moderate =  Score of 24-44   [] Give patient handout and discuss fall prevention strategies   [] Establish goal of education for patient/family RE: fall prevention strategies    []  Use high risk prevention interventions High = Score of 45 and higher   [] Give patient handout and discuss fall prevention strategies   [] Establish goal of education for patient/family Re: fall prevention strategies   [] Discuss lifeline / other resources    OBSERVATION Comments   Posture No Deficit    Joint Alignment No Deficit    Gait Decreased WB on R, straight cane   Palpation TTP L calf mid muscle belly   Edema No Deficit    Neurological No Deficit      Assessment:  Pt is a 56 y/o male with chronic history of B knee pain. He presents today with L knee pain worse than R. He demonstrates slight knee flexion through gait with decreased WB. He ambulated with straight cane. Pain is global primarily in L knee. L knee extension ROM is decreased compared to the R with gross B lower extremity strength deficits and decreased functional ability include walking endurance. Patient will benefit from skilled physical therapy services to decrease pain, improve ROM, improve lower extremity strength and functional ability so he can improve walking ability around the community.   Patient does demonstrate L calf pain that increases with walking and is tender mid muscle belly and has a venous doppler ordered through his primary care physician to rule out possible DVT or claudication. Will schedule PT once he has gone through with that order and is cleared. Problems:    [x] ? Pain:7/10 B knee   [x] ? ROM: L knee extension  [x] ? Strength: Gross B lower extremity strength  [x] ? Function: LEFI 28% functional  [] Other:    STG: (to be met in 6 treatments)  ? Pain: 4/10 B knee pain or less with walking to improve ability to ambulate in the community  ? ROM: L knee extension to neutral to improve gait mechanics and reduce anterior stress on L knee  ? Strength: Gross B lower extremity strength to 4+/5 to improve ability to perform daily activities such as grocery shopping with decrease reliance on cart for support   ? Function: LEFI to 38% functional or better to improve ability to daily tasks such as housekeeping or squatting to  objects off the floor. Independent with Home Exercise Programs    LTG: (to be met in 12 treatments)  Patient will report being able to stand for 1 hour without increase in B knee pain to demonstrate increase in weight bearing tolerance for activities around the house such as vacuuming. Patient will be able to go up and down stairs in the community room 3x without increase in knee pain to demonstrate improvement in lower extremity strength and increased ability to negotiate stairs going into his house. Patient goals: increase walking ability    Rehab Potential:  [x] Good  [] Fair  [] Poor   Suggested Professional Referral:  [] No  [x] Yes: Patient to get doppler of L calf prior to beginning therapy to clear for DVT.    Barriers to Goal Achievement[de-identified]  [x] No  [] Yes:  Domestic Concerns:  [x] No  [] Yes:    Pt. Education:  [x] Plans/Goals, Risks/Benefits discussed  [x] Home exercise program: See chart below  Method of Education: [x] Verbal  [x] Demo  [x] Written  Comprehension of Education:  [x] Verbalizes understanding. [x] Demonstrates understanding. [x] Needs Review. [] Demonstrates/verbalizes understanding of HEP/Ed previously given. Access Code: [email or web address]  URL: [email or web address]  Date: 12/21/2022  Prepared by: Chastity Trent    Exercises  Supine Short Arc Quad - 1 x daily - 4-5 x weekly - 2-3 sets - 10 reps  Seated Long Arc Quad - 1 x daily - 4-5 x weekly - 2-3 sets - 10 reps  Seated Hamstring Stretch - 1 x daily - 7 x weekly - 3 sets - 10-30sec hold  Bent Knee Fallouts - 1 x daily - 4-5 x weekly - 3 sets - 10 reps      Treatment Plan:  [x] Therapeutic Exercise   65501  [x] Vasopneumatic cold with compression  50154    [x] Therapeutic Activity  84868 [x] Cold/hotpack    [x] Gait Training   52353 [] Lumbar/Cervical Traction  [id number]   [x] Neuromuscular Re-education  [id number] [x] Electrical Stimulation Unattended  01657   [x] Manual Therapy    74519 [] Electrical Stimulation Attended  [id number]   [] Aquatics Therapy    87297 ---------------   [] Dry Needling 1 or 2 muscles  43590 [] Iontophoresis: 4 mg/mL Dexamethasone Sodium Phosphate  mAmin  62524   [] Dry Needling 3 or more muscles  20517 []  Medication allergies reviewed for use of   Dexamethasone Sodium Phosphate 4mg/ml with iontophoresis treatments. Pt is not allergic.        Frequency:  2 x/week for 12 visits    Todays Treatment:  Precautions:  Modalities:   Exercises:  Exercise Reps/ Time Weight/ Level Comments   SAQ 10x     LAQ 10x     Seated hamstring stretch 3x10\"     BKFO x10 Red                Other:    Specific Instructions for next treatment[de-identified] L knee extension, Gross LE strengthening      Evaluation Complexity:  History (Personal factors, comorbidities) [] 0 [x] 1-2 [] 3+   Exam (limitations, restrictions) [] 1-2 [x] 3 [] 4+   Clinical presentation (progression) [x] Stable [] Evolving  [] Unstable   Decision Making [x] Low [] Moderate [] High    [x] Low Complexity [] Moderate Complexity [] High Complexity       Treatment Charges: Mins Units   [x] Evaluation       [x]  Low       []  Moderate       []  High 40 1   []  Modalities     [x]  Ther Exercise 15 1   []  Manual Therapy     []  Ther Activities     []  Aquatics     []  Vasocompression     []  Other       TOTAL TREATMENT TIME: 55 min      Time in:09:45am     Time out:10:40am    Electronically signed by: Balbina Hernandez PT        Physician Signature:________________________________Date:__________________  By signing above or cosigning this note, I have reviewed this plan of care and certify a need for medically necessary rehabilitation services.      *PLEASE SIGN ABOVE AND FAX BACK ALL PAGES*

## 2022-12-22 ENCOUNTER — TELEPHONE (OUTPATIENT)
Dept: PRIMARY CARE CLINIC | Age: 62
End: 2022-12-22

## 2022-12-22 DIAGNOSIS — M79.605 LOWER EXTREMITY PAIN, LEFT: Primary | ICD-10-CM

## 2022-12-22 DIAGNOSIS — I73.9 CLAUDICATION OF LEFT LOWER EXTREMITY (HCC): ICD-10-CM

## 2022-12-22 NOTE — TELEPHONE ENCOUNTER
Please call patient and let him know I received a note from physical therapy suggesting he is having new left calf pain with claudication. The patient did not mention this to me on our last exam, so I have placed an order for dopplers and SUKI to rule out DVT and claudication.   Thanks

## 2022-12-28 ENCOUNTER — TELEPHONE (OUTPATIENT)
Dept: PRIMARY CARE CLINIC | Age: 62
End: 2022-12-28

## 2022-12-28 DIAGNOSIS — M25.511 ACUTE PAIN OF RIGHT SHOULDER: Primary | ICD-10-CM

## 2022-12-28 NOTE — TELEPHONE ENCOUNTER
----- Message from Kendall Rubalcava MA sent at 12/27/2022  1:12 PM EST -----  Pt notified- Pt would like an order to PT.

## 2022-12-29 ENCOUNTER — HOSPITAL ENCOUNTER (OUTPATIENT)
Dept: VASCULAR LAB | Age: 62
Discharge: HOME | End: 2022-12-29
Payer: MEDICARE

## 2022-12-29 DIAGNOSIS — I73.9 CLAUDICATION OF LEFT LOWER EXTREMITY (HCC): ICD-10-CM

## 2022-12-29 DIAGNOSIS — M79.605 LOWER EXTREMITY PAIN, LEFT: ICD-10-CM

## 2022-12-29 PROCEDURE — 93971 EXTREMITY STUDY: CPT

## 2022-12-29 PROCEDURE — 93922 UPR/L XTREMITY ART 2 LEVELS: CPT

## 2023-01-09 ENCOUNTER — HOSPITAL ENCOUNTER (OUTPATIENT)
Dept: PHYSICAL THERAPY | Age: 63
Setting detail: THERAPIES SERIES
Discharge: HOME | End: 2023-01-09
Payer: MEDICARE

## 2023-01-09 PROCEDURE — 97110 THERAPEUTIC EXERCISES: CPT

## 2023-01-09 PROCEDURE — 97016 VASOPNEUMATIC DEVICE THERAPY: CPT

## 2023-01-09 NOTE — FLOWSHEET NOTE
[facility]   [address]   Phone: [phone]   Fax: [phone]    Physical Therapy Daily Treatment Note      Date:  2023  Patient Name:  Elidia Hill    :  1960  MRN: [id number]  Physician: CJ Mccall CNP                                    Insurance: Medicare (Ingr[employer] Cason, TRACK [address] [facility] CAP) Secondary: [place] medicaid  Medical Diagnosis: M25.561, M25.562, G89.29 (ICD-10-CM) - Chronic pain of both knees                Rehab Codes: M25.561 right knee pain, M25.562 left knee pain  Onset Date: 2019                                 Next 's appt: TBD  Visit# / total visits:   Cancels/No Shows: 0/0    Precautions: None. Subjective:     Pain:  [x] Yes  [] No Location: L knee Pain Rating: (0-10 scale) 7/10  Pain altered Tx:  [x] No  [] Yes  Action:  Comments: Patient arrives 15 minutes late to appt and states he has been doing stretching at home but still has a lot pain. Patient also requesting to be scheduled to evaluate his shoulder with the new script he has as well. Objective:  Modalities: Vaso to L knee in seated position with knee extended on step, x 10 min, low pressure 34*    INTERVENTIONS  Reps/ Time Weight/ Level Completed  Today Comments          EXERCISES        Supine:       HS/gastroc stretch 3x20''  x strap   Quad sets with towel roll under heel 10x3''  x    SAQ 10x2  x 3 sec hold   BKFO 10x2 Red     SLR 10x2  x           Seated:       LAQ 10x3''  x           Standing:       TKE 10x5'' Green x    TG squats 10x Lvl 10 x                  Other:    Specific Instructions for next treatment L knee extension, Gross LE strengthening       Assessment: [] Progressing toward goals. [] No change. [x] Other: First session after eval and limited on time due to patients late arrival. Initiated session with stretching, followed by exercises with focus on quad strength and knee stability.  Patient had increased pain with all exercises at end range knee extension. Ended session with vaso for pain management. [x] Patient would continue to benefit from skilled physical therapy services in order to: decrease pain, improve ROM, improve lower extremity strength and functional ability so he can improve walking ability around the community    GOALS:   STG: (to be met in 6 treatments)  ? Pain: 4/10 B knee pain or less with walking to improve ability to ambulate in the community  ? ROM: L knee extension to neutral to improve gait mechanics and reduce anterior stress on L knee  ? Strength: Gross B lower extremity strength to 4+/5 to improve ability to perform daily activities such as grocery shopping with decrease reliance on cart for support   ? Function: LEFI to 38% functional or better to improve ability to daily tasks such as housekeeping or squatting to  objects off the floor. Independent with Home Exercise Programs     LTG: (to be met in 12 treatments)  Patient will report being able to stand for 1 hour without increase in B knee pain to demonstrate increase in weight bearing tolerance for activities around the house such as vacuuming. Patient will be able to go up and down stairs in the community room 3x without increase in knee pain to demonstrate improvement in lower extremity strength and increased ability to negotiate stairs going into his house. Patient goals: increase walking ability     Pt. Education:  [x] Yes  [] No  [x] Reviewed Prior HEP/Ed  Method of Education: [x] Verbal  [] Demo  [] Written  Education: Icing for pain, HEP  Comprehension of Education:  [x] Verbalizes understanding. [] Demonstrates understanding. [] Needs review. [x] Demonstrates/verbalizes HEP/Ed previously given. Access Code: [email or web address]  URL: [email or web address]  Date: 12/21/2022  Prepared by: Rosalinda Pollack     Exercises  Supine Short Arc Quad - 1 x daily - 4-5 x weekly - 2-3 sets - 10 reps  Seated Long Arc Quad - 1 x daily - 4-5 x weekly - 2-3 sets - 10 reps  Seated Hamstring Stretch - 1 x daily - 7 x weekly - 3 sets - 10-30sec hold  Bent Knee Fallouts - 1 x daily - 4-5 x weekly - 3 sets - 10 reps       Plan: [x] Continue per plan of care.    [] Other:      Treatment Charges: Mins Units   []  Modalities     [x]  Ther Exercise 29 2   []  Manual Therapy     []  Ther Activities     []  Aquatics     []  Neuromuscular     [x] Vasocompression 10 1   [] Gait Training     [] Dry needling        [] 1 or 2 muscles        [] 3 or more muscles     []  Other     Total Treatment time 39 3     Time In: 12:30            Time Out: 1:09    Electronically signed by:  Chaitanya Carey PTA

## 2023-01-17 ENCOUNTER — OFFICE VISIT (OUTPATIENT)
Dept: PRIMARY CARE CLINIC | Age: 63
End: 2023-01-17
Payer: MEDICARE

## 2023-01-17 VITALS
DIASTOLIC BLOOD PRESSURE: 64 MMHG | OXYGEN SATURATION: 96 % | WEIGHT: 143 LBS | HEIGHT: 65 IN | SYSTOLIC BLOOD PRESSURE: 104 MMHG | BODY MASS INDEX: 23.82 KG/M2 | HEART RATE: 54 BPM

## 2023-01-17 DIAGNOSIS — M25.521 RIGHT ELBOW PAIN: Primary | ICD-10-CM

## 2023-01-17 PROCEDURE — G8420 CALC BMI NORM PARAMETERS: HCPCS | Performed by: NURSE PRACTITIONER

## 2023-01-17 PROCEDURE — G8484 FLU IMMUNIZE NO ADMIN: HCPCS | Performed by: NURSE PRACTITIONER

## 2023-01-17 PROCEDURE — 99213 OFFICE O/P EST LOW 20 MIN: CPT | Performed by: NURSE PRACTITIONER

## 2023-01-17 PROCEDURE — G8427 DOCREV CUR MEDS BY ELIG CLIN: HCPCS | Performed by: NURSE PRACTITIONER

## 2023-01-17 PROCEDURE — 3074F SYST BP LT 130 MM HG: CPT | Performed by: NURSE PRACTITIONER

## 2023-01-17 PROCEDURE — 3017F COLORECTAL CA SCREEN DOC REV: CPT | Performed by: NURSE PRACTITIONER

## 2023-01-17 PROCEDURE — 3078F DIAST BP <80 MM HG: CPT | Performed by: NURSE PRACTITIONER

## 2023-01-17 PROCEDURE — 1036F TOBACCO NON-USER: CPT | Performed by: NURSE PRACTITIONER

## 2023-01-17 RX ORDER — MELOXICAM 7.5 MG/1
7.5 TABLET ORAL DAILY PRN
Qty: 30 TABLET | Refills: 0 | Status: SHIPPED | OUTPATIENT
Start: 2023-01-17 | End: 2023-02-16

## 2023-01-17 ASSESSMENT — PATIENT HEALTH QUESTIONNAIRE - PHQ9
7. TROUBLE CONCENTRATING ON THINGS, SUCH AS READING THE NEWSPAPER OR WATCHING TELEVISION: 0
3. TROUBLE FALLING OR STAYING ASLEEP: 0
9. THOUGHTS THAT YOU WOULD BE BETTER OFF DEAD, OR OF HURTING YOURSELF: 0
10. IF YOU CHECKED OFF ANY PROBLEMS, HOW DIFFICULT HAVE THESE PROBLEMS MADE IT FOR YOU TO DO YOUR WORK, TAKE CARE OF THINGS AT HOME, OR GET ALONG WITH OTHER PEOPLE: 0
5. POOR APPETITE OR OVEREATING: 0
8. MOVING OR SPEAKING SO SLOWLY THAT OTHER PEOPLE COULD HAVE NOTICED. OR THE OPPOSITE, BEING SO FIGETY OR RESTLESS THAT YOU HAVE BEEN MOVING AROUND A LOT MORE THAN USUAL: 0
SUM OF ALL RESPONSES TO PHQ QUESTIONS 1-9: 0
SUM OF ALL RESPONSES TO PHQ QUESTIONS 1-9: 0
4. FEELING TIRED OR HAVING LITTLE ENERGY: 0
SUM OF ALL RESPONSES TO PHQ QUESTIONS 1-9: 0
SUM OF ALL RESPONSES TO PHQ QUESTIONS 1-9: 0
2. FEELING DOWN, DEPRESSED OR HOPELESS: 0
6. FEELING BAD ABOUT YOURSELF - OR THAT YOU ARE A FAILURE OR HAVE LET YOURSELF OR YOUR FAMILY DOWN: 0

## 2023-01-17 ASSESSMENT — ENCOUNTER SYMPTOMS
COUGH: 0
PHOTOPHOBIA: 0
ABDOMINAL PAIN: 0
SINUS PAIN: 0
SINUS PRESSURE: 0
DIARRHEA: 0
COLOR CHANGE: 0
CHEST TIGHTNESS: 0
SHORTNESS OF BREATH: 0
SORE THROAT: 0
NAUSEA: 0
BACK PAIN: 0
VOMITING: 0

## 2023-01-17 NOTE — PROGRESS NOTES
[facility]  [facility]  [address] [place] [address]  Dept: [phone]       Shilo Alcantara is a 62 y.o. male who presents today for his  medical conditions/complaintsas noted below.  Shilo Alcantara is c/o of Hand Pain (Pt has been having pain in his right hand and in his wrist to his elbow X 2 weeks pt states it has been swollen and has been hearing a cracking sound )      HPI:     HPI    Patient is here for same-day visit for right forearm pain and elbow pain.  Patient states he has been \"starting to exercise\" and has developed muscle and elbow soreness.  The pain does extend down into his wrist and he is also complaining of some metacarpal joint pain.  There is no joint swelling, erythema or fevers.  The patient denies any acute trauma or injury.    He does continue to participate in physical therapy which was previously ordered.  Of note, he did have previous diagnosis of claudication in the chart.  DVT completed and negative for DVT along with SUKI which was negative for any insufficiency.    Past Medical History:   Diagnosis Date    Anxiety     Atherosclerosis of autologous vein coronary artery bypass graft 3/29/2007    CAD (coronary artery disease)     Chronic back pain     Congenital heart disease     Depression     Headache(784.0)     Herpes zoster keratoconjunctivitis 2/16/2017    History of colon polyps 10/15/2019    tubular adenoma x1    Hx of heart artery stent     pt states he has heart stents x 10    Hyperlipidemia 5/30/2014    Hypertension     LONG TERM ANTICOAGULENT USE     MI (myocardial infarction) ([facility]) 2007    Opioid withdrawal ([facility]) 1/23/2018    Osteoarthritis     Radicular syndrome of left leg 11/15/2016    Restless legs syndrome     S/P CABG x 4     Shingles outbreak 02/10/2017    in left eye       Past Surgical History:   Procedure Laterality Date    APPENDECTOMY      CARDIAC CATHETERIZATION      CARDIAC  CATHETERIZATION      pt states he has heart stents x 10    COLONOSCOPY N/A 10/15/2019    COLONOSCOPY POLYPECTOMY SNARE/COLD performed by Amairani Sanchez MD at [place] [address]  ,     CABG x 4    UPPER GASTROINTESTINAL ENDOSCOPY       Family History   Problem Relation Age of Onset    Diabetes Mother     Hypertension Mother     Heart Disease Father     Cancer Father      Social History     Tobacco Use    Smoking status: Former     Packs/day: 1.00     Years: 20.00     Pack years: 20.00     Types: Cigarettes     Quit date: 10/24/1999     Years since quittin.2    Smokeless tobacco: Never   Substance Use Topics    Alcohol use: No      Current Outpatient Medications   Medication Sig Dispense Refill    meloxicam (MOBIC) 7.5 MG tablet Take 1 tablet by mouth daily as needed for Pain 30 tablet 0    aspirin (ASPIRIN CHILDRENS) 81 MG chewable tablet Take 1 tablet by mouth daily 30 tablet 3    atorvastatin (LIPITOR) 40 MG tablet Take 1 tablet by mouth daily 90 tablet 0    clopidogrel (PLAVIX) 75 MG tablet Take 1 tablet by mouth daily 90 tablet 1    metoprolol tartrate (LOPRESSOR) 25 MG tablet Take 1 tablet by mouth 2 times daily 60 tablet 3    nitroGLYCERIN (NITROSTAT) 0.4 MG SL tablet Place 1 tablet under the tongue every 5 minutes as needed for Chest pain 25 tablet 1    diclofenac sodium (VOLTAREN) 1 % GEL Apply 4 g topically 4 times daily 150 g 1    acetaminophen (TYLENOL) 500 MG tablet Take 2 tablets by mouth every 6 hours as needed for Pain 60 tablet 0     No current facility-administered medications for this visit.      No Known Allergies    Health Maintenance   Topic Date Due    Shingles vaccine (1 of 2) Never done    Annual Wellness Visit (AWV)  Never done    COVID-19 Vaccine (2 - Booster for Panda series) 2021    Lipids  2023    Flu vaccine (1) 2023 (Originally 2022)    Depression Monitoring  2023    Colorectal Cancer Screen  10/15/2024 DTaP/Tdap/Td vaccine (3 - Td or Tdap) 06/27/2032    Hepatitis C screen  Addressed    HIV screen  Addressed    Hepatitis A vaccine  Aged Out    Hib vaccine  Aged Out    Meningococcal (ACWY) vaccine  Aged Out    Pneumococcal 0-64 years Vaccine  Aged Out       Review of Systems   Constitutional:  Negative for activity change, appetite change and fever. HENT:  Negative for sinus pressure, sinus pain and sore throat. Eyes:  Negative for photophobia and visual disturbance. Respiratory:  Negative for cough, chest tightness and shortness of breath. Cardiovascular:  Negative for chest pain. Gastrointestinal:  Negative for abdominal pain, diarrhea, nausea and vomiting. Endocrine: Negative for polydipsia and polyuria. Genitourinary:  Negative for difficulty urinating. Musculoskeletal:  Positive for arthralgias and myalgias. Negative for back pain and neck pain. Skin:  Negative for color change. Neurological:  Negative for dizziness, light-headedness and headaches. Psychiatric/Behavioral:  Negative for behavioral problems. Objective:     Physical Exam  Vitals and nursing note reviewed. Constitutional:       General: He is not in acute distress. Appearance: Normal appearance. HENT:      Head: Normocephalic. Right Ear: External ear normal.      Left Ear: External ear normal.      Nose: Nose normal. No congestion or rhinorrhea. Mouth/Throat:      Mouth: Mucous membranes are moist.      Pharynx: Oropharynx is clear. Eyes:      Conjunctiva/sclera: Conjunctivae normal.      Pupils: Pupils are equal, round, and reactive to light. Cardiovascular:      Rate and Rhythm: Normal rate and regular rhythm. Pulses: Normal pulses. Heart sounds: Normal heart sounds. No murmur heard. Pulmonary:      Effort: Pulmonary effort is normal. No respiratory distress. Breath sounds: Normal breath sounds. No wheezing. Abdominal:      Palpations: Abdomen is soft. Tenderness:  There is no abdominal tenderness. Musculoskeletal:         General: No swelling or signs of injury. Normal range of motion. Right upper arm: Swelling and tenderness present. No deformity. Left upper arm: Normal.      Right elbow: No effusion. Normal range of motion. Tenderness present. Left elbow: Normal.        Arms:       Cervical back: Normal range of motion. Skin:     General: Skin is warm and dry. Capillary Refill: Capillary refill takes less than 2 seconds. Neurological:      General: No focal deficit present. Mental Status: He is alert and oriented to person, place, and time. Mental status is at baseline. Motor: No weakness. Gait: Gait normal.   Psychiatric:         Behavior: Behavior normal.       Assessment:      No results found for this visit on 01/17/23. Shaq Sosa was seen today for hand pain. Diagnoses and all orders for this visit:    Right elbow pain  -     XR ELBOW RIGHT (2 VIEWS); Future  -     meloxicam (MOBIC) 7.5 MG tablet; Take 1 tablet by mouth daily as needed for Pain     I do suspect this is muscle tension/soreness from acute increase in exercise with physical therapy. Patient instructed on the use of heat/ice and also given at home stretches. No follow-ups on file. Plan of care reviewed with patient. Questions and concerns addressed to patient satisfaction. Follow up as directed.      Electronically signed by CJ Madera CNP, PACon 1/17/2023 at 1:47 PM

## 2023-01-20 ENCOUNTER — CLINICAL DOCUMENTATION (OUTPATIENT)
Dept: PHYSICAL THERAPY | Age: 63
End: 2023-01-20

## 2023-01-30 ENCOUNTER — OFFICE VISIT (OUTPATIENT)
Dept: PRIMARY CARE CLINIC | Age: 63
End: 2023-01-30
Payer: MEDICARE

## 2023-01-30 VITALS
BODY MASS INDEX: 24.16 KG/M2 | DIASTOLIC BLOOD PRESSURE: 70 MMHG | HEIGHT: 65 IN | WEIGHT: 145 LBS | OXYGEN SATURATION: 98 % | SYSTOLIC BLOOD PRESSURE: 131 MMHG | HEART RATE: 47 BPM

## 2023-01-30 DIAGNOSIS — Z13.9 DUE FOR SCREENING: ICD-10-CM

## 2023-01-30 DIAGNOSIS — R07.9 CHEST PAIN, UNSPECIFIED TYPE: ICD-10-CM

## 2023-01-30 DIAGNOSIS — M79.641 PAIN OF RIGHT HAND: Primary | ICD-10-CM

## 2023-01-30 PROCEDURE — G8484 FLU IMMUNIZE NO ADMIN: HCPCS | Performed by: NURSE PRACTITIONER

## 2023-01-30 PROCEDURE — 93000 ELECTROCARDIOGRAM COMPLETE: CPT | Performed by: NURSE PRACTITIONER

## 2023-01-30 PROCEDURE — 3078F DIAST BP <80 MM HG: CPT | Performed by: NURSE PRACTITIONER

## 2023-01-30 PROCEDURE — 99213 OFFICE O/P EST LOW 20 MIN: CPT | Performed by: NURSE PRACTITIONER

## 2023-01-30 PROCEDURE — 3017F COLORECTAL CA SCREEN DOC REV: CPT | Performed by: NURSE PRACTITIONER

## 2023-01-30 PROCEDURE — 3075F SYST BP GE 130 - 139MM HG: CPT | Performed by: NURSE PRACTITIONER

## 2023-01-30 PROCEDURE — G8420 CALC BMI NORM PARAMETERS: HCPCS | Performed by: NURSE PRACTITIONER

## 2023-01-30 PROCEDURE — G8427 DOCREV CUR MEDS BY ELIG CLIN: HCPCS | Performed by: NURSE PRACTITIONER

## 2023-01-30 PROCEDURE — 1036F TOBACCO NON-USER: CPT | Performed by: NURSE PRACTITIONER

## 2023-01-30 ASSESSMENT — ENCOUNTER SYMPTOMS
VOMITING: 0
DIARRHEA: 0
PHOTOPHOBIA: 0
SINUS PRESSURE: 0
SORE THROAT: 0
ABDOMINAL PAIN: 0
CHEST TIGHTNESS: 1
NAUSEA: 0
BACK PAIN: 0
SINUS PAIN: 0
SHORTNESS OF BREATH: 0
COLOR CHANGE: 0
COUGH: 0

## 2023-01-30 NOTE — PROGRESS NOTES
[address] [facility]  [address] [place] [address]  Dept: [phone]       Narayan Lu is a 57 y.o. male who presents today for his  medical conditions/complaintsas noted below. Narayan Lu is c/o of Finger Pain (Pt has been having pain and swelling in his right middle finger with pain and weakness x 2 months )      HPI:     HPI    Patient is here today with complaints of right middle finger pain at the DIP joint. There is no pain on palpation, rubs or erythema at the joint. Patient states pain has been ongoing for several months and is elicited primarily in the hand grasp motion. Hand grasps are equal in strength bilaterally, there is no crepitus, effusion or \"catching\" at the joint on physical exam.  Is requesting an x-ray because \"I know there is something wrong\". Additionally, patient noted to be bradycardic today and states \"my heart has been hurting when walking outside\". Patient states symptoms of chest discomfort have been ongoing for 3 weeks. He denies any particular associated dyspnea but does admit to feeling dizzy/lightheaded with the onset of symptoms. Patient does have history of CAD/CABG x3. Denies any changes to his medications, endorses compliance and states he is overdue for an appointment with his cardiologist.  Patient states any major activity will elicit the chest discomfort and is relieved by \"slowing down\" or resting. Unrelated to the above, patient's right elbow pain has improved since last visit with use of NSAIDs and home exercises/stretches. Is also previous concern for claudication and the patient underwent SUKI and Dopplers to rule out further etiology. Both of which were negative. Patient has continued to participate in physical therapy and states his lower extremity pain has improved.     Past Medical History:   Diagnosis Date    Anxiety     Atherosclerosis of autologous vein coronary artery bypass graft 3/29/2007    CAD (coronary artery disease)     Chronic back pain     Congenital heart disease     Depression     Headache(784.0)     Herpes zoster keratoconjunctivitis 2017    History of colon polyps 10/15/2019    tubular adenoma x1    Hx of heart artery stent     pt states he has heart stents x 10    Hyperlipidemia 2014    Hypertension     LONG TERM ANTICOAGULENT USE     MI (myocardial infarction) ([facility]     Opioid withdrawal ([facility] 2018    Osteoarthritis     Radicular syndrome of left leg 11/15/2016    Restless legs syndrome     S/P CABG x 4     Shingles outbreak 02/10/2017    in left eye       Past Surgical History:   Procedure Laterality Date    [address] [facility]      pt states he has heart stents x 10    COLONOSCOPY N/A 10/15/2019    COLONOSCOPY POLYPECTOMY SNARE/COLD performed by Elis Madrigal MD at [place] [address]  ,     CABG x 4    UPPER GASTROINTESTINAL ENDOSCOPY       Family History   Problem Relation Age of Onset    Diabetes Mother     Hypertension Mother     Heart Disease Father     Cancer Father      Social History     Tobacco Use    Smoking status: Former     Packs/day: 1.00     Years: 20.00     Pack years: 20.00     Types: Cigarettes     Quit date: 10/24/1999     Years since quittin.2    Smokeless tobacco: Never   Substance Use Topics    Alcohol use: No      Current Outpatient Medications   Medication Sig Dispense Refill    meloxicam (MOBIC) 7.5 MG tablet Take 1 tablet by mouth daily as needed for Pain 30 tablet 0    aspirin (ASPIRIN CHILDRENS) 81 MG chewable tablet Take 1 tablet by mouth daily 30 tablet 3    atorvastatin (LIPITOR) 40 MG tablet Take 1 tablet by mouth daily 90 tablet 0    clopidogrel (PLAVIX) 75 MG tablet Take 1 tablet by mouth daily 90 tablet 1    metoprolol tartrate (LOPRESSOR) 25 MG tablet Take 1 tablet by mouth 2 times daily 60 tablet 3 nitroGLYCERIN (NITROSTAT) 0.4 MG SL tablet Place 1 tablet under the tongue every 5 minutes as needed for Chest pain 25 tablet 1    diclofenac sodium (VOLTAREN) 1 % GEL Apply 4 g topically 4 times daily 150 g 1    acetaminophen (TYLENOL) 500 MG tablet Take 2 tablets by mouth every 6 hours as needed for Pain 60 tablet 0     No current facility-administered medications for this visit. No Known Allergies    Health Maintenance   Topic Date Due    Shingles vaccine (1 of 2) Never done    Annual Wellness Visit (AWV)  Never done    COVID-19 Vaccine (2 - Booster for Panda series) 07/06/2021    Lipids  01/12/2023    Flu vaccine (1) 12/05/2023 (Originally 8/1/2022)    Depression Monitoring  01/17/2024    Colorectal Cancer Screen  10/15/2024    DTaP/Tdap/Td vaccine (3 - Td or Tdap) 06/27/2032    Hepatitis C screen  Addressed    HIV screen  Addressed    Hepatitis A vaccine  Aged Out    Hib vaccine  Aged Out    Meningococcal (ACWY) vaccine  Aged Out    Pneumococcal 0-64 years Vaccine  Aged Out       Review of Systems   Constitutional:  Negative for activity change, appetite change and fever. HENT:  Negative for sinus pressure, sinus pain and sore throat. Eyes:  Negative for photophobia and visual disturbance. Respiratory:  Positive for chest tightness. Negative for cough and shortness of breath. Cardiovascular:  Positive for chest pain. Negative for palpitations and leg swelling. Gastrointestinal:  Negative for abdominal pain, diarrhea, nausea and vomiting. Endocrine: Negative for polydipsia and polyuria. Genitourinary:  Negative for difficulty urinating. Musculoskeletal:  Positive for arthralgias. Negative for back pain, joint swelling and neck pain. Right middle finger DIP joint pain. No abnormalities noted on physical exam   Skin:  Negative for color change. Neurological:  Positive for light-headedness. Negative for dizziness, weakness and headaches.    Psychiatric/Behavioral:  Negative for behavioral problems. The patient is not nervous/anxious. Objective:     Physical Exam  Vitals and nursing note reviewed. Constitutional:       General: He is not in acute distress. Appearance: Normal appearance. HENT:      Head: Normocephalic. Right Ear: External ear normal.      Left Ear: External ear normal.      Nose: Nose normal. No congestion or rhinorrhea. Mouth/Throat:      Mouth: Mucous membranes are dry. Eyes:      Conjunctiva/sclera: Conjunctivae normal.      Pupils: Pupils are equal, round, and reactive to light. Cardiovascular:      Rate and Rhythm: Normal rate and regular rhythm. Pulses: Normal pulses. Heart sounds: Normal heart sounds. No murmur heard. Pulmonary:      Effort: Pulmonary effort is normal. No respiratory distress. Breath sounds: Normal breath sounds. No wheezing. Abdominal:      Palpations: Abdomen is soft. Tenderness: There is no abdominal tenderness. Musculoskeletal:         General: No swelling or signs of injury. Normal range of motion. Cervical back: Normal range of motion. Skin:     General: Skin is warm and dry. Capillary Refill: Capillary refill takes less than 2 seconds. Neurological:      General: No focal deficit present. Mental Status: He is alert and oriented to person, place, and time. Mental status is at baseline. Motor: No weakness. Gait: Gait normal.   Psychiatric:         Behavior: Behavior normal.       Assessment:      No results found for this visit on 01/30/23. Chad Chamberlain was seen today for finger pain. Diagnoses and all orders for this visit:    Pain of right hand  -     XR HAND RIGHT (2 VIEWS); Future    Chest pain, unspecified type  -     Comprehensive Metabolic Panel; Future  -     Troponin; Future  -     EKG 12 Lead - Clinic Performed; Future    Due for screening  -     Lipid Panel;  Future  -     CBC with Auto Differential; Future  -     Hemoglobin A1C; Future    Patient's baseline EKG demonstrates sinus bradycardia. Will complete EKG in office and compare. If no acute changes, will refer patient to his cardiologist as he is overdue for routine appointment. Patient advised if chest pain persist and worsens, he should report immediately to the emergency room. No follow-ups on file. Plan of care reviewed with patient. Questions and concerns addressed to patient satisfaction. Follow up as directed.      Electronically signed by CJ Baird CNP, PACon 1/30/2023 at 11:25 AM

## 2023-02-02 ENCOUNTER — HOSPITAL ENCOUNTER (OUTPATIENT)
Age: 63
Discharge: HOME | End: 2023-02-04
Payer: MEDICARE

## 2023-02-02 ENCOUNTER — HOSPITAL ENCOUNTER (OUTPATIENT)
Dept: GENERAL RADIOLOGY | Age: 63
Discharge: HOME | End: 2023-02-04
Payer: MEDICARE

## 2023-02-02 DIAGNOSIS — M25.521 RIGHT ELBOW PAIN: ICD-10-CM

## 2023-02-02 DIAGNOSIS — M79.641 PAIN OF RIGHT HAND: ICD-10-CM

## 2023-02-02 PROCEDURE — 73130 X-RAY EXAM OF HAND: CPT

## 2023-02-02 PROCEDURE — 73080 X-RAY EXAM OF ELBOW: CPT

## 2023-03-14 DIAGNOSIS — M25.521 RIGHT ELBOW PAIN: ICD-10-CM

## 2023-03-14 RX ORDER — MELOXICAM 7.5 MG/1
7.5 TABLET ORAL DAILY PRN
Qty: 30 TABLET | Refills: 0 | Status: SHIPPED | OUTPATIENT
Start: 2023-03-14 | End: 2023-04-13

## 2023-05-20 DIAGNOSIS — M25.521 RIGHT ELBOW PAIN: ICD-10-CM

## 2023-05-22 RX ORDER — MELOXICAM 7.5 MG/1
7.5 TABLET ORAL DAILY PRN
Qty: 30 TABLET | Refills: 0 | OUTPATIENT
Start: 2023-05-22 | End: 2023-06-21

## 2023-05-22 RX ORDER — MELOXICAM 7.5 MG/1
7.5 TABLET ORAL DAILY PRN
Qty: 30 TABLET | Refills: 0 | Status: SHIPPED | OUTPATIENT
Start: 2023-05-22 | End: 2023-06-21

## 2023-06-05 ENCOUNTER — HOSPITAL ENCOUNTER (OUTPATIENT)
Age: 63
Discharge: HOME | End: 2023-06-07
Payer: MEDICARE

## 2023-06-05 ENCOUNTER — HOSPITAL ENCOUNTER (OUTPATIENT)
Dept: GENERAL RADIOLOGY | Age: 63
Discharge: HOME | End: 2023-06-07
Payer: MEDICARE

## 2023-06-05 ENCOUNTER — OFFICE VISIT (OUTPATIENT)
Dept: PRIMARY CARE CLINIC | Age: 63
End: 2023-06-05
Payer: MEDICARE

## 2023-06-05 ENCOUNTER — HOSPITAL ENCOUNTER (OUTPATIENT)
Age: 63
Discharge: HOME | End: 2023-06-05
Payer: MEDICARE

## 2023-06-05 VITALS
OXYGEN SATURATION: 98 % | HEART RATE: 58 BPM | DIASTOLIC BLOOD PRESSURE: 76 MMHG | HEIGHT: 65 IN | SYSTOLIC BLOOD PRESSURE: 141 MMHG | WEIGHT: 145 LBS | BODY MASS INDEX: 24.16 KG/M2

## 2023-06-05 DIAGNOSIS — M25.562 ACUTE PAIN OF LEFT KNEE: ICD-10-CM

## 2023-06-05 DIAGNOSIS — M25.562 ACUTE PAIN OF LEFT KNEE: Primary | ICD-10-CM

## 2023-06-05 DIAGNOSIS — M25.552 ACUTE PAIN OF LEFT HIP: ICD-10-CM

## 2023-06-05 LAB
ANION GAP SERPL CALCULATED.3IONS-SCNC: 9 MMOL/L (ref 9–17)
BASOPHILS # BLD: 0.06 K/UL (ref 0–0.2)
BASOPHILS NFR BLD: 1 % (ref 0–2)
BUN SERPL-MCNC: 13 MG/DL (ref 8–23)
CALCIUM SERPL-MCNC: 9.2 MG/DL (ref 8.6–10.4)
CHLORIDE SERPL-SCNC: 106 MMOL/L (ref 98–107)
CO2 SERPL-SCNC: 24 MMOL/L (ref 20–31)
CREAT SERPL-MCNC: 0.96 MG/DL (ref 0.7–1.2)
CRP SERPL HS-MCNC: <3 MG/L (ref 0–5)
EOSINOPHIL # BLD: 0.11 K/UL (ref 0–0.44)
EOSINOPHILS RELATIVE PERCENT: 2 % (ref 1–4)
ERYTHROCYTE [DISTWIDTH] IN BLOOD BY AUTOMATED COUNT: 12.1 % (ref 11.8–14.4)
GFR SERPL CREATININE-BSD FRML MDRD: >60 ML/MIN/1.73M2
GLUCOSE SERPL-MCNC: 99 MG/DL (ref 70–99)
HCT VFR BLD AUTO: 41.2 % (ref 40.7–50.3)
HGB BLD-MCNC: 13.7 G/DL (ref 13–17)
IMM GRANULOCYTES # BLD AUTO: 0.03 K/UL (ref 0–0.3)
IMM GRANULOCYTES NFR BLD: 0 %
LYMPHOCYTES # BLD: 31 % (ref 24–43)
LYMPHOCYTES NFR BLD: 2.3 K/UL (ref 1.1–3.7)
MCH RBC QN AUTO: 30.8 PG (ref 25.2–33.5)
MCHC RBC AUTO-ENTMCNC: 33.3 G/DL (ref 28.4–34.8)
MCV RBC AUTO: 92.6 FL (ref 82.6–102.9)
MONOCYTES NFR BLD: 0.61 K/UL (ref 0.1–1.2)
MONOCYTES NFR BLD: 8 % (ref 3–12)
NEUTROPHILS NFR BLD: 58 % (ref 36–65)
NEUTS SEG NFR BLD: 4.44 K/UL (ref 1.5–8.1)
NRBC AUTOMATED: 0 PER 100 WBC
PLATELET # BLD AUTO: 228 K/UL (ref 138–453)
PMV BLD AUTO: 9.8 FL (ref 8.1–13.5)
POTASSIUM SERPL-SCNC: 4.4 MMOL/L (ref 3.7–5.3)
RBC # BLD AUTO: 4.45 M/UL (ref 4.21–5.77)
SODIUM SERPL-SCNC: 139 MMOL/L (ref 135–144)
URATE SERPL-MCNC: 6 MG/DL (ref 3.4–7)
WBC OTHER # BLD: 7.6 K/UL (ref 3.5–11.3)

## 2023-06-05 PROCEDURE — 3017F COLORECTAL CA SCREEN DOC REV: CPT | Performed by: NURSE PRACTITIONER

## 2023-06-05 PROCEDURE — 3077F SYST BP >= 140 MM HG: CPT | Performed by: NURSE PRACTITIONER

## 2023-06-05 PROCEDURE — 36415 COLL VENOUS BLD VENIPUNCTURE: CPT

## 2023-06-05 PROCEDURE — 1036F TOBACCO NON-USER: CPT | Performed by: NURSE PRACTITIONER

## 2023-06-05 PROCEDURE — 84550 ASSAY OF BLOOD/URIC ACID: CPT

## 2023-06-05 PROCEDURE — 85027 COMPLETE CBC AUTOMATED: CPT

## 2023-06-05 PROCEDURE — G8427 DOCREV CUR MEDS BY ELIG CLIN: HCPCS | Performed by: NURSE PRACTITIONER

## 2023-06-05 PROCEDURE — 73502 X-RAY EXAM HIP UNI 2-3 VIEWS: CPT

## 2023-06-05 PROCEDURE — 3078F DIAST BP <80 MM HG: CPT | Performed by: NURSE PRACTITIONER

## 2023-06-05 PROCEDURE — 73562 X-RAY EXAM OF KNEE 3: CPT

## 2023-06-05 PROCEDURE — 1123F ACP DISCUSS/DSCN MKR DOCD: CPT | Performed by: NURSE PRACTITIONER

## 2023-06-05 PROCEDURE — 80048 BASIC METABOLIC PNL TOTAL CA: CPT

## 2023-06-05 PROCEDURE — 99214 OFFICE O/P EST MOD 30 MIN: CPT | Performed by: NURSE PRACTITIONER

## 2023-06-05 PROCEDURE — G8420 CALC BMI NORM PARAMETERS: HCPCS | Performed by: NURSE PRACTITIONER

## 2023-06-05 PROCEDURE — 86140 C-REACTIVE PROTEIN: CPT

## 2023-06-05 RX ORDER — PREDNISONE 50 MG/1
50 TABLET ORAL DAILY
Qty: 5 TABLET | Refills: 0 | Status: SHIPPED | OUTPATIENT
Start: 2023-06-05 | End: 2023-06-10

## 2023-06-05 RX ORDER — LOSARTAN POTASSIUM 25 MG/1
TABLET ORAL
COMMUNITY
Start: 2023-03-02 | End: 2023-06-05

## 2023-06-05 ASSESSMENT — ENCOUNTER SYMPTOMS
SINUS PRESSURE: 0
CHEST TIGHTNESS: 0
SORE THROAT: 0
NAUSEA: 0
COLOR CHANGE: 0
ABDOMINAL PAIN: 0
PHOTOPHOBIA: 0
SINUS PAIN: 0
COUGH: 0
BACK PAIN: 0
SHORTNESS OF BREATH: 0
DIARRHEA: 0
VOMITING: 0

## 2023-06-05 NOTE — PROGRESS NOTES
Psychiatric/Behavioral:  Negative for behavioral problems. Objective   Physical Exam  Vitals and nursing note reviewed. Constitutional:       General: He is not in acute distress. Appearance: Normal appearance. HENT:      Head: Normocephalic. Right Ear: External ear normal.      Left Ear: External ear normal.      Nose: Nose normal. No congestion or rhinorrhea. Mouth/Throat:      Mouth: Mucous membranes are dry. Eyes:      Conjunctiva/sclera: Conjunctivae normal.      Pupils: Pupils are equal, round, and reactive to light. Cardiovascular:      Rate and Rhythm: Normal rate and regular rhythm. Pulses: Normal pulses. Heart sounds: Normal heart sounds. No murmur heard. Pulmonary:      Effort: Pulmonary effort is normal. No respiratory distress. Breath sounds: Normal breath sounds. No wheezing. Abdominal:      Palpations: Abdomen is soft. Tenderness: There is no abdominal tenderness. Musculoskeletal:         General: Tenderness and signs of injury present. No swelling or deformity. Normal range of motion. Cervical back: Normal range of motion. Skin:     General: Skin is warm and dry. Capillary Refill: Capillary refill takes less than 2 seconds. Neurological:      General: No focal deficit present. Mental Status: He is alert and oriented to person, place, and time. Mental status is at baseline. Motor: No weakness. Gait: Gait normal.   Psychiatric:         Mood and Affect: Mood normal.         Behavior: Behavior normal.         Thought Content: Thought content normal.         Judgment: Judgment normal.                An electronic signature was used to authenticate this note.     --Casie Franklin, CJ - CNP

## 2023-06-06 ENCOUNTER — TELEPHONE (OUTPATIENT)
Dept: PRIMARY CARE CLINIC | Age: 63
End: 2023-06-06

## 2023-06-06 NOTE — TELEPHONE ENCOUNTER
Patient called in asking if he can be prescribed Xanax due to having a hard time with losing his mother. Patient states he was on Xanax two years ago.

## 2023-06-07 NOTE — TELEPHONE ENCOUNTER
Patient states he does not believe he needs counseling at this time. Appt scheduled to discuss medications.

## 2023-06-08 ENCOUNTER — OFFICE VISIT (OUTPATIENT)
Dept: PRIMARY CARE CLINIC | Age: 63
End: 2023-06-08
Payer: MEDICARE

## 2023-06-08 VITALS
BODY MASS INDEX: 23.16 KG/M2 | HEART RATE: 71 BPM | SYSTOLIC BLOOD PRESSURE: 180 MMHG | OXYGEN SATURATION: 97 % | WEIGHT: 139 LBS | HEIGHT: 65 IN | DIASTOLIC BLOOD PRESSURE: 84 MMHG

## 2023-06-08 DIAGNOSIS — F43.22 ADJUSTMENT DISORDER WITH ANXIOUS MOOD: Primary | ICD-10-CM

## 2023-06-08 PROCEDURE — 1036F TOBACCO NON-USER: CPT | Performed by: NURSE PRACTITIONER

## 2023-06-08 PROCEDURE — 1123F ACP DISCUSS/DSCN MKR DOCD: CPT | Performed by: NURSE PRACTITIONER

## 2023-06-08 PROCEDURE — 3079F DIAST BP 80-89 MM HG: CPT | Performed by: NURSE PRACTITIONER

## 2023-06-08 PROCEDURE — 99212 OFFICE O/P EST SF 10 MIN: CPT | Performed by: NURSE PRACTITIONER

## 2023-06-08 PROCEDURE — G8420 CALC BMI NORM PARAMETERS: HCPCS | Performed by: NURSE PRACTITIONER

## 2023-06-08 PROCEDURE — 3077F SYST BP >= 140 MM HG: CPT | Performed by: NURSE PRACTITIONER

## 2023-06-08 PROCEDURE — 3017F COLORECTAL CA SCREEN DOC REV: CPT | Performed by: NURSE PRACTITIONER

## 2023-06-08 PROCEDURE — G8427 DOCREV CUR MEDS BY ELIG CLIN: HCPCS | Performed by: NURSE PRACTITIONER

## 2023-06-08 ASSESSMENT — ENCOUNTER SYMPTOMS
COLOR CHANGE: 0
ABDOMINAL PAIN: 0
BACK PAIN: 0
NAUSEA: 0
CHEST TIGHTNESS: 0
SINUS PRESSURE: 0
COUGH: 0
SINUS PAIN: 0
SORE THROAT: 0
PHOTOPHOBIA: 0
VOMITING: 0
SHORTNESS OF BREATH: 0
DIARRHEA: 0
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
## 2023-07-19 DIAGNOSIS — Z76.0 MEDICATION REFILL: ICD-10-CM

## 2023-07-19 RX ORDER — ATORVASTATIN CALCIUM 40 MG/1
40 TABLET, FILM COATED ORAL DAILY
Qty: 90 TABLET | Refills: 0 | Status: SHIPPED | OUTPATIENT
Start: 2023-07-19

## 2023-07-31 ENCOUNTER — HOSPITAL ENCOUNTER (EMERGENCY)
Age: 63
Discharge: HOME | End: 2023-07-31
Attending: EMERGENCY MEDICINE
Payer: MEDICARE

## 2023-07-31 ENCOUNTER — APPOINTMENT (OUTPATIENT)
Dept: GENERAL RADIOLOGY | Age: 63
End: 2023-07-31
Payer: MEDICARE

## 2023-07-31 VITALS
RESPIRATION RATE: 17 BRPM | DIASTOLIC BLOOD PRESSURE: 71 MMHG | WEIGHT: 145 LBS | OXYGEN SATURATION: 98 % | HEART RATE: 59 BPM | HEIGHT: 65 IN | BODY MASS INDEX: 24.16 KG/M2 | TEMPERATURE: 97.8 F | SYSTOLIC BLOOD PRESSURE: 144 MMHG

## 2023-07-31 DIAGNOSIS — S60.221A CONTUSION OF RIGHT HAND, INITIAL ENCOUNTER: Primary | ICD-10-CM

## 2023-07-31 PROCEDURE — 73130 X-RAY EXAM OF HAND: CPT

## 2023-07-31 PROCEDURE — 99283 EMERGENCY DEPT VISIT LOW MDM: CPT

## 2023-07-31 ASSESSMENT — PAIN DESCRIPTION - ORIENTATION: ORIENTATION: RIGHT

## 2023-07-31 ASSESSMENT — PAIN - FUNCTIONAL ASSESSMENT: PAIN_FUNCTIONAL_ASSESSMENT: 0-10

## 2023-07-31 ASSESSMENT — PAIN DESCRIPTION - LOCATION: LOCATION: HAND

## 2023-07-31 ASSESSMENT — LIFESTYLE VARIABLES
HOW OFTEN DO YOU HAVE A DRINK CONTAINING ALCOHOL: NEVER
HOW MANY STANDARD DRINKS CONTAINING ALCOHOL DO YOU HAVE ON A TYPICAL DAY: PATIENT DOES NOT DRINK

## 2023-07-31 ASSESSMENT — PAIN SCALES - GENERAL: PAINLEVEL_OUTOF10: 9

## 2023-07-31 NOTE — ED PROVIDER NOTES
No edema to his digits, full flexion extension all of his digits right hand, active and passive. Skin:     General: Skin is warm and dry. Capillary Refill: Capillary refill takes less than 2 seconds. Right fingers. Findings: No erythema or rash. Neurological:      General: No focal deficit present. Mental Status: He is alert and oriented to person, place, and time. Coordination: Coordination normal.      Comments: Right hand neuro intact motor and sensory function. Psychiatric:         Mood and Affect: Mood normal.         Behavior: Behavior normal.         Thought Content: Thought content normal.         Judgment: Judgment normal.       MEDICAL DECISION MAKING / ED COURSE:         1)  Number and Complexity of Problems Addressed at this Encounter  Problem List This Visit: Right hand injury 10 days ago. Differential Diagnosis: Contusion, fracture    Diagnoses Considered but Do Not Suspect: Tendon rupture, septic arthritis, infection    Pertinent Comorbid Conditions: Coronary disease on Plavix. 2)  Data Reviewed and Analyzed  (Lab and radiology tests/orders below in next section)  X-rays negative for fracture. No acute fracture seen or dislocation. 3)  Treatment and Disposition       Discussed with the patient anticipatory guidance, discharge fractions, follow-up with his PCP 1 to 2 days. Tylenol, ice for pain relief. Avoiding NSAIDs because of his cardiac history. Do not suspect compartment syndrome or neurovascular injury. DATA FOR LAB AND RADIOLOGY TESTS ORDERED BELOW ARE REVIEWED BY THE ED CLINICIAN:    RADIOLOGY: All x-rays, CT, MRI, and formal ultrasound images (except ED bedside ultrasound) are read by the radiologist, see reports below, unless otherwise noted in MDM or here. Reports below are reviewed by myself.   XR HAND RIGHT (MIN 3 VIEWS)   Final Result   No evidence of acute fracture             Vitals Reviewed:    Vitals:    07/31/23 0838   BP: (!) 144/71   Pulse:
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
## 2023-10-18 DIAGNOSIS — Z76.0 MEDICATION REFILL: ICD-10-CM

## 2023-10-18 RX ORDER — ATORVASTATIN CALCIUM 40 MG/1
40 TABLET, FILM COATED ORAL DAILY
Qty: 90 TABLET | Refills: 0 | Status: SHIPPED | OUTPATIENT
Start: 2023-10-18

## 2023-10-18 NOTE — TELEPHONE ENCOUNTER
Last seen 6/8/23    Next Visit Date:  No future appointments.     Health Maintenance   Topic Date Due    Shingles vaccine (1 of 2) Never done    Annual Wellness Visit (AWV)  Never done    COVID-19 Vaccine (2 - Booster for MoboFree series) 07/06/2021    Lipids  01/12/2023    Pneumococcal 65+ years Vaccine (1 - PCV) Never done    AAA screen  05/11/2023    Flu vaccine (1) 08/01/2023    Depression Monitoring  01/17/2024    Colorectal Cancer Screen  10/15/2024    DTaP/Tdap/Td vaccine (3 - Td or Tdap) 06/27/2032    Hepatitis C screen  Addressed    HIV screen  Addressed    Hepatitis A vaccine  Aged Out    Hepatitis B vaccine  Aged Out    Hib vaccine  Aged Out    Meningococcal (ACWY) vaccine  Aged Out    A1C test (Diabetic or Prediabetic)  Discontinued    Prostate Specific Antigen (PSA) Screening or Monitoring  Discontinued       Hemoglobin A1C (%)   Date Value   02/15/2022 5.5   01/23/2018 5.7   09/22/2014 5.7             ( goal A1C is < 7)   No components found for: \"LABMICR\"  LDL Cholesterol (mg/dL)   Date Value   01/12/2022 74       (goal LDL is <100)   AST (U/L)   Date Value   01/09/2021 17     ALT (U/L)   Date Value   01/09/2021 15     BUN (mg/dL)   Date Value   06/05/2023 13     BP Readings from Last 3 Encounters:   07/31/23 (!) 144/71   06/08/23 (!) 180/84   06/05/23 (!) 141/76          (goal 120/80)    All Future Testing planned in [facility]  Lab Frequency Next Occurrence   VL DUP LOWER EXTREMITY ARTERIES BILATERAL Once 12/05/2022   Lipid Panel Once 01/30/2023   Comprehensive Metabolic Panel Once 42/31/2329   Troponin Once 01/30/2023   Hemoglobin A1C Once 01/30/2023   EKG 12 Lead - Clinic Performed Once 01/30/2023               Patient Active Problem List:     CAD (coronary artery disease) s/p CABGx4, 10 stents     Anxiety     Chest pain with high risk of acute coronary syndrome     Unstable gait     DJD (degenerative joint disease), lumbosacral     Chest wall pain, chronic     Psychophysiological insomnia

## 2023-11-28 ENCOUNTER — OFFICE VISIT (OUTPATIENT)
Age: 63
End: 2023-11-28

## 2023-11-28 VITALS
HEIGHT: 65 IN | DIASTOLIC BLOOD PRESSURE: 63 MMHG | SYSTOLIC BLOOD PRESSURE: 130 MMHG | BODY MASS INDEX: 24.16 KG/M2 | WEIGHT: 145 LBS

## 2023-11-28 DIAGNOSIS — M25.521 RIGHT ELBOW PAIN: Primary | ICD-10-CM

## 2023-11-28 DIAGNOSIS — M77.8 TENDINITIS OF RIGHT TRICEPS: ICD-10-CM

## 2023-11-28 DIAGNOSIS — M65.9 FLEXOR TENOSYNOVITIS OF FINGER: ICD-10-CM

## 2023-11-28 NOTE — PROGRESS NOTES
[place] [address] AND SPORTS MEDICINE  [address] [facility] [address]  [place] [address]  Dept: [phone]  Dept Fax: [phone]    Chief Compliant:  Chief Complaint   Patient presents with    Hand Pain     right        History of Present Illness: This is a pleasant 70 y.o. male who is here for evaluation of 2 issues today. His biggest concern is right middle finger pain. This been present for about a year. The middle finger does occasionally trigger and catch and get stuck in a flexed position. He notes a popping sensation. He has been using ibuprofen and heat and massage. As a secondary issue he also notes pain in the right elbow directly posterior at the triceps tendon insertion and the olecranon bursa. He denies any significant swelling in this area. He states he had prior cortisone injections to the elbow. Physical Exam: The right hand has tenderness to the A1 pulley. He has full range of motion of the middle finger. There is no active triggering in the office today. The right elbow has no significant palpable defect of the triceps tendon. He does have tenderness to the distal triceps tendon and the proximal aspect of the olecranon bursa. There is no tenderness to the lateral condyle. He has 4/5 resisted tricep strength bilaterally. Imaging: X-rays of the right hand taken previously were reviewed and show no significant arthritic changes to the middle finger. X-rays of the right elbow 3 views taken today show a well-preserved elbow joint. There is an enthesophyte at the triceps tendon insertion. Assessment and Plan: This is a pleasant 70 y.o. male who has right middle finger flexor tenosynovitis. After the skin was cleansed with alcohol given injection of 40 mg of Depo-Medrol local anesthetic to the right middle finger A1 pulley.   Cortisone injection risks include

## 2023-11-29 RX ORDER — METHYLPREDNISOLONE ACETATE 40 MG/ML
40 INJECTION, SUSPENSION INTRA-ARTICULAR; INTRALESIONAL; INTRAMUSCULAR; SOFT TISSUE ONCE
Status: COMPLETED | OUTPATIENT
Start: 2023-11-29 | End: 2023-11-29

## 2023-11-29 RX ORDER — LIDOCAINE HYDROCHLORIDE 10 MG/ML
1 INJECTION, SOLUTION INFILTRATION; PERINEURAL ONCE
Status: COMPLETED | OUTPATIENT
Start: 2023-11-29 | End: 2023-11-29

## 2023-11-29 RX ORDER — METHYLPREDNISOLONE ACETATE 80 MG/ML
80 INJECTION, SUSPENSION INTRA-ARTICULAR; INTRALESIONAL; INTRAMUSCULAR; SOFT TISSUE ONCE
Status: COMPLETED | OUTPATIENT
Start: 2023-11-29 | End: 2023-11-29

## 2023-11-29 RX ADMIN — LIDOCAINE HYDROCHLORIDE 1 ML: 10 INJECTION, SOLUTION INFILTRATION; PERINEURAL at 10:08

## 2023-11-29 RX ADMIN — METHYLPREDNISOLONE ACETATE 80 MG: 80 INJECTION, SUSPENSION INTRA-ARTICULAR; INTRALESIONAL; INTRAMUSCULAR; SOFT TISSUE at 10:10

## 2023-11-29 RX ADMIN — LIDOCAINE HYDROCHLORIDE 1 ML: 10 INJECTION, SOLUTION INFILTRATION; PERINEURAL at 10:10

## 2023-11-29 RX ADMIN — METHYLPREDNISOLONE ACETATE 40 MG: 40 INJECTION, SUSPENSION INTRA-ARTICULAR; INTRALESIONAL; INTRAMUSCULAR; SOFT TISSUE at 10:09

## 2023-11-29 NOTE — PROGRESS NOTES
Administrations This Visit       lidocaine 1 % injection 1 mL       Admin Date  11/29/2023  10:08 Action  Given Dose  1 mL Route  Intra-artICUlar Site  Hand Right Administered By  Laila Nelson MA    Ordering Provider: Minna Peñaloza MD    NDC: 50772-690-28    : [address]    Patient Supplied?: No               Admin Date  11/29/2023  10:10 Action  Given Dose  1 mL Route  Intra-artICUlar Site  Elbow Right Administered By  Laila Nelson MA    Ordering Provider: Minna Peñaloza MD    [address]: 95868-571-50    : [address]    Patient Supplied?: No              methylPREDNISolone acetate (DEPO-MEDROL) injection 40 mg       Admin Date  11/29/2023  10:09 Action  Given Dose  40 mg Route  Intra-artICUlar Site  Hand Right Administered By  Laila Nelson MA    Ordering Provider: Minna Peñaloza MD    NDC: 0950-9453-13    : [address]. Patient Supplied?: No              methylPREDNISolone acetate (DEPO-MEDROL) injection 80 mg       Admin Date  11/29/2023  10:10 Action  Given Dose  80 mg Route  Intra-artICUlar Site  Elbow Right Administered By  Laila Nelson MA    Ordering Provider: Minna Peñaloza MD    NDC: 1839-0902-16    : [address]. Patient Supplied?: No                    Medication was administered by provider, Dr Janet Willett. No adverse reactions.     Laila Nelson MA.

## 2024-01-15 NOTE — THERAPY DISCHARGE
Massage   32815      [] Dry Needling, 1 or 2 muscles  37195   [] Biofeedback, first 15 minutes   00053  [] Biofeedback, additional 15 minutes   72340 [] Dry Needling, 3 or more muscles  20561                If you have any questions or concerns regarding this patient's care, please contact us.   Thank you for your referral.      Electronically signed by: Vincenzo Verma PT

## 2024-01-31 DIAGNOSIS — Z76.0 MEDICATION REFILL: ICD-10-CM

## 2024-01-31 RX ORDER — ATORVASTATIN CALCIUM 40 MG/1
40 TABLET, FILM COATED ORAL DAILY
Qty: 90 TABLET | Refills: 0 | Status: SHIPPED | OUTPATIENT
Start: 2024-01-31

## 2024-01-31 NOTE — TELEPHONE ENCOUNTER
LOV 6/8/23   Physical Therapy  Visit Type: initial evaluation  Treatment diagnosis: Weakness  Precautions:    Spine:     Lumbar: lumbar brace on when out of bed, no bending, no twisting, no lifting greater than 10 # and LSO (Per RN, MD states pt can get up with abdominal binder until brace delivered. )      SUBJECTIVE                                                                                                            Patient agreed to participate in therapy this date.  Patient states he is ready to get moving. Patient has not been hospitalized, in a skilled nursing facility, or seen by home health in the last 30 days.  Patient / Family Goal: return to previous functional status and return home      OBJECTIVE                                                                                                              Level of consciousness: alert    Oriented to person, place, time and situation     Arousal alertness: appropriate responses to stimuli  Patient activity tolerance: 1 to 1 activity to rest  Range of Motion (measured in degrees unless otherwise noted, active unless indicated)  WFL: RLE, LLE  Strength (out of 5 unless otherwise indicated)   WFL: LLE, RLE  Bed Mobility:      Rolling right: modified independent    Supine to sit: modified independent    Sit to supine: modified independent  Training completed:    Tasks: supine to sit, sit to supine and rolling right    Education details: patient demonstrates understanding    Patient able to demonstrate log roll technique after initial education.   Transfers:      Sit to stand: modified independent    Stand to sit: modified independent  Training completed:    Tasks: sit to stand and stand to sit    Education details: patient demonstrates understanding    Requires extra amount of time for pain management, but no device needed.   Gait/Ambulation:     Assistance: modified independent    Distance (ft): 90    Type: decreased oliva  Training Completed:      Education  details: patient demonstrates understanding    No LOB and no assistive device needed. Slightly decrease gait oliva for pain management. Able to turn without twisting at back.   Stair Mobility:    Number of steps: 6;     Assistance: supervision and modified independent    Rails: left rail only    Pattern: step to  Training completed:    Tasks: stair training    Education details: patient demonstrates understanding    Initially supervision, but progresses to mod indep. Patient states no concerns, and no concerns for full flight of stairs.         Interventions                                                                                                       Additional exercise details: Issued and educated patient on supine/seated HEP. Patient able to demonstrate 1 rep of each in supine of: abdominal bracing, ankle pumps, quad sets, heel slides, and bent knee fall out. Patient verbalizes understanding for seated HEP also.         ASSESSMENT                                                                                                                  Patient seen on Day Surgery nursing unit. He presents near baseline  which was independent  with  household mobility and community mobility. Currently lives with spouse in a(n) apartment.  For safe return to prior living situation the patient needs to be modified independent  for overall mobility.    POD: 0  PROCEDURE: LEFT L5S1 MICRO LUMBAR DISCECTOMY  PRECAUTIONS: lumbar precautions, back brace on when out of bed    Physical Therapy evaluation session today focused on education on lumbar precautions, education and issuing of HEP (patient able to demonstrate supine HEP), bed mobility with log roll technique, transfers, gait training, and stair training. Patient's back brace still not delivered; RN and working on, but states Dr. Garg confirmed patient can get up with therapy if wearing abdominal brace at this time. Patient educated on wearing back brace when out  of bed once brace delivered. Patient able to ambulate with no LOB and no need for assistive device, but requires extra amount of time for safety/pain management. Patient states no further questions and is planning on being d/c home from day surgery unit.          Discharge Recommendations  Recommendation for Discharge: PT WI: Home             PT/OT Mobility Equipment for Discharge: No needs      PT Identified Barriers to Discharge: None noted   Skilled therapy is not required due to patient being modified independent and states no further questions.   Predicted patient presentation: Low (stable) - Patient comorbidities and complexities, as defined above, will have little effect on progress for prescribed plan of care.      End of Session:   Location: in bed  Safety measures: call light within reach and lines intact  Handoff to: nurse            PLAN                                                                                                                            Suggestions for next session as indicated: D/c              Interventions: body mechanics, gait training, bed mobility, HEP train/position, safety education, functional transfer training and stairs retraining  Agreement to plan and goals: patient agrees with goals and treatment plan        Goals Reviewed: 8/31/2020  GOALS:  Long Term Goals: (to be met by time of discharge from hospital)  Sit to supine: Patient will complete sit to supine modified independent.  Status: met   Supine to sit: Patient will complete supine to sit modified independent.  Status: met   Sit to stand: Patient will complete sit to stand transfer with no device, modified independent.   Status: met   Stand to sit: Patient will complete stand to sit transfer with no device, modified independent.   Status: met   Ambulation (even): Patient will ambulate on even surface for 100 feet with modified independent.  Status: met  (90', but no concerns for 100')  Flight of stairs: Patient  will ambulate a flight of stairs with using 1 rail, modified independent.  Status: met  (6 stairs, but no concerns for full flight of stairs. )  Home program: patient independent with home program as instructed.   Status: met  (partially demonstrates, and verbalizes understanding for all. Written handout given. Pt states no further questions. )      Documented in the chart in the following areas: Prior Level of Function. Pain. Assessment. Patient Education.      Referring Provider: Darshan Garg MD  Admitting diagnosis: Lumbar disc herniation (M51.26)    Recorded hospital problem list:  There are no active hospital problems to display for this patient.  Additional Co-morbidities:   Patient Active Problem List:   Contusion of knee   Backache, unspecified   Acute idiopathic gout   Chronic midline low back pain without sciatica   Lumbar disc herniation    Treatment Diagnosis: Weakness    The referring provider's electronic signature on the evaluation authorizes the therapy plan of care and certifies the need for these services, furnished under this plan of care while under their care.

## 2024-02-06 ENCOUNTER — OFFICE VISIT (OUTPATIENT)
Age: 64
End: 2024-02-06
Payer: MEDICARE

## 2024-02-06 VITALS — BODY MASS INDEX: 24.16 KG/M2 | WEIGHT: 145 LBS | HEIGHT: 65 IN

## 2024-02-06 DIAGNOSIS — M65.9 FLEXOR TENOSYNOVITIS OF FINGER: Primary | ICD-10-CM

## 2024-02-06 PROCEDURE — G8484 FLU IMMUNIZE NO ADMIN: HCPCS | Performed by: ORTHOPAEDIC SURGERY

## 2024-02-06 PROCEDURE — G8427 DOCREV CUR MEDS BY ELIG CLIN: HCPCS | Performed by: ORTHOPAEDIC SURGERY

## 2024-02-06 PROCEDURE — 3017F COLORECTAL CA SCREEN DOC REV: CPT | Performed by: ORTHOPAEDIC SURGERY

## 2024-02-06 PROCEDURE — 1036F TOBACCO NON-USER: CPT | Performed by: ORTHOPAEDIC SURGERY

## 2024-02-06 PROCEDURE — G8420 CALC BMI NORM PARAMETERS: HCPCS | Performed by: ORTHOPAEDIC SURGERY

## 2024-02-06 PROCEDURE — 1123F ACP DISCUSS/DSCN MKR DOCD: CPT | Performed by: ORTHOPAEDIC SURGERY

## 2024-02-06 PROCEDURE — 99214 OFFICE O/P EST MOD 30 MIN: CPT | Performed by: ORTHOPAEDIC SURGERY

## 2024-02-06 NOTE — PROGRESS NOTES
[facility]      [facility] PHYSICIANS [facility]  [facility] ORTHOPAEDICS AND SPORTS MEDICINE  [address]  [place] [address]  Dept: [phone]  Dept Fax: [phone]    Chief Compliant:  Chief Complaint   Patient presents with    Hand Pain     Right long finger        History of Present Illness:  This is a pleasant 63 y.o. male who is here for reevaluation of his chronic right middle finger pain that is worsening.  I saw him in late November and diagnosed with a right middle finger flexor tenosynovitis.  I gave him a cortisone injection to the A1 pulley.  This completely alleviated his symptoms until recently.  Now he is having painful triggering again.    Physical Exam: The right middle finger has tenderness to the A1 pulley.  There is clicking with range of motion in the office today.    Imaging: None      Assessment and Plan:    This is a pleasant 63 y.o. male who has a right middle finger flexor tenosynovitis.  We again discussed options.  I recommend a right middle finger A1 pulley release surgery.  Risks benefits and alternatives of A1 pulley release surgery was discussed with the patient.  Risks include but are not limited to incomplete relief of symptoms, injury to surrounding neurovascular structures, infection.  We will do this under local anesthetic         Past History:    Current Outpatient Medications:     atorvastatin (LIPITOR) 40 MG tablet, TAKE 1 TABLET BY MOUTH DAILY, Disp: 90 tablet, Rfl: 0    aspirin (ASPIRIN CHILDRENS) 81 MG chewable tablet, Take 1 tablet by mouth daily, Disp: 30 tablet, Rfl: 3    clopidogrel (PLAVIX) 75 MG tablet, Take 1 tablet by mouth daily, Disp: 90 tablet, Rfl: 1    nitroGLYCERIN (NITROSTAT) 0.4 MG SL tablet, Place 1 tablet under the tongue every 5 minutes as needed for Chest pain, Disp: 25 tablet, Rfl: 1    acetaminophen (TYLENOL) 500 MG tablet, Take 2 tablets by mouth every 6 hours as needed for

## 2024-02-19 NOTE — PROGRESS NOTES
PAT phone call for local completed with pt.    Date/time/location (entrance C) of surgery/procedure verified with pt.    Instructed pt to take a shower the AM of surgery/procedure and to avoid lotions, creams, jewelry.    Instructed pt to call dr malave regarding plavix and aspirin

## 2024-02-21 ENCOUNTER — HOSPITAL ENCOUNTER (OUTPATIENT)
Age: 64
Setting detail: OUTPATIENT SURGERY
Discharge: HOME | End: 2024-02-21
Attending: ORTHOPAEDIC SURGERY | Admitting: ORTHOPAEDIC SURGERY
Payer: MEDICARE

## 2024-02-21 VITALS
HEART RATE: 51 BPM | WEIGHT: 142.2 LBS | HEIGHT: 65 IN | SYSTOLIC BLOOD PRESSURE: 140 MMHG | DIASTOLIC BLOOD PRESSURE: 54 MMHG | OXYGEN SATURATION: 99 % | BODY MASS INDEX: 23.69 KG/M2 | TEMPERATURE: 97.1 F | RESPIRATION RATE: 11 BRPM

## 2024-02-21 PROCEDURE — 7100000011 HC PHASE II RECOVERY - ADDTL 15 MIN: Performed by: ORTHOPAEDIC SURGERY

## 2024-02-21 PROCEDURE — 3600000012 HC SURGERY LEVEL 2 ADDTL 15MIN: Performed by: ORTHOPAEDIC SURGERY

## 2024-02-21 PROCEDURE — 3600000002 HC SURGERY LEVEL 2 BASE: Performed by: ORTHOPAEDIC SURGERY

## 2024-02-21 PROCEDURE — 2709999900 HC NON-CHARGEABLE SUPPLY: Performed by: ORTHOPAEDIC SURGERY

## 2024-02-21 PROCEDURE — 7100000010 HC PHASE II RECOVERY - FIRST 15 MIN: Performed by: ORTHOPAEDIC SURGERY

## 2024-02-21 PROCEDURE — 6360000002 HC RX W HCPCS: Performed by: ORTHOPAEDIC SURGERY

## 2024-02-21 PROCEDURE — 26055 INCISE FINGER TENDON SHEATH: CPT | Performed by: ORTHOPAEDIC SURGERY

## 2024-02-21 RX ORDER — LIDOCAINE 40 MG/G
CREAM TOPICAL
Status: DISCONTINUED
Start: 2024-02-21 | End: 2024-02-21 | Stop reason: WASHOUT

## 2024-02-21 RX ORDER — IBUPROFEN 800 MG/1
800 TABLET ORAL
Qty: 30 TABLET | Refills: 0 | Status: SHIPPED | OUTPATIENT
Start: 2024-02-21 | End: 2024-03-02

## 2024-02-21 RX ORDER — ROPIVACAINE HYDROCHLORIDE 5 MG/ML
INJECTION, SOLUTION EPIDURAL; INFILTRATION; PERINEURAL PRN
Status: DISCONTINUED | OUTPATIENT
Start: 2024-02-21 | End: 2024-02-21 | Stop reason: ALTCHOICE

## 2024-02-21 RX ORDER — ACETAMINOPHEN 500 MG
1000 TABLET ORAL EVERY 6 HOURS PRN
Qty: 30 TABLET | Refills: 0 | Status: SHIPPED | OUTPATIENT
Start: 2024-02-21

## 2024-02-21 RX ORDER — LIDOCAINE 40 MG/G
CREAM TOPICAL ONCE
Status: DISCONTINUED | OUTPATIENT
Start: 2024-02-21 | End: 2024-02-21 | Stop reason: HOSPADM

## 2024-02-21 RX ORDER — ROPIVACAINE HYDROCHLORIDE 5 MG/ML
INJECTION, SOLUTION EPIDURAL; INFILTRATION; PERINEURAL
Status: DISCONTINUED
Start: 2024-02-21 | End: 2024-02-21 | Stop reason: HOSPADM

## 2024-02-21 ASSESSMENT — PAIN - FUNCTIONAL ASSESSMENT
PAIN_FUNCTIONAL_ASSESSMENT: NONE - DENIES PAIN
PAIN_FUNCTIONAL_ASSESSMENT: PREVENTS OR INTERFERES SOME ACTIVE ACTIVITIES AND ADLS
PAIN_FUNCTIONAL_ASSESSMENT: 0-10

## 2024-02-21 ASSESSMENT — PAIN DESCRIPTION - DESCRIPTORS: DESCRIPTORS: THROBBING;OTHER (COMMENT)

## 2024-02-21 NOTE — OP NOTE
Operative Note      Patient: Shilo Alcantara  YOB: 1960  MRN: [id number]    Date of Procedure: 2/21/2024    Pre-Op Diagnosis Codes:     * Trigger finger, right middle finger [M65.331]    Post-Op Diagnosis: Same       Procedure(s):  RIGHT MIDDLE FINGER A1-PULLEY TRIGGER RELEASE    Surgeon(s):  Alec King MD    Assistant:   * No surgical staff found *    Anesthesia: Local    Estimated Blood Loss (mL): Minimal    Complications: None    Specimens:   * No specimens in log *    Implants:  * No implants in log *      Drains: * No LDAs found *    Findings: See below        Detailed Description of Procedure:   Informed consent was obtained.  I marked the right middle finger.  The patient was brought back to the operating room.  I anesthetized the surgical site with half percent ropivacaine plain. The appropriate timeout was performed.  No antibiotics were given as none were indicated.  The hand was prepped and draped in normal sterile fashion.  I made an incision over the right middle finger A1 pulley.  I bluntly dissected through the subcutaneous tissue.  I used a knife and scissors to release the A1 pulley proximally and distally.  I protected the neurovascular bundles.  I pulled the flexor tendon out of the wound and there were no adhesions.  I had the patient actively flex and extend the finger and there were no adhesions.  The skin was closed with Monocryl and skin glue.  A sterile dry dressing was applied.  Sponge and needle counts were correct.    Postoperative plan: Gentle use of the hand and fingers is okay immediately.  Avoid heavy .  Tylenol and ibuprofen for pain control.  Shower in 2 days and then daily Band-Aid change.  I will see them in 2 weeks.     Ann Newton CNP was a first assistant for this case and was directly involved with each stage of the procedure including positioning, assistance during the procedure, implant placement if needed, and closure if needed.  There was no  orthopedic resident available to assist.      Electronically signed by Alec King MD on 2/21/2024 at 7:38 AM

## 2024-02-21 NOTE — PROGRESS NOTES
CLINICAL PHARMACY NOTE: MEDS TO BEDS    Total # of Prescriptions Filled: 2   The following medications were delivered to the patient:  Tylenol 500mg  Ibuprofen 800mg    Additional Documentation:

## 2024-02-21 NOTE — H&P
ORTHOPEDIC PREOP HISTORY AND PHYSICAL      HPI / Chief Complaint  Shilo Alcantara is a 63 y.o. male who presents for right middle finger pain    Past Medical History  Shilo  has a past medical history of Anxiety, Atherosclerosis of autologous vein coronary artery bypass graft, CAD (coronary artery disease), Chronic back pain, Congenital heart disease, Depression, Headache(784.0), Herpes zoster keratoconjunctivitis, History of colon polyps, Hx of heart artery stent, Hyperlipidemia, Hypertension, LONG TERM ANTICOAGULENT USE, MI (myocardial infarction) (HCC), Opioid withdrawal (HCC), Osteoarthritis, Radicular syndrome of left leg, Restless legs syndrome, S/P CABG x 4, and Shingles outbreak.    Past Surgical History  Shilo  has a past surgical history that includes Appendectomy; Coronary artery bypass graft (2008, 2009); Upper gastrointestinal endoscopy; Cardiac catheterization; Cardiac catheterization; and Colonoscopy (N/A, 10/15/2019).    Current Medications  Current Facility-Administered Medications   Medication Dose Route Frequency Provider Last Rate Last Admin    lidocaine (LMX) 4 % cream   Topical Once Truss, Ann, APRN - CNP        lidocaine (LMX) 4 % cream             ROPivacaine (NAROPIN) 0.5% injection                Allergies  Allergies have been reviewed.  Shilo has No Known Allergies.    Social History  Shilo  reports that he quit smoking about 24 years ago. His smoking use included cigarettes. He started smoking about 44 years ago. He has a 20.0 pack-year smoking history. He has never used smokeless tobacco. He reports that he does not drink alcohol and does not use drugs.    Family History  Shilo's family history includes Cancer in his father; Diabetes in his mother; Heart Disease in his father; Hypertension in his mother.      Review of Systems   History obtained from the patient.   REVIEW OF SYSTEMS:   Constitution: negative for fever, chills    Physical Exam  BP (!) 157/67   Pulse 54   Temp  96.8 °F (36 °C) (Infrared)   Resp 10   Ht 1.651 m (5' 5\")   Wt 64.5 kg (142 lb 3.2 oz)   SpO2 98%   BMI 23.66 kg/m²    General Appearance: in no distress  HEENT: Normocephalic  CV: brisk cap refill to extremities  Lungs: Nonlabored breathing  Abdomen: soft  Extremities: right middle finger skin intact    Assessment and Plan  Shilo Alcantara is a 63 y.o. old male with right middle finger trigger finger.  Plan for right middle finger A1 pulley release    This note is created with the assistance of a speech recognition program.  While intending to generate a document that actually reflects the content of the visit, the document can still have some errors including those of syntax and sound a like substitutions which may escape proof reading.  It such instances, actual meaning can be extrapolated by contextual diversion.

## 2024-02-21 NOTE — DISCHARGE INSTRUCTIONS
Leave the surgical bandage in place for 2 days.  Then you can remove it and shower and replace with a new bandage or a large Band-Aid each day.  It is okay and encouraged to move the fingers throughout the day.  It is okay to use that hand for activities of daily living.  Avoid heavy gripping or lifting until seen in the office.    Call your doctor now or seek immediate medical care if:      You have pain that does not get better after you take pain medicine.    You have a fever over 101°F.    You have chills    You have signs of infection, such as:   Increased pain, swelling, warmth, or redness.   Red streaks leading from the incision.   Pus draining from the incision.

## 2024-02-29 ENCOUNTER — OFFICE VISIT (OUTPATIENT)
Age: 64
End: 2024-02-29

## 2024-02-29 VITALS — WEIGHT: 142 LBS | HEIGHT: 65 IN | BODY MASS INDEX: 23.66 KG/M2

## 2024-02-29 DIAGNOSIS — M77.11 LATERAL EPICONDYLITIS OF RIGHT ELBOW: Primary | ICD-10-CM

## 2024-02-29 DIAGNOSIS — M65.9 FLEXOR TENOSYNOVITIS OF FINGER: ICD-10-CM

## 2024-02-29 RX ADMIN — LIDOCAINE HYDROCHLORIDE 1 ML: 10 INJECTION, SOLUTION INFILTRATION; PERINEURAL at 14:06

## 2024-02-29 RX ADMIN — METHYLPREDNISOLONE ACETATE 40 MG: 40 INJECTION, SUSPENSION INTRA-ARTICULAR; INTRALESIONAL; INTRAMUSCULAR; SOFT TISSUE at 14:06

## 2024-02-29 NOTE — PROGRESS NOTES
[facility] Orthopedics & Sports Medicine      [facility] PHYSICIANS [facility]  [facility] ORTHOPAEDICS [facility] SPORTS MEDICINE  [address]  [place] [address]  Dept: [phone]  Dept Fax: [phone]    Chief Compliant:  Chief Complaint   Patient presents with    Elbow Pain     Right elbow          History of Present Illness:  This is a pleasant 63 y.o. male who is here for reevaluation of right elbow pain.  He is also here because he is about a week status post right middle finger A1 pulley release surgery.  He states that his middle finger is doing well with some stiffness.  The elbow pain is more chronic.  I saw him a number of months ago and felt that he had some olecranon bursitis.  I gave him a cortisone injection to the olecranon bursa.  This did help.  Now his pain is more focal over the lateral epicondyle.    Physical Exam: The right elbow has tenderness mostly over the posterior lateral aspect around the posterior aspect of the lateral condyle.  He has pain with resisted wrist extension.  There is no swelling in the olecranon bursa.    Imaging: None      Assessment and Plan:    This is a pleasant 63 y.o. male who has right elbow lateral epicondylitis.  Verbal consent was obtained. After the skin was cleansed alcohol and injection of 40 mg of Depo-Medrol and local anesthetic was given to the right elbow lateral epicondyle.  I fenestrated the ECRB origin.  Cortisone injection risks include infection, postinjection pain, hyperglycemia.    In regards to his right middle finger A1 pulley release he is doing well.  We discussed long-term expectations.  He can increase his activity as tolerated and follow-up as needed         Past History:    Current Outpatient Medications:     acetaminophen (TYLENOL) 500 MG tablet, Take 2 tablets by mouth every 6 hours as needed for Pain, Disp: 30 tablet, Rfl: 0    ibuprofen (ADVIL;MOTRIN) 800 MG tablet, Take 1 tablet by mouth 3 times

## 2024-03-01 RX ORDER — METHYLPREDNISOLONE ACETATE 40 MG/ML
40 INJECTION, SUSPENSION INTRA-ARTICULAR; INTRALESIONAL; INTRAMUSCULAR; SOFT TISSUE ONCE
Status: COMPLETED | OUTPATIENT
Start: 2024-03-01 | End: 2024-02-29

## 2024-03-01 RX ORDER — LIDOCAINE HYDROCHLORIDE 10 MG/ML
1 INJECTION, SOLUTION INFILTRATION; PERINEURAL ONCE
Status: COMPLETED | OUTPATIENT
Start: 2024-03-01 | End: 2024-02-29

## 2024-04-30 DIAGNOSIS — Z76.0 MEDICATION REFILL: ICD-10-CM

## 2024-04-30 RX ORDER — ATORVASTATIN CALCIUM 40 MG/1
40 TABLET, FILM COATED ORAL DAILY
Qty: 90 TABLET | Refills: 0 | OUTPATIENT
Start: 2024-04-30

## 2024-05-15 ENCOUNTER — HOSPITAL ENCOUNTER (EMERGENCY)
Age: 64
Discharge: LEFT BEFORE DISCHARGE | End: 2024-05-15
Attending: EMERGENCY MEDICINE

## 2024-05-15 VITALS
HEIGHT: 65 IN | OXYGEN SATURATION: 97 % | HEART RATE: 67 BPM | WEIGHT: 145 LBS | RESPIRATION RATE: 18 BRPM | SYSTOLIC BLOOD PRESSURE: 152 MMHG | DIASTOLIC BLOOD PRESSURE: 97 MMHG | TEMPERATURE: 97.8 F | BODY MASS INDEX: 24.16 KG/M2

## 2024-05-15 DIAGNOSIS — Z53.21 PATIENT LEFT WITHOUT BEING SEEN: Primary | ICD-10-CM

## 2024-05-15 ASSESSMENT — LIFESTYLE VARIABLES
HOW MANY STANDARD DRINKS CONTAINING ALCOHOL DO YOU HAVE ON A TYPICAL DAY: PATIENT DOES NOT DRINK
HOW OFTEN DO YOU HAVE A DRINK CONTAINING ALCOHOL: NEVER

## 2024-05-15 ASSESSMENT — PAIN - FUNCTIONAL ASSESSMENT: PAIN_FUNCTIONAL_ASSESSMENT: 0-10

## 2024-05-15 ASSESSMENT — PAIN DESCRIPTION - ORIENTATION: ORIENTATION: RIGHT

## 2024-05-15 ASSESSMENT — PAIN DESCRIPTION - LOCATION: LOCATION: ANKLE;LEG

## 2024-05-15 ASSESSMENT — PAIN DESCRIPTION - FREQUENCY: FREQUENCY: INTERMITTENT

## 2024-05-15 ASSESSMENT — PAIN SCALES - GENERAL: PAINLEVEL_OUTOF10: 9

## 2024-05-15 ASSESSMENT — PAIN DESCRIPTION - PAIN TYPE: TYPE: ACUTE PAIN

## 2024-05-16 NOTE — ED PROVIDER NOTES
I initially signed up for this patient, I did not see or evaluate this patient, patient left without being seen     Montana Medina DO  05/16/24 0834

## 2024-05-20 DIAGNOSIS — Z76.0 MEDICATION REFILL: ICD-10-CM

## 2024-05-21 RX ORDER — ATORVASTATIN CALCIUM 40 MG/1
40 TABLET, FILM COATED ORAL DAILY
Qty: 90 TABLET | Refills: 0 | OUTPATIENT
Start: 2024-05-21

## 2024-05-23 DIAGNOSIS — Z76.0 MEDICATION REFILL: ICD-10-CM

## 2024-05-23 RX ORDER — ATORVASTATIN CALCIUM 40 MG/1
40 TABLET, FILM COATED ORAL DAILY
Qty: 90 TABLET | Refills: 0 | OUTPATIENT
Start: 2024-05-23

## 2024-09-10 ENCOUNTER — HOSPITAL ENCOUNTER (OUTPATIENT)
Age: 64
Discharge: HOME | End: 2024-09-10
Payer: MEDICARE

## 2024-09-10 LAB
ANION GAP SERPL CALCULATED.3IONS-SCNC: 7 MMOL/L (ref 9–17)
BUN SERPL-MCNC: 17 MG/DL (ref 8–23)
CALCIUM SERPL-MCNC: 9.5 MG/DL (ref 8.6–10.4)
CHLORIDE SERPL-SCNC: 102 MMOL/L (ref 98–107)
CHOLEST SERPL-MCNC: 195 MG/DL
CHOLESTEROL/HDL RATIO: 4
CO2 SERPL-SCNC: 29 MMOL/L (ref 20–31)
CREAT SERPL-MCNC: 1.2 MG/DL (ref 0.7–1.2)
GFR, ESTIMATED: 67 ML/MIN/1.73M2
GLUCOSE SERPL-MCNC: 107 MG/DL (ref 70–99)
HDLC SERPL-MCNC: 49 MG/DL
LDLC SERPL CALC-MCNC: 118 MG/DL (ref 0–130)
POTASSIUM SERPL-SCNC: 4.2 MMOL/L (ref 3.7–5.3)
SODIUM SERPL-SCNC: 138 MMOL/L (ref 135–144)
TRIGL SERPL-MCNC: 138 MG/DL

## 2024-09-10 PROCEDURE — 80061 LIPID PANEL: CPT

## 2024-09-10 PROCEDURE — 36415 COLL VENOUS BLD VENIPUNCTURE: CPT

## 2024-09-10 PROCEDURE — 80048 BASIC METABOLIC PNL TOTAL CA: CPT

## 2024-10-29 NOTE — TELEPHONE ENCOUNTER
Medication helped with pain and he would like to go to [facility] within normal limits

## 2024-12-26 ENCOUNTER — HOSPITAL ENCOUNTER (OUTPATIENT)
Dept: GENERAL RADIOLOGY | Age: 64
Discharge: HOME | End: 2024-12-28
Payer: MEDICARE

## 2024-12-26 ENCOUNTER — HOSPITAL ENCOUNTER (OUTPATIENT)
Age: 64
Discharge: HOME | End: 2024-12-28
Payer: MEDICARE

## 2024-12-26 DIAGNOSIS — M25.552 LEFT HIP PAIN: ICD-10-CM

## 2024-12-26 DIAGNOSIS — M25.562 LEFT KNEE PAIN, UNSPECIFIED CHRONICITY: ICD-10-CM

## 2024-12-26 PROCEDURE — 73562 X-RAY EXAM OF KNEE 3: CPT

## 2024-12-26 PROCEDURE — 73502 X-RAY EXAM HIP UNI 2-3 VIEWS: CPT

## 2025-02-26 ENCOUNTER — APPOINTMENT (OUTPATIENT)
Dept: GENERAL RADIOLOGY | Age: 65
End: 2025-02-26
Payer: MEDICARE

## 2025-02-26 ENCOUNTER — HOSPITAL ENCOUNTER (EMERGENCY)
Age: 65
Discharge: HOME | End: 2025-02-26
Attending: EMERGENCY MEDICINE
Payer: MEDICARE

## 2025-02-26 VITALS
DIASTOLIC BLOOD PRESSURE: 98 MMHG | HEIGHT: 65 IN | BODY MASS INDEX: 24.99 KG/M2 | SYSTOLIC BLOOD PRESSURE: 144 MMHG | HEART RATE: 56 BPM | WEIGHT: 150 LBS | TEMPERATURE: 98.1 F | RESPIRATION RATE: 12 BRPM | OXYGEN SATURATION: 99 %

## 2025-02-26 DIAGNOSIS — R42 LIGHTHEADEDNESS: Primary | ICD-10-CM

## 2025-02-26 DIAGNOSIS — R07.9 CHEST PAIN, UNSPECIFIED TYPE: ICD-10-CM

## 2025-02-26 LAB
ANION GAP SERPL CALCULATED.3IONS-SCNC: 11 MMOL/L (ref 9–16)
BASOPHILS # BLD: 0.06 K/UL (ref 0–0.2)
BASOPHILS NFR BLD: 1 % (ref 0–2)
BUN SERPL-MCNC: 14 MG/DL (ref 8–23)
CALCIUM SERPL-MCNC: 9.3 MG/DL (ref 8.8–10.2)
CHLORIDE SERPL-SCNC: 106 MMOL/L (ref 98–107)
CO2 SERPL-SCNC: 22 MMOL/L (ref 20–31)
CREAT SERPL-MCNC: 1.1 MG/DL (ref 0.7–1.2)
EKG ATRIAL RATE: 56 BPM
EKG P AXIS: 76 DEGREES
EKG P-R INTERVAL: 116 MS
EKG Q-T INTERVAL: 418 MS
EKG QRS DURATION: 104 MS
EKG QTC CALCULATION (BAZETT): 403 MS
EKG R AXIS: 48 DEGREES
EKG T AXIS: 69 DEGREES
EKG VENTRICULAR RATE: 56 BPM
EOSINOPHIL # BLD: 0.16 K/UL (ref 0–0.44)
EOSINOPHILS RELATIVE PERCENT: 2 % (ref 1–4)
ERYTHROCYTE [DISTWIDTH] IN BLOOD BY AUTOMATED COUNT: 12 % (ref 11.8–14.4)
GFR, ESTIMATED: 78 ML/MIN/1.73M2
GLUCOSE SERPL-MCNC: 98 MG/DL (ref 82–115)
HCT VFR BLD AUTO: 40.2 % (ref 40.7–50.3)
HGB BLD-MCNC: 13.6 G/DL (ref 13–17)
IMM GRANULOCYTES # BLD AUTO: 0.02 K/UL (ref 0–0.3)
IMM GRANULOCYTES NFR BLD: 0 %
LYMPHOCYTES NFR BLD: 3.27 K/UL (ref 1.1–3.7)
LYMPHOCYTES RELATIVE PERCENT: 41 % (ref 24–43)
MCH RBC QN AUTO: 30.9 PG (ref 25.2–33.5)
MCHC RBC AUTO-ENTMCNC: 33.8 G/DL (ref 28.4–34.8)
MCV RBC AUTO: 91.4 FL (ref 82.6–102.9)
MONOCYTES NFR BLD: 0.73 K/UL (ref 0.1–1.2)
MONOCYTES NFR BLD: 9 % (ref 3–12)
NEUTROPHILS NFR BLD: 47 % (ref 36–65)
NEUTS SEG NFR BLD: 3.81 K/UL (ref 1.5–8.1)
NRBC BLD-RTO: 0 PER 100 WBC
PLATELET # BLD AUTO: 223 K/UL (ref 138–453)
PMV BLD AUTO: 10.3 FL (ref 8.1–13.5)
POTASSIUM SERPL-SCNC: 4.2 MMOL/L (ref 3.7–5.3)
RBC # BLD AUTO: 4.4 M/UL (ref 4.21–5.77)
SODIUM SERPL-SCNC: 140 MMOL/L (ref 136–145)
TROPONIN I SERPL HS-MCNC: 7 NG/L (ref 0–22)
TROPONIN I SERPL HS-MCNC: <6 NG/L (ref 0–22)
WBC OTHER # BLD: 8.1 K/UL (ref 3.5–11.3)

## 2025-02-26 PROCEDURE — 85025 COMPLETE CBC W/AUTO DIFF WBC: CPT

## 2025-02-26 PROCEDURE — 80048 BASIC METABOLIC PNL TOTAL CA: CPT

## 2025-02-26 PROCEDURE — 2580000003 HC RX 258: Performed by: EMERGENCY MEDICINE

## 2025-02-26 PROCEDURE — 71045 X-RAY EXAM CHEST 1 VIEW: CPT

## 2025-02-26 PROCEDURE — 84484 ASSAY OF TROPONIN QUANT: CPT

## 2025-02-26 PROCEDURE — 93005 ELECTROCARDIOGRAM TRACING: CPT | Performed by: EMERGENCY MEDICINE

## 2025-02-26 PROCEDURE — 99285 EMERGENCY DEPT VISIT HI MDM: CPT

## 2025-02-26 RX ORDER — 0.9 % SODIUM CHLORIDE 0.9 %
1000 INTRAVENOUS SOLUTION INTRAVENOUS ONCE
Status: COMPLETED | OUTPATIENT
Start: 2025-02-26 | End: 2025-02-26

## 2025-02-26 RX ADMIN — SODIUM CHLORIDE 1000 ML: 9 INJECTION, SOLUTION INTRAVENOUS at 12:13

## 2025-02-26 ASSESSMENT — PAIN SCALES - GENERAL: PAINLEVEL_OUTOF10: 10

## 2025-02-26 ASSESSMENT — HEART SCORE: ECG: NORMAL

## 2025-02-26 ASSESSMENT — PAIN - FUNCTIONAL ASSESSMENT: PAIN_FUNCTIONAL_ASSESSMENT: 0-10

## 2025-02-26 NOTE — ED PROVIDER NOTES
EMERGENCY DEPARTMENT ENCOUNTER    Pt Name: Shilo Alcantara  MRN: [id number]  Birthdate 1960  Date of evaluation: 25  CHIEF COMPLAINT       Chief Complaint   Patient presents with    Chest Pain     Was at  home attempting to do planning when pt felt hot, dizzy, and started to have left sided chest pain.      HISTORY OF PRESENT ILLNESS   HPI  64-year-old male with a history of CAD s/p CABG, anxiety, hypertension presents to the ED for evaluation after an episode of flushing, nausea, lightheadedness earlier today.  Patient states that he was at the  home making arrangements for his own eventual death, while there he suddenly started feeling hot, lightheaded without room spinning had some mild left-sided chest pain which she sometimes has.  Though symptoms have improved since then, he is no longer having chest pain but still feels drained and dehydrated.  He states that he had a mild cough lately.  He denies shortness of breath, abdominal pain, diarrhea, dysuria/hematuria, vision changes, focal weakness/numbness, headache or any other acute complaints.    REVIEW OF SYSTEMS     Review of Systems   All other systems reviewed and are negative.    PASTMEDICAL HISTORY     Past Medical History:   Diagnosis Date    Anxiety     Atherosclerosis of autologous vein coronary artery bypass graft 3/29/2007    CAD (coronary artery disease)     Chronic back pain     Congenital heart disease     Depression     Headache(784.0)     Herpes zoster keratoconjunctivitis 2017    History of colon polyps 10/15/2019    tubular adenoma x1    Hx of heart artery stent     pt states he has heart stents x 10    Hyperlipidemia 2014    Hypertension     LONG TERM ANTICOAGULENT USE     MI (myocardial infarction) ([facility])     Opioid withdrawal ([facility]) 2018    Osteoarthritis     Radicular syndrome of left leg 11/15/2016    Restless legs syndrome     S/P CABG x 4     Shingles outbreak 02/10/2017    in left eye      SURGICAL

## 2025-02-26 NOTE — ED NOTES
Pt presenting to the ED with complaints of chest pain as well as dizziness. Pt reports that this happened today when pt was at a  home and pt noticed this pain,.

## 2025-02-26 NOTE — DISCHARGE INSTRUCTIONS
Keep yourself well-hydrated, take all of your medications as prescribed.  If you have any concerning symptoms come back to the ER.  Follow-up with your heart doctor and primary care doctor.

## 2025-03-27 ENCOUNTER — PREP FOR PROCEDURE (OUTPATIENT)
Dept: GASTROENTEROLOGY | Age: 65
End: 2025-03-27

## 2025-03-27 ENCOUNTER — OFFICE VISIT (OUTPATIENT)
Dept: GASTROENTEROLOGY | Age: 65
End: 2025-03-27
Payer: MEDICAID

## 2025-03-27 VITALS
DIASTOLIC BLOOD PRESSURE: 70 MMHG | TEMPERATURE: 97.2 F | WEIGHT: 132.8 LBS | BODY MASS INDEX: 22.1 KG/M2 | SYSTOLIC BLOOD PRESSURE: 122 MMHG | HEART RATE: 69 BPM

## 2025-03-27 DIAGNOSIS — N28.89 RENAL MASS: ICD-10-CM

## 2025-03-27 DIAGNOSIS — R10.84 ABDOMINAL PAIN, GENERALIZED: ICD-10-CM

## 2025-03-27 DIAGNOSIS — D12.6 TUBULAR ADENOMA OF COLON: Primary | ICD-10-CM

## 2025-03-27 DIAGNOSIS — K58.9 IRRITABLE BOWEL SYNDROME, UNSPECIFIED TYPE: ICD-10-CM

## 2025-03-27 DIAGNOSIS — K58.9 IRRITABLE BOWEL SYNDROME, UNSPECIFIED: ICD-10-CM

## 2025-03-27 DIAGNOSIS — F12.90 MARIJUANA USE: ICD-10-CM

## 2025-03-27 PROCEDURE — 3074F SYST BP LT 130 MM HG: CPT | Performed by: INTERNAL MEDICINE

## 2025-03-27 PROCEDURE — 3078F DIAST BP <80 MM HG: CPT | Performed by: INTERNAL MEDICINE

## 2025-03-27 PROCEDURE — 1159F MED LIST DOCD IN RCRD: CPT | Performed by: INTERNAL MEDICINE

## 2025-03-27 PROCEDURE — 1123F ACP DISCUSS/DSCN MKR DOCD: CPT | Performed by: INTERNAL MEDICINE

## 2025-03-27 PROCEDURE — 99204 OFFICE O/P NEW MOD 45 MIN: CPT | Performed by: INTERNAL MEDICINE

## 2025-03-27 ASSESSMENT — ENCOUNTER SYMPTOMS
VOMITING: 0
SORE THROAT: 0
COUGH: 0
COLOR CHANGE: 0
CHOKING: 0
WHEEZING: 0
ABDOMINAL PAIN: 1
RECTAL PAIN: 0
CONSTIPATION: 1
DIARRHEA: 0
BLOOD IN STOOL: 0
SHORTNESS OF BREATH: 0
ABDOMINAL DISTENTION: 0
TROUBLE SWALLOWING: 0
NAUSEA: 0
ANAL BLEEDING: 0

## 2025-03-27 NOTE — PROGRESS NOTES
GI CLINIC FOLLOW UP    NTERVAL HISTORY:   No referring provider defined for this encounter.    Chief Complaint   Patient presents with    Abdominal Pain     Patient is here today due to LUQ abdominal pain, previous Dr Evangelista pt last seen on 9/18/19 for polyp of descending colon.       1. Tubular adenoma of colon    2. Renal mass    3. Abdominal pain, generalized    4. Irritable bowel syndrome, unspecified type    5. Marijuana use      This patient seen my office for the first time however he has been evaluated by my  who is retired now    He had a colonoscopy done in 2019 with removal of tubular adenoma    He is due for his colonoscopy    Recently has been diagnosed with renal cell cancer currently following with urologist    Some mild weight loss    Has irritable bowel syndrome like symptoms    Patient has been complaining of some abdominal pains, off and on cramping  Also complains of abdominal bloating and gas  Has off and on nausea without any sig vomiting  Has some alternating constipation and diarrhea  Has some weight loss  Has some anxiety issues      Not the healthiest eater he admits    History for marijuana use    Denies any significant GERD dysphagia nausea vomiting hematemesis    Records were reviewed with him        HISTORY OF PRESENT ILLNESS: Mr.Richard Alcantara is a 64 y.o. male with a past history remarkable for , referred for evaluation of   Chief Complaint   Patient presents with    Abdominal Pain     Patient is here today due to LUQ abdominal pain, previous Dr Evangelista pt last seen on 9/18/19 for polyp of descending colon.   .    Past Medical,Family, and Social History reviewed and does contribute to the patient presenting condition.    Patient's PMH/PSH,SH,PSYCH Hx, MEDs, ALLERGIES, and ROS were all reviewed and updated in the appropriate sections.    PAST MEDICAL HISTORY:  Past Medical History:   Diagnosis Date    Anxiety     Atherosclerosis of autologous vein coronary

## 2025-03-27 NOTE — TELEPHONE ENCOUNTER
Procedure scheduled/Dr ROXANNE Espinosa  Procedure: colon   Dx:   1. Tubular adenoma of colon    2. Renal mass    3. Abdominal pain, generalized    4. Irritable bowel syndrome, unspecified type    5. Marijuana use      Date: 8/18/25   Time: 11 a.m.  Hospital: [facility]   Bowel Prep instructions given: Miralax dulco  In office/via phone: office   Clearance needed: yes   Cardiology Dr Christy for Plavix   GLP - 1: N/A

## 2025-03-28 RX ORDER — POLYETHYLENE GLYCOL 3350 17 G/17G
POWDER, FOR SOLUTION ORAL
Qty: 238 G | Refills: 0 | Status: SHIPPED | OUTPATIENT
Start: 2025-03-28

## 2025-03-28 RX ORDER — BISACODYL 5 MG
TABLET, DELAYED RELEASE (ENTERIC COATED) ORAL
Qty: 4 TABLET | Refills: 0 | Status: SHIPPED | OUTPATIENT
Start: 2025-03-28

## (undated) DEVICE — SUTURE MCRYL SZ 4 0 L18IN ABSRB UD P 3 3 8 CIR REV CUT NDL MCP494G

## (undated) DEVICE — APPLICATOR MEDICATED 10.5 CC SOLUTION HI LT ORNG CHLORAPREP

## (undated) DEVICE — APPLICATOR MEDICATED 26 CC SOLUTION HI LT ORNG CHLORAPREP

## (undated) DEVICE — LIQUIBAND RAPID ADHESIVE 36/CS 0.8ML: Brand: MEDLINE

## (undated) DEVICE — GLOVE ORANGE PI 7   MSG9070

## (undated) DEVICE — GLOVE SURG SZ 8 L12IN THK75MIL DK GRN LTX FREE

## (undated) DEVICE — GLOVE SURG SZ 65 THK91MIL LTX FREE SYN POLYISOPRENE

## (undated) DEVICE — JELLY,LUBE,STERILE,FLIP TOP,TUBE,2-OZ: Brand: MEDLINE

## (undated) DEVICE — DRAPE,U/ SHT,SPLIT,PLAS,STERIL: Brand: MEDLINE

## (undated) DEVICE — POLYP TRAP: Brand: TRAPEASE®

## (undated) DEVICE — GOWN,POLY REINFORCED,LG: Brand: MEDLINE

## (undated) DEVICE — CLOTH PRE OP W9XL10.5IN 2% CHG FRAGRANCE RNS FREE READYPREP

## (undated) DEVICE — DEFENDO AIR WATER SUCTION AND BIOPSY VALVE KIT FOR  OLYMPUS: Brand: DEFENDO AIR/WATER/SUCTION AND BIOPSY VALVE

## (undated) DEVICE — GLOVE ORANGE PI 8   MSG9080

## (undated) DEVICE — SOLUTION IRRIG 1000ML 0.9% SOD CHL USP POUR PLAS BTL

## (undated) DEVICE — DISPOSABLE TOURNIQUET CUFF SINGLE BLADDER, SINGLE PORT AND QUICK CONNECT CONNECTOR: Brand: COLOR CUFF

## (undated) DEVICE — SNARE ENDOSCP L240CM LOOP W13MM DIA2.4MM SHT THROW SM OVL

## (undated) DEVICE — MHPB HAND AND FOOT PACK: Brand: MEDLINE INDUSTRIES, INC.

## (undated) DEVICE — SYRINGE MED 50ML LUERSLIP TIP